# Patient Record
Sex: MALE | Race: OTHER | Employment: OTHER | URBAN - METROPOLITAN AREA
[De-identification: names, ages, dates, MRNs, and addresses within clinical notes are randomized per-mention and may not be internally consistent; named-entity substitution may affect disease eponyms.]

---

## 2024-08-19 DIAGNOSIS — G89.29 OTHER CHRONIC PAIN: ICD-10-CM

## 2024-08-19 DIAGNOSIS — K21.9 GASTRO-ESOPHAGEAL REFLUX DISEASE WITHOUT ESOPHAGITIS: ICD-10-CM

## 2024-08-19 DIAGNOSIS — I10 PRIMARY HYPERTENSION: Primary | ICD-10-CM

## 2024-08-20 RX ORDER — TELMISARTAN AND HYDROCHLORTHIAZIDE 80; 12.5 MG/1; MG/1
1 TABLET ORAL DAILY
Qty: 90 TABLET | Refills: 0 | Status: SHIPPED | OUTPATIENT
Start: 2024-08-20

## 2024-08-20 RX ORDER — PANTOPRAZOLE SODIUM 20 MG/1
20 TABLET, DELAYED RELEASE ORAL DAILY
Qty: 90 TABLET | Refills: 0 | Status: SHIPPED | OUTPATIENT
Start: 2024-08-20

## 2024-08-20 RX ORDER — KETOROLAC TROMETHAMINE 10 MG/1
10 TABLET, FILM COATED ORAL EVERY 6 HOURS PRN
Qty: 60 TABLET | Refills: 0 | Status: SHIPPED | OUTPATIENT
Start: 2024-08-20

## 2024-09-03 RX ORDER — DICLOFENAC SODIUM 75 MG/1
75 TABLET, DELAYED RELEASE ORAL 2 TIMES DAILY
Qty: 180 TABLET | Refills: 0 | Status: SHIPPED | OUTPATIENT
Start: 2024-09-03

## 2024-09-16 ENCOUNTER — OFFICE VISIT (OUTPATIENT)
Age: 64
End: 2024-09-16
Payer: MEDICARE

## 2024-09-16 VITALS
OXYGEN SATURATION: 96 % | BODY MASS INDEX: 26.69 KG/M2 | HEIGHT: 69 IN | TEMPERATURE: 98.4 F | SYSTOLIC BLOOD PRESSURE: 147 MMHG | RESPIRATION RATE: 16 BRPM | WEIGHT: 180.2 LBS | DIASTOLIC BLOOD PRESSURE: 79 MMHG | HEART RATE: 93 BPM

## 2024-09-16 DIAGNOSIS — R51.9 ACUTE NONINTRACTABLE HEADACHE, UNSPECIFIED HEADACHE TYPE: ICD-10-CM

## 2024-09-16 DIAGNOSIS — R53.83 OTHER FATIGUE: ICD-10-CM

## 2024-09-16 DIAGNOSIS — B95.0 STREPTOCOCCAL INFECTION GROUP A: Primary | ICD-10-CM

## 2024-09-16 DIAGNOSIS — R59.0 CERVICAL LYMPHADENOPATHY: ICD-10-CM

## 2024-09-16 DIAGNOSIS — L30.9 ECZEMA, UNSPECIFIED TYPE: ICD-10-CM

## 2024-09-16 LAB
GROUP A STREP ANTIGEN, POC: POSITIVE
INFLUENZA A ANTIGEN, POC: NEGATIVE
INFLUENZA B ANTIGEN, POC: NEGATIVE
LOT EXPIRE DATE: NORMAL
LOT KIT NUMBER: NORMAL
SARS-COV-2 RNA, POC: NEGATIVE
VALID INTERNAL CONTROL, POC: YES
VALID INTERNAL CONTROL: YES
VENDOR AND KIT NAME POC: NORMAL

## 2024-09-16 PROCEDURE — G8427 DOCREV CUR MEDS BY ELIG CLIN: HCPCS | Performed by: NURSE PRACTITIONER

## 2024-09-16 PROCEDURE — PBSHW AMB POC RAPID STREP A: Performed by: NURSE PRACTITIONER

## 2024-09-16 PROCEDURE — 1036F TOBACCO NON-USER: CPT | Performed by: NURSE PRACTITIONER

## 2024-09-16 PROCEDURE — 99214 OFFICE O/P EST MOD 30 MIN: CPT | Performed by: NURSE PRACTITIONER

## 2024-09-16 PROCEDURE — 3017F COLORECTAL CA SCREEN DOC REV: CPT | Performed by: NURSE PRACTITIONER

## 2024-09-16 PROCEDURE — 87880 STREP A ASSAY W/OPTIC: CPT | Performed by: NURSE PRACTITIONER

## 2024-09-16 PROCEDURE — G8419 CALC BMI OUT NRM PARAM NOF/U: HCPCS | Performed by: NURSE PRACTITIONER

## 2024-09-16 PROCEDURE — PBSHW AMB POC SARS-COV-2 AND INFLUENZA A/B: Performed by: NURSE PRACTITIONER

## 2024-09-16 PROCEDURE — 87428 SARSCOV & INF VIR A&B AG IA: CPT | Performed by: NURSE PRACTITIONER

## 2024-09-16 RX ORDER — BETAMETHASONE DIPROPIONATE 0.05 %
OINTMENT (GRAM) TOPICAL
Qty: 45 G | Refills: 1 | Status: SHIPPED | OUTPATIENT
Start: 2024-09-16

## 2024-09-16 ASSESSMENT — ENCOUNTER SYMPTOMS
NAUSEA: 0
STRIDOR: 0
CONSTIPATION: 0
RHINORRHEA: 0
WHEEZING: 0
CHEST TIGHTNESS: 0
COUGH: 0
EYES NEGATIVE: 1
DIARRHEA: 0
SINUS PRESSURE: 0
ABDOMINAL PAIN: 0
SINUS PAIN: 0
GASTROINTESTINAL NEGATIVE: 1
EYE REDNESS: 0
VOMITING: 0
EYE PAIN: 0
SHORTNESS OF BREATH: 0
BLOOD IN STOOL: 0
BACK PAIN: 0

## 2024-09-16 ASSESSMENT — PATIENT HEALTH QUESTIONNAIRE - PHQ9
SUM OF ALL RESPONSES TO PHQ QUESTIONS 1-9: 0
2. FEELING DOWN, DEPRESSED OR HOPELESS: NOT AT ALL
1. LITTLE INTEREST OR PLEASURE IN DOING THINGS: NOT AT ALL
SUM OF ALL RESPONSES TO PHQ QUESTIONS 1-9: 0
SUM OF ALL RESPONSES TO PHQ9 QUESTIONS 1 & 2: 0
SUM OF ALL RESPONSES TO PHQ QUESTIONS 1-9: 0
SUM OF ALL RESPONSES TO PHQ QUESTIONS 1-9: 0

## 2024-09-17 LAB
ALBUMIN SERPL-MCNC: 3.6 G/DL (ref 3.5–5)
ALBUMIN/GLOB SERPL: 0.7 (ref 1.1–2.2)
ALP SERPL-CCNC: 145 U/L (ref 45–117)
ALT SERPL-CCNC: 22 U/L (ref 12–78)
ANION GAP SERPL CALC-SCNC: 5 MMOL/L (ref 2–12)
AST SERPL-CCNC: 15 U/L (ref 15–37)
BASOPHILS # BLD: 0 K/UL (ref 0–0.1)
BASOPHILS NFR BLD: 0 % (ref 0–1)
BILIRUB SERPL-MCNC: 1 MG/DL (ref 0.2–1)
BUN SERPL-MCNC: 17 MG/DL (ref 6–20)
BUN/CREAT SERPL: 15 (ref 12–20)
CALCIUM SERPL-MCNC: 8.6 MG/DL (ref 8.5–10.1)
CHLORIDE SERPL-SCNC: 101 MMOL/L (ref 97–108)
CO2 SERPL-SCNC: 29 MMOL/L (ref 21–32)
CREAT SERPL-MCNC: 1.14 MG/DL (ref 0.7–1.3)
DIFFERENTIAL METHOD BLD: ABNORMAL
EOSINOPHIL # BLD: 0.5 K/UL (ref 0–0.4)
EOSINOPHIL NFR BLD: 7 % (ref 0–7)
ERYTHROCYTE [DISTWIDTH] IN BLOOD BY AUTOMATED COUNT: 14.7 % (ref 11.5–14.5)
GLOBULIN SER CALC-MCNC: 5.2 G/DL (ref 2–4)
GLUCOSE SERPL-MCNC: 135 MG/DL (ref 65–100)
HCT VFR BLD AUTO: 41.7 % (ref 36.6–50.3)
HGB BLD-MCNC: 14.1 G/DL (ref 12.1–17)
IMM GRANULOCYTES # BLD AUTO: 0 K/UL
IMM GRANULOCYTES NFR BLD AUTO: 0 %
LYMPHOCYTES # BLD: 0.3 K/UL (ref 0.8–3.5)
LYMPHOCYTES NFR BLD: 4 % (ref 12–49)
MCH RBC QN AUTO: 30.3 PG (ref 26–34)
MCHC RBC AUTO-ENTMCNC: 33.8 G/DL (ref 30–36.5)
MCV RBC AUTO: 89.7 FL (ref 80–99)
MONOCYTES # BLD: 0.6 K/UL (ref 0–1)
MONOCYTES NFR BLD: 8 % (ref 5–13)
NEUTS BAND NFR BLD MANUAL: 2 % (ref 0–6)
NEUTS SEG # BLD: 6.2 K/UL (ref 1.8–8)
NEUTS SEG NFR BLD: 79 % (ref 32–75)
NRBC # BLD: 0 K/UL (ref 0–0.01)
NRBC BLD-RTO: 0 PER 100 WBC
PLATELET # BLD AUTO: 166 K/UL (ref 150–400)
PMV BLD AUTO: 11.1 FL (ref 8.9–12.9)
POTASSIUM SERPL-SCNC: 3.3 MMOL/L (ref 3.5–5.1)
PROT SERPL-MCNC: 8.8 G/DL (ref 6.4–8.2)
RBC # BLD AUTO: 4.65 M/UL (ref 4.1–5.7)
RBC MORPH BLD: ABNORMAL
SODIUM SERPL-SCNC: 135 MMOL/L (ref 136–145)
TSH SERPL DL<=0.05 MIU/L-ACNC: 1.23 UIU/ML (ref 0.36–3.74)
WBC # BLD AUTO: 7.6 K/UL (ref 4.1–11.1)

## 2024-09-23 ENCOUNTER — OFFICE VISIT (OUTPATIENT)
Age: 64
End: 2024-09-23
Payer: MEDICARE

## 2024-09-23 VITALS
SYSTOLIC BLOOD PRESSURE: 154 MMHG | RESPIRATION RATE: 16 BRPM | WEIGHT: 186.8 LBS | OXYGEN SATURATION: 96 % | BODY MASS INDEX: 27.67 KG/M2 | HEART RATE: 72 BPM | TEMPERATURE: 97.5 F | HEIGHT: 69 IN | DIASTOLIC BLOOD PRESSURE: 74 MMHG

## 2024-09-23 DIAGNOSIS — T78.40XD ALLERGIC REACTION, SUBSEQUENT ENCOUNTER: Primary | ICD-10-CM

## 2024-09-23 DIAGNOSIS — I10 PRIMARY HYPERTENSION: ICD-10-CM

## 2024-09-23 DIAGNOSIS — B95.0 STREPTOCOCCAL INFECTION GROUP A: ICD-10-CM

## 2024-09-23 PROCEDURE — G8419 CALC BMI OUT NRM PARAM NOF/U: HCPCS | Performed by: NURSE PRACTITIONER

## 2024-09-23 PROCEDURE — 1036F TOBACCO NON-USER: CPT | Performed by: NURSE PRACTITIONER

## 2024-09-23 PROCEDURE — 99213 OFFICE O/P EST LOW 20 MIN: CPT | Performed by: NURSE PRACTITIONER

## 2024-09-23 PROCEDURE — G8427 DOCREV CUR MEDS BY ELIG CLIN: HCPCS | Performed by: NURSE PRACTITIONER

## 2024-09-23 PROCEDURE — 3077F SYST BP >= 140 MM HG: CPT | Performed by: NURSE PRACTITIONER

## 2024-09-23 PROCEDURE — 3017F COLORECTAL CA SCREEN DOC REV: CPT | Performed by: NURSE PRACTITIONER

## 2024-09-23 PROCEDURE — 3078F DIAST BP <80 MM HG: CPT | Performed by: NURSE PRACTITIONER

## 2024-09-23 RX ORDER — CLINDAMYCIN HCL 150 MG
450 CAPSULE ORAL 3 TIMES DAILY
COMMUNITY
Start: 2024-09-17

## 2024-09-23 RX ORDER — HYDROXYZINE HYDROCHLORIDE 50 MG/1
50 TABLET, FILM COATED ORAL NIGHTLY
Qty: 30 TABLET | Refills: 0 | Status: SHIPPED | OUTPATIENT
Start: 2024-09-23 | End: 2024-10-23

## 2024-09-23 RX ORDER — FAMOTIDINE 40 MG/1
40 TABLET, FILM COATED ORAL DAILY
COMMUNITY
Start: 2024-09-17 | End: 2024-09-23 | Stop reason: ALTCHOICE

## 2024-09-23 SDOH — ECONOMIC STABILITY: FOOD INSECURITY: WITHIN THE PAST 12 MONTHS, YOU WORRIED THAT YOUR FOOD WOULD RUN OUT BEFORE YOU GOT MONEY TO BUY MORE.: NEVER TRUE

## 2024-09-23 SDOH — ECONOMIC STABILITY: INCOME INSECURITY: HOW HARD IS IT FOR YOU TO PAY FOR THE VERY BASICS LIKE FOOD, HOUSING, MEDICAL CARE, AND HEATING?: NOT HARD AT ALL

## 2024-09-23 SDOH — ECONOMIC STABILITY: FOOD INSECURITY: WITHIN THE PAST 12 MONTHS, THE FOOD YOU BOUGHT JUST DIDN'T LAST AND YOU DIDN'T HAVE MONEY TO GET MORE.: NEVER TRUE

## 2024-09-23 ASSESSMENT — ENCOUNTER SYMPTOMS
VOMITING: 0
RHINORRHEA: 0
EYE REDNESS: 0
EYES NEGATIVE: 1
COUGH: 0
NAUSEA: 0
CONSTIPATION: 0
DIARRHEA: 0
EYE PAIN: 0
GASTROINTESTINAL NEGATIVE: 1
SINUS PAIN: 0
ABDOMINAL PAIN: 0
COLOR CHANGE: 1
BLOOD IN STOOL: 0
RESPIRATORY NEGATIVE: 1
CHEST TIGHTNESS: 0
SHORTNESS OF BREATH: 0
BACK PAIN: 0
SINUS PRESSURE: 0

## 2024-10-01 ENCOUNTER — TELEPHONE (OUTPATIENT)
Age: 64
End: 2024-10-01

## 2024-10-01 NOTE — TELEPHONE ENCOUNTER
Patient in [facility] currently and had stopped antibiotic a few days ago because of tingling and had started antibiotic last because he could not sleep. Patient woke this am with severe headache, swollen lymph nodes and dizziness.  Spouse calling to inform he was taken by ambulance and we will receive notes. Spouse ended call before paramedic  evaluation.

## 2024-10-03 ENCOUNTER — TELEPHONE (OUTPATIENT)
Age: 64
End: 2024-10-03

## 2024-10-03 NOTE — TELEPHONE ENCOUNTER
Patient's wife was sent through to the office via the nurse triage line.  She states that they are currently visiting family in [place] and patient went to the ER there due to swelling.  She states that the hospital advised that his kidney function is decreased and he should see a nephrologist.  Patient does not return to the [place] until 10/9 and has an appointment scheduled with Dr. Barajas on 10/10.  Provided contact number to [facility] for patient's wife to try to get an appointment scheduled with their office.

## 2024-10-03 NOTE — TELEPHONE ENCOUNTER
Patient wife called stating that the nephrology office needs the last 3 office notes faxed to them before scheduling an appointment. Patients wife is requesting that the hospital in [facility] fax all labs and visit summary.  Patient is willing to do a VV appointment if possible to discuss all of his concerns. Patient would like recommendations on what he should and should not be doing before his in person appointment on 10/10/2024.     [facility]   [phone]

## 2024-10-08 NOTE — PROGRESS NOTES
Zoltan Fontana Jr (:  1960) is a 64 y.o. male,Established patient, here for evaluation of the following chief complaint(s):  No chief complaint on file.     Diagnosis Orders   1. Primary hypertension        2. Rash        3. Renal insufficiency             Assessment & Plan  1. 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 Records are not great and I am putting this together based on story ..  The patient had a severe allergic reaction to amoxicillin, which led to systemic symptoms including swelling, redness, and potential kidney issues. This reaction may have caused first and second-degree burn-like symptoms and significant inflammation. The rash has mostly cleared up,but the swelling, shortness of breath and tingling remains.  Due to his shortness of breath and chest tightness, he was advised to seek immediate medical attention at the emergency room.    2. Chronic Kidney Disease.  His kidney function was normal as of 2024. The abnormality in his kidney function is likely due to his recent health issues, including a strep infection and an allergic reaction, both of which can cause glomerulonephritis?  or other kidney inflammation. These conditions can lead to systemic reactions, causing significant inflammation and potentially affecting the kidneys and blood pressure, leading to swelling. A referral to a nephrologist was made for further evaluation but given is acute swelling, chest pain and sob we sent him to ER    3. Shortness of Breath.  He reports feeling like his chest is in a vice, with significant tightness and shortness of breath, especially when lying down. He feels as a vice is wrapping around him . Given these symptoms, he was advised to go to the emergency room for immediate evaluation and treatment.    4. Tingling Sensation.  He continues to experience tingling in his tongue, fingertips, and feet, which started after taking antibiotics at the emergency room. This may be

## 2024-10-10 ENCOUNTER — APPOINTMENT (OUTPATIENT)
Facility: HOSPITAL | Age: 64
DRG: 698 | End: 2024-10-10
Payer: MEDICARE

## 2024-10-10 ENCOUNTER — OFFICE VISIT (OUTPATIENT)
Age: 64
End: 2024-10-10
Payer: MEDICARE

## 2024-10-10 ENCOUNTER — HOSPITAL ENCOUNTER (INPATIENT)
Facility: HOSPITAL | Age: 64
LOS: 10 days | Discharge: HOME | DRG: 698 | End: 2024-10-20
Attending: EMERGENCY MEDICINE | Admitting: HOSPITALIST
Payer: MEDICARE

## 2024-10-10 VITALS
HEART RATE: 89 BPM | RESPIRATION RATE: 16 BRPM | HEIGHT: 69 IN | BODY MASS INDEX: 29.51 KG/M2 | TEMPERATURE: 98.2 F | WEIGHT: 199.2 LBS | DIASTOLIC BLOOD PRESSURE: 85 MMHG | SYSTOLIC BLOOD PRESSURE: 110 MMHG | OXYGEN SATURATION: 97 %

## 2024-10-10 DIAGNOSIS — J18.9 COMMUNITY ACQUIRED PNEUMONIA, UNSPECIFIED LATERALITY: ICD-10-CM

## 2024-10-10 DIAGNOSIS — N17.9 ACUTE RENAL FAILURE, UNSPECIFIED ACUTE RENAL FAILURE TYPE (HCC): Primary | ICD-10-CM

## 2024-10-10 DIAGNOSIS — I10 PRIMARY HYPERTENSION: Primary | ICD-10-CM

## 2024-10-10 DIAGNOSIS — N28.9 RENAL INSUFFICIENCY: ICD-10-CM

## 2024-10-10 DIAGNOSIS — R21 RASH: ICD-10-CM

## 2024-10-10 LAB
ALBUMIN SERPL-MCNC: 0.8 G/DL (ref 3.5–5)
ALBUMIN/GLOB SERPL: 0.1 (ref 1.1–2.2)
ALP SERPL-CCNC: 184 U/L (ref 45–117)
ALT SERPL-CCNC: 26 U/L (ref 12–78)
AMORPH CRY URNS QL MICRO: ABNORMAL
ANION GAP SERPL CALC-SCNC: 5 MMOL/L (ref 2–12)
APPEARANCE UR: CLEAR
AST SERPL-CCNC: 37 U/L (ref 15–37)
BACTERIA URNS QL MICRO: ABNORMAL /HPF
BASOPHILS # BLD: 0.1 K/UL (ref 0–0.1)
BASOPHILS NFR BLD: 1 % (ref 0–1)
BILIRUB SERPL-MCNC: 0.4 MG/DL (ref 0.2–1)
BILIRUB UR QL: NEGATIVE
BUN SERPL-MCNC: 38 MG/DL (ref 6–20)
BUN/CREAT SERPL: 12 (ref 12–20)
CALCIUM SERPL-MCNC: 7.1 MG/DL (ref 8.5–10.1)
CHLORIDE SERPL-SCNC: 115 MMOL/L (ref 97–108)
CO2 SERPL-SCNC: 19 MMOL/L (ref 21–32)
COLOR UR: ABNORMAL
COMMENT:: NORMAL
COMMENT:: NORMAL
CREAT SERPL-MCNC: 3.05 MG/DL (ref 0.7–1.3)
DIFFERENTIAL METHOD BLD: ABNORMAL
EOSINOPHIL # BLD: 0.8 K/UL (ref 0–0.4)
EOSINOPHIL NFR BLD: 10 % (ref 0–7)
EPITH CASTS URNS QL MICRO: ABNORMAL /LPF
ERYTHROCYTE [DISTWIDTH] IN BLOOD BY AUTOMATED COUNT: 15.7 % (ref 11.5–14.5)
FLUAV RNA SPEC QL NAA+PROBE: NOT DETECTED
FLUBV RNA SPEC QL NAA+PROBE: NOT DETECTED
GLOBULIN SER CALC-MCNC: 6.7 G/DL (ref 2–4)
GLUCOSE SERPL-MCNC: 112 MG/DL (ref 65–100)
GLUCOSE UR STRIP.AUTO-MCNC: 500 MG/DL
HCT VFR BLD AUTO: 41.2 % (ref 36.6–50.3)
HGB BLD-MCNC: 13.8 G/DL (ref 12.1–17)
HGB UR QL STRIP: ABNORMAL
IMM GRANULOCYTES # BLD AUTO: 0 K/UL
IMM GRANULOCYTES NFR BLD AUTO: 0 %
KETONES UR QL STRIP.AUTO: ABNORMAL MG/DL
LEUKOCYTE ESTERASE UR QL STRIP.AUTO: NEGATIVE
LIPASE SERPL-CCNC: 26 U/L (ref 13–75)
LYMPHOCYTES # BLD: 0.6 K/UL (ref 0.8–3.5)
LYMPHOCYTES NFR BLD: 8 % (ref 12–49)
MCH RBC QN AUTO: 30.5 PG (ref 26–34)
MCHC RBC AUTO-ENTMCNC: 33.5 G/DL (ref 30–36.5)
MCV RBC AUTO: 90.9 FL (ref 80–99)
MONOCYTES # BLD: 0.3 K/UL (ref 0–1)
MONOCYTES NFR BLD: 4 % (ref 5–13)
MUCOUS THREADS URNS QL MICRO: ABNORMAL /LPF
NEUTS BAND NFR BLD MANUAL: 1 % (ref 0–6)
NEUTS SEG # BLD: 6.2 K/UL (ref 1.8–8)
NEUTS SEG NFR BLD: 76 % (ref 32–75)
NITRITE UR QL STRIP.AUTO: NEGATIVE
NRBC # BLD: 0 K/UL (ref 0–0.01)
NRBC BLD-RTO: 0 PER 100 WBC
PH UR STRIP: 7 (ref 5–8)
PLATELET # BLD AUTO: 143 K/UL (ref 150–400)
PMV BLD AUTO: 12 FL (ref 8.9–12.9)
POTASSIUM SERPL-SCNC: 4.7 MMOL/L (ref 3.5–5.1)
PROT SERPL-MCNC: 7.5 G/DL (ref 6.4–8.2)
PROT UR STRIP-MCNC: >300 MG/DL
RBC # BLD AUTO: 4.53 M/UL (ref 4.1–5.7)
RBC #/AREA URNS HPF: ABNORMAL /HPF (ref 0–5)
RBC MORPH BLD: ABNORMAL
SARS-COV-2 RNA RESP QL NAA+PROBE: NOT DETECTED
SODIUM SERPL-SCNC: 139 MMOL/L (ref 136–145)
SOURCE: NORMAL
SP GR UR REFRACTOMETRY: 1.02 (ref 1–1.03)
SPECIMEN HOLD: NORMAL
SPECIMEN HOLD: NORMAL
TROPONIN I SERPL HS-MCNC: 31 NG/L (ref 0–76)
UROBILINOGEN UR QL STRIP.AUTO: 0.2 EU/DL (ref 0.2–1)
WBC # BLD AUTO: 8 K/UL (ref 4.1–11.1)
WBC MORPH BLD: ABNORMAL
WBC URNS QL MICRO: ABNORMAL /HPF (ref 0–4)

## 2024-10-10 PROCEDURE — 1036F TOBACCO NON-USER: CPT | Performed by: INTERNAL MEDICINE

## 2024-10-10 PROCEDURE — 36415 COLL VENOUS BLD VENIPUNCTURE: CPT

## 2024-10-10 PROCEDURE — 2580000003 HC RX 258: Performed by: HOSPITALIST

## 2024-10-10 PROCEDURE — G8419 CALC BMI OUT NRM PARAM NOF/U: HCPCS | Performed by: INTERNAL MEDICINE

## 2024-10-10 PROCEDURE — 99215 OFFICE O/P EST HI 40 MIN: CPT | Performed by: INTERNAL MEDICINE

## 2024-10-10 PROCEDURE — 3074F SYST BP LT 130 MM HG: CPT | Performed by: INTERNAL MEDICINE

## 2024-10-10 PROCEDURE — 76770 US EXAM ABDO BACK WALL COMP: CPT

## 2024-10-10 PROCEDURE — 81001 URINALYSIS AUTO W/SCOPE: CPT

## 2024-10-10 PROCEDURE — 96375 TX/PRO/DX INJ NEW DRUG ADDON: CPT

## 2024-10-10 PROCEDURE — 6360000002 HC RX W HCPCS: Performed by: HOSPITALIST

## 2024-10-10 PROCEDURE — 6370000000 HC RX 637 (ALT 250 FOR IP): Performed by: STUDENT IN AN ORGANIZED HEALTH CARE EDUCATION/TRAINING PROGRAM

## 2024-10-10 PROCEDURE — 84484 ASSAY OF TROPONIN QUANT: CPT

## 2024-10-10 PROCEDURE — 71046 X-RAY EXAM CHEST 2 VIEWS: CPT

## 2024-10-10 PROCEDURE — 87636 SARSCOV2 & INF A&B AMP PRB: CPT

## 2024-10-10 PROCEDURE — 2580000003 HC RX 258: Performed by: EMERGENCY MEDICINE

## 2024-10-10 PROCEDURE — 80053 COMPREHEN METABOLIC PANEL: CPT

## 2024-10-10 PROCEDURE — G8484 FLU IMMUNIZE NO ADMIN: HCPCS | Performed by: INTERNAL MEDICINE

## 2024-10-10 PROCEDURE — 6360000002 HC RX W HCPCS: Performed by: EMERGENCY MEDICINE

## 2024-10-10 PROCEDURE — 85025 COMPLETE CBC W/AUTO DIFF WBC: CPT

## 2024-10-10 PROCEDURE — 83690 ASSAY OF LIPASE: CPT

## 2024-10-10 PROCEDURE — 3079F DIAST BP 80-89 MM HG: CPT | Performed by: INTERNAL MEDICINE

## 2024-10-10 PROCEDURE — 6370000000 HC RX 637 (ALT 250 FOR IP): Performed by: EMERGENCY MEDICINE

## 2024-10-10 PROCEDURE — 96365 THER/PROPH/DIAG IV INF INIT: CPT

## 2024-10-10 PROCEDURE — 2060000000 HC ICU INTERMEDIATE R&B

## 2024-10-10 PROCEDURE — G8428 CUR MEDS NOT DOCUMENT: HCPCS | Performed by: INTERNAL MEDICINE

## 2024-10-10 PROCEDURE — 3017F COLORECTAL CA SCREEN DOC REV: CPT | Performed by: INTERNAL MEDICINE

## 2024-10-10 PROCEDURE — 99285 EMERGENCY DEPT VISIT HI MDM: CPT

## 2024-10-10 PROCEDURE — 93005 ELECTROCARDIOGRAM TRACING: CPT | Performed by: EMERGENCY MEDICINE

## 2024-10-10 RX ORDER — 0.9 % SODIUM CHLORIDE 0.9 %
1000 INTRAVENOUS SOLUTION INTRAVENOUS ONCE
Status: COMPLETED | OUTPATIENT
Start: 2024-10-10 | End: 2024-10-10

## 2024-10-10 RX ORDER — ACETAMINOPHEN 500 MG
1000 TABLET ORAL
Status: COMPLETED | OUTPATIENT
Start: 2024-10-10 | End: 2024-10-10

## 2024-10-10 RX ORDER — TIZANIDINE 2 MG/1
4 TABLET ORAL EVERY 8 HOURS PRN
Status: DISCONTINUED | OUTPATIENT
Start: 2024-10-10 | End: 2024-10-20 | Stop reason: HOSPADM

## 2024-10-10 RX ORDER — DIAZEPAM 5 MG/1
5 TABLET ORAL ONCE
Status: COMPLETED | OUTPATIENT
Start: 2024-10-10 | End: 2024-10-10

## 2024-10-10 RX ORDER — IBUPROFEN 200 MG
400 TABLET ORAL
Status: DISCONTINUED | OUTPATIENT
Start: 2024-10-11 | End: 2024-10-10

## 2024-10-10 RX ADMIN — TIZANIDINE 4 MG: 2 TABLET ORAL at 21:35

## 2024-10-10 RX ADMIN — DIAZEPAM 5 MG: 5 TABLET ORAL at 15:54

## 2024-10-10 RX ADMIN — NICARDIPINE HYDROCHLORIDE 5 MG/HR: 2.5 INJECTION, SOLUTION INTRAVENOUS at 20:26

## 2024-10-10 RX ADMIN — AZITHROMYCIN MONOHYDRATE 500 MG: 500 INJECTION, POWDER, LYOPHILIZED, FOR SOLUTION INTRAVENOUS at 17:04

## 2024-10-10 RX ADMIN — SODIUM CHLORIDE 1000 ML: 9 INJECTION, SOLUTION INTRAVENOUS at 16:32

## 2024-10-10 RX ADMIN — ACETAMINOPHEN 1000 MG: 500 TABLET ORAL at 15:54

## 2024-10-10 RX ADMIN — SODIUM CHLORIDE 1000 ML: 9 INJECTION, SOLUTION INTRAVENOUS at 17:10

## 2024-10-10 RX ADMIN — WATER 2000 MG: 1 INJECTION INTRAMUSCULAR; INTRAVENOUS; SUBCUTANEOUS at 17:04

## 2024-10-10 ASSESSMENT — PAIN SCALES - GENERAL
PAINLEVEL_OUTOF10: 5
PAINLEVEL_OUTOF10: 8
PAINLEVEL_OUTOF10: 7
PAINLEVEL_OUTOF10: 7

## 2024-10-10 ASSESSMENT — PAIN DESCRIPTION - ORIENTATION: ORIENTATION: LOWER

## 2024-10-10 ASSESSMENT — PAIN DESCRIPTION - LOCATION
LOCATION: BACK
LOCATION: BACK

## 2024-10-10 ASSESSMENT — PAIN - FUNCTIONAL ASSESSMENT: PAIN_FUNCTIONAL_ASSESSMENT: 0-10

## 2024-10-10 ASSESSMENT — PAIN DESCRIPTION - FREQUENCY: FREQUENCY: INTERMITTENT

## 2024-10-10 NOTE — ED PROVIDER NOTES
pulse oximetry; ordering and review of studies; arranging urgent treatment with development of a management plan; evaluation of patient's response to treatment; frequent reassessment; and, discussions with other providers.  This critical care time was performed to assess and manage the high probability of imminent, life-threatening deterioration that could result in multi-organ failure. It was exclusive of separately billable procedures and treating other patients and teaching time.      FINAL IMPRESSION      1. Acute renal failure, unspecified acute renal failure type (HCC)    2. Community acquired pneumonia, unspecified laterality          DISPOSITION/PLAN   DISPOSITION Admitted 10/10/2024 08:30:24 PM      PATIENT REFERRED TO:  No follow-up provider specified.    DISCHARGE MEDICATIONS:  Current Discharge Medication List            Altaf Carter MD (electronically signed)  Emergency Attending Physician       Perfect Serve Consult for Admission  12:34 PM    ED Room Number: B336/01  Patient Name and age:  Zoltan Fontana Jr 64 y.o.  male  Working Diagnosis:   1. Acute renal failure, unspecified acute renal failure type (HCC)    2. Community acquired pneumonia, unspecified laterality        Department: [facility] - [phone]  Recommended Level of Care: telemetry  Readmission: No  Code Status:  Full Code  Sepsis present:  No  Reassessment needed: Yes  Isolation Requirements: no  COVID-19 Suspicion: No    Other:       Altaf Carter MD  10/11/24 6750

## 2024-10-10 NOTE — ED TRIAGE NOTES
Pt arrives to the ER for complaints of upper abdominal pain and shortness of breath that started about two weeks ago. Pt also reports chest pain along with chest tightness. Reports that the pain is constant.     Pt reports he also had swelling to his arms and legs.     Pt reports that he just got back from [place]. Pt states that he was seen at a hospital there and was told that he had elevated kidney function.     Reports that he has foaming urine and itching all over body.     Reports that kidney disease runs in his family

## 2024-10-11 LAB
COMMENT:: NORMAL
CREAT UR-MCNC: 152 MG/DL
CREAT UR-MCNC: 156 MG/DL
EKG ATRIAL RATE: 86 BPM
EKG DIAGNOSIS: NORMAL
EKG P AXIS: 57 DEGREES
EKG P-R INTERVAL: 126 MS
EKG Q-T INTERVAL: 388 MS
EKG QRS DURATION: 84 MS
EKG QTC CALCULATION (BAZETT): 464 MS
EKG R AXIS: 47 DEGREES
EKG T AXIS: -9 DEGREES
EKG VENTRICULAR RATE: 86 BPM
HBV SURFACE AB SER QL: REACTIVE
HBV SURFACE AB SER-ACNC: >1000 MIU/ML
HBV SURFACE AG SER QL: <0.1 INDEX
HBV SURFACE AG SER QL: NEGATIVE
HCV AB SER IA-ACNC: 0.22 INDEX
HCV AB SERPL QL IA: NONREACTIVE
MICROALBUMIN UR-MCNC: 853 MG/DL
MICROALBUMIN/CREAT UR-RTO: 5468 MG/G (ref 0–30)
PROT UR-MCNC: 2074 MG/DL (ref 0–11.9)
PROT/CREAT UR-RTO: 13.6
SPECIMEN HOLD: NORMAL

## 2024-10-11 PROCEDURE — 2580000003 HC RX 258: Performed by: HOSPITALIST

## 2024-10-11 PROCEDURE — 94761 N-INVAS EAR/PLS OXIMETRY MLT: CPT

## 2024-10-11 PROCEDURE — 2060000000 HC ICU INTERMEDIATE R&B

## 2024-10-11 PROCEDURE — 86038 ANTINUCLEAR ANTIBODIES: CPT

## 2024-10-11 PROCEDURE — 6360000002 HC RX W HCPCS: Performed by: HOSPITALIST

## 2024-10-11 PROCEDURE — 82043 UR ALBUMIN QUANTITATIVE: CPT

## 2024-10-11 PROCEDURE — 86160 COMPLEMENT ANTIGEN: CPT

## 2024-10-11 PROCEDURE — 83521 IG LIGHT CHAINS FREE EACH: CPT

## 2024-10-11 PROCEDURE — 82570 ASSAY OF URINE CREATININE: CPT

## 2024-10-11 PROCEDURE — 86037 ANCA TITER EACH ANTIBODY: CPT

## 2024-10-11 PROCEDURE — 86060 ANTISTREPTOLYSIN O TITER: CPT

## 2024-10-11 PROCEDURE — 6360000002 HC RX W HCPCS: Performed by: INTERNAL MEDICINE

## 2024-10-11 PROCEDURE — 6360000002 HC RX W HCPCS: Performed by: STUDENT IN AN ORGANIZED HEALTH CARE EDUCATION/TRAINING PROGRAM

## 2024-10-11 PROCEDURE — 84156 ASSAY OF PROTEIN URINE: CPT

## 2024-10-11 PROCEDURE — 2580000003 HC RX 258: Performed by: STUDENT IN AN ORGANIZED HEALTH CARE EDUCATION/TRAINING PROGRAM

## 2024-10-11 PROCEDURE — 83516 IMMUNOASSAY NONANTIBODY: CPT

## 2024-10-11 PROCEDURE — 84165 PROTEIN E-PHORESIS SERUM: CPT

## 2024-10-11 PROCEDURE — 6370000000 HC RX 637 (ALT 250 FOR IP): Performed by: STUDENT IN AN ORGANIZED HEALTH CARE EDUCATION/TRAINING PROGRAM

## 2024-10-11 PROCEDURE — 82784 ASSAY IGA/IGD/IGG/IGM EACH: CPT

## 2024-10-11 PROCEDURE — 87340 HEPATITIS B SURFACE AG IA: CPT

## 2024-10-11 PROCEDURE — 86334 IMMUNOFIX E-PHORESIS SERUM: CPT

## 2024-10-11 PROCEDURE — 86706 HEP B SURFACE ANTIBODY: CPT

## 2024-10-11 PROCEDURE — 51798 US URINE CAPACITY MEASURE: CPT

## 2024-10-11 PROCEDURE — 6360000002 HC RX W HCPCS

## 2024-10-11 PROCEDURE — 93010 ELECTROCARDIOGRAM REPORT: CPT | Performed by: STUDENT IN AN ORGANIZED HEALTH CARE EDUCATION/TRAINING PROGRAM

## 2024-10-11 PROCEDURE — 86803 HEPATITIS C AB TEST: CPT

## 2024-10-11 PROCEDURE — 84155 ASSAY OF PROTEIN SERUM: CPT

## 2024-10-11 RX ORDER — PROCHLORPERAZINE EDISYLATE 5 MG/ML
5 INJECTION INTRAMUSCULAR; INTRAVENOUS EVERY 6 HOURS PRN
Status: DISCONTINUED | OUTPATIENT
Start: 2024-10-11 | End: 2024-10-20 | Stop reason: HOSPADM

## 2024-10-11 RX ORDER — BUMETANIDE 0.25 MG/ML
2 INJECTION, SOLUTION INTRAMUSCULAR; INTRAVENOUS 2 TIMES DAILY
Status: DISCONTINUED | OUTPATIENT
Start: 2024-10-11 | End: 2024-10-20 | Stop reason: HOSPADM

## 2024-10-11 RX ORDER — NIFEDIPINE 30 MG/1
30 TABLET, EXTENDED RELEASE ORAL DAILY
Status: DISCONTINUED | OUTPATIENT
Start: 2024-10-11 | End: 2024-10-12

## 2024-10-11 RX ORDER — KETOROLAC TROMETHAMINE 30 MG/ML
15 INJECTION, SOLUTION INTRAMUSCULAR; INTRAVENOUS ONCE
Status: COMPLETED | OUTPATIENT
Start: 2024-10-11 | End: 2024-10-11

## 2024-10-11 RX ORDER — LOSARTAN POTASSIUM 50 MG/1
100 TABLET ORAL DAILY
Status: DISCONTINUED | OUTPATIENT
Start: 2024-10-11 | End: 2024-10-20 | Stop reason: HOSPADM

## 2024-10-11 RX ORDER — HEPARIN SODIUM 5000 [USP'U]/ML
5000 INJECTION, SOLUTION INTRAVENOUS; SUBCUTANEOUS EVERY 8 HOURS SCHEDULED
Status: DISCONTINUED | OUTPATIENT
Start: 2024-10-11 | End: 2024-10-18

## 2024-10-11 RX ORDER — MORPHINE SULFATE 2 MG/ML
2 INJECTION, SOLUTION INTRAMUSCULAR; INTRAVENOUS EVERY 4 HOURS PRN
Status: DISPENSED | OUTPATIENT
Start: 2024-10-11 | End: 2024-10-13

## 2024-10-11 RX ADMIN — TIZANIDINE 4 MG: 2 TABLET ORAL at 08:56

## 2024-10-11 RX ADMIN — BUMETANIDE 2 MG: 0.25 INJECTION INTRAMUSCULAR; INTRAVENOUS at 13:07

## 2024-10-11 RX ADMIN — NIFEDIPINE 30 MG: 30 TABLET, FILM COATED, EXTENDED RELEASE ORAL at 14:38

## 2024-10-11 RX ADMIN — NICARDIPINE HYDROCHLORIDE 5 MG/HR: 2.5 INJECTION, SOLUTION INTRAVENOUS at 07:49

## 2024-10-11 RX ADMIN — NICARDIPINE HYDROCHLORIDE 5 MG/HR: 2.5 INJECTION, SOLUTION INTRAVENOUS at 13:28

## 2024-10-11 RX ADMIN — LOSARTAN POTASSIUM 100 MG: 50 TABLET, FILM COATED ORAL at 14:24

## 2024-10-11 RX ADMIN — HEPARIN SODIUM 5000 UNITS: 5000 INJECTION INTRAVENOUS; SUBCUTANEOUS at 21:17

## 2024-10-11 RX ADMIN — PANTOPRAZOLE SODIUM 40 MG: 40 INJECTION, POWDER, FOR SOLUTION INTRAVENOUS at 13:10

## 2024-10-11 RX ADMIN — CEFTRIAXONE SODIUM 2000 MG: 2 INJECTION, POWDER, FOR SOLUTION INTRAMUSCULAR; INTRAVENOUS at 18:26

## 2024-10-11 RX ADMIN — BUMETANIDE 2 MG: 0.25 INJECTION INTRAMUSCULAR; INTRAVENOUS at 18:25

## 2024-10-11 RX ADMIN — MORPHINE SULFATE 2 MG: 2 INJECTION, SOLUTION INTRAMUSCULAR; INTRAVENOUS at 14:22

## 2024-10-11 RX ADMIN — KETOROLAC TROMETHAMINE 15 MG: 30 INJECTION, SOLUTION INTRAMUSCULAR at 01:52

## 2024-10-11 RX ADMIN — AZITHROMYCIN MONOHYDRATE 500 MG: 500 INJECTION, POWDER, LYOPHILIZED, FOR SOLUTION INTRAVENOUS at 18:49

## 2024-10-11 RX ADMIN — NICARDIPINE HYDROCHLORIDE 5 MG/HR: 2.5 INJECTION, SOLUTION INTRAVENOUS at 01:05

## 2024-10-11 RX ADMIN — PROCHLORPERAZINE EDISYLATE 5 MG: 5 INJECTION INTRAMUSCULAR; INTRAVENOUS at 19:34

## 2024-10-11 RX ADMIN — HEPARIN SODIUM 5000 UNITS: 5000 INJECTION INTRAVENOUS; SUBCUTANEOUS at 14:39

## 2024-10-11 RX ADMIN — MORPHINE SULFATE 2 MG: 2 INJECTION, SOLUTION INTRAMUSCULAR; INTRAVENOUS at 20:21

## 2024-10-11 RX ADMIN — NICARDIPINE HYDROCHLORIDE 5 MG/HR: 2.5 INJECTION, SOLUTION INTRAVENOUS at 13:04

## 2024-10-11 ASSESSMENT — PAIN DESCRIPTION - ORIENTATION
ORIENTATION: LEFT;RIGHT
ORIENTATION: RIGHT;LEFT
ORIENTATION: RIGHT;LEFT

## 2024-10-11 ASSESSMENT — PAIN SCALES - GENERAL
PAINLEVEL_OUTOF10: 8
PAINLEVEL_OUTOF10: 2
PAINLEVEL_OUTOF10: 5
PAINLEVEL_OUTOF10: 4
PAINLEVEL_OUTOF10: 0
PAINLEVEL_OUTOF10: 6
PAINLEVEL_OUTOF10: 0
PAINLEVEL_OUTOF10: 9

## 2024-10-11 ASSESSMENT — PAIN DESCRIPTION - DESCRIPTORS
DESCRIPTORS: SHARP
DESCRIPTORS: SPASM

## 2024-10-11 ASSESSMENT — PAIN DESCRIPTION - LOCATION
LOCATION: BACK;NECK
LOCATION: FLANK
LOCATION: ABDOMEN;FLANK
LOCATION: FLANK
LOCATION: BACK
LOCATION: HEAD

## 2024-10-11 ASSESSMENT — PAIN - FUNCTIONAL ASSESSMENT: PAIN_FUNCTIONAL_ASSESSMENT: PREVENTS OR INTERFERES SOME ACTIVE ACTIVITIES AND ADLS

## 2024-10-11 ASSESSMENT — PAIN DESCRIPTION - PAIN TYPE: TYPE: ACUTE PAIN

## 2024-10-11 ASSESSMENT — PAIN SCALES - WONG BAKER: WONGBAKER_NUMERICALRESPONSE: NO HURT

## 2024-10-11 NOTE — CONSULTS
medical history on file.     Past Surgical Hx:     Past Surgical History:   Procedure Laterality Date    ANTERIOR CRUCIATE LIGAMENT REPAIR      BACK SURGERY  2010    rods and screws in the back, fusion. 4 surgeries total       Medications:  Prior to Admission medications    Medication Sig Start Date End Date Taking? Authorizing Provider   diclofenac (VOLTAREN) 75 MG EC tablet Take 1 tablet by mouth 2 times daily 9/3/24  Yes Toya Carlisle MD   pantoprazole (PROTONIX) 20 MG tablet Take 1 tablet by mouth daily 8/20/24  Yes Toya Carlisle MD   telmisartan-hydroCHLOROthiazide (MICARDIS HCT) 80-12.5 MG per tablet Take 1 tablet by mouth daily 8/20/24  Yes Toya Carlisle MD   ketorolac (TORADOL) 10 MG tablet Take 1 tablet by mouth every 6 hours as needed for Pain 8/20/24  Yes Toya Carlisle MD   tiZANidine (ZANAFLEX) 4 MG tablet Take 1 tablet by mouth every 8 hours as needed (muscle spasms) 9/10/22  Yes ProviderBrooke MD   clindamycin (CLEOCIN) 150 MG capsule Take 3 capsules by mouth 3 times daily  Patient not taking: Reported on 10/10/2024 9/17/24   Brooke Baumann MD   hydrOXYzine HCl (ATARAX) 50 MG tablet Take 1 tablet by mouth nightly  Patient not taking: Reported on 10/10/2024 9/23/24 10/23/24  Emilia Vera APRN - CNP   betamethasone dipropionate 0.05 % ointment Apply topically daily.  Patient not taking: Reported on 10/10/2024 9/16/24   Emilia Vera APRN - CNP       Allergies   Allergen Reactions    Augmentin [Amoxicillin-Pot Clavulanate] Anaphylaxis    Bee Venom Palpitations and Shortness Of Breath    Rosuvastatin Other (See Comments)     Insomnia        Social Hx:  reports that he has never smoked. He has never used smokeless tobacco. He reports current alcohol use. He reports that he does not use drugs.     Family History   Problem Relation Age of Onset    Hypertension Paternal Grandfather     Heart Disease Paternal Grandfather     Heart Disease Paternal

## 2024-10-11 NOTE — ED NOTES
TRANSFER - OUT REPORT:    Verbal report given to Sivan on Zoltan Fontana Jr  being transferred to [facility] for routine progression of patient care       Report consisted of patient's Situation, Background, Assessment and   Recommendations(SBAR).     Information from the following report(s) Nurse Handoff Report, Index, ED Encounter Summary, ED SBAR, Intake/Output, MAR, Recent Results, Quality Measures, Neuro Assessment, and Event Log was reviewed with the receiving nurse.    [place] Fall Assessment:    Presents to emergency department  because of falls (Syncope, seizure, or loss of consciousness): No  Age > 70: No  Altered Mental Status, Intoxication with alcohol or substance confusion (Disorientation, impaired judgment, poor safety awaremess, or inability to follow instructions): No  Impaired Mobility: Ambulates or transfers with assistive devices or assistance; Unable to ambulate or transer.: No  Nursing Judgement: No          Lines:   Peripheral IV 10/10/24 Distal;Left;Anterior Forearm (Active)       Peripheral IV 10/10/24 Right Antecubital (Active)        Opportunity for questions and clarification was provided.      Patient transported with:  Registered Nurse

## 2024-10-11 NOTE — CARE COORDINATION
4:20 PM  10/11/24    Care Management Progress Note    Reason for Admission:   ARF (acute renal failure) (HCC) [N17.9]  Acute renal failure, unspecified acute renal failure type (HCC) [N17.9]  Community acquired pneumonia, unspecified laterality [J18.9]         Patient Admission Status: Inpatient  Date Admitted to INP:  11%  RUR: Readmission Risk Score: 10.6    Hospitalization in the last 30 days (Readmission):    No      Transition of care plan:  Ongoing medical management- pt discussed during IDR; renal biopsy planned for Monday  Anticipated discharge plan: Home with family assistance as needed; CM will continue to follow pt's clinical course and assist with discharge needs.  Outpatient follow-up.  Discharge transport: ADRIAN Colón

## 2024-10-12 ENCOUNTER — APPOINTMENT (OUTPATIENT)
Facility: HOSPITAL | Age: 64
DRG: 698 | End: 2024-10-12
Payer: MEDICARE

## 2024-10-12 LAB
ALBUMIN SERPL-MCNC: 0.8 G/DL (ref 3.5–5)
ALBUMIN/GLOB SERPL: 0.1 (ref 1.1–2.2)
ALP SERPL-CCNC: 151 U/L (ref 45–117)
ALT SERPL-CCNC: 17 U/L (ref 12–78)
ANION GAP SERPL CALC-SCNC: 5 MMOL/L (ref 2–12)
AST SERPL-CCNC: 14 U/L (ref 15–37)
BASOPHILS # BLD: 0.3 K/UL (ref 0–0.1)
BASOPHILS NFR BLD: 4 % (ref 0–1)
BILIRUB SERPL-MCNC: 0.3 MG/DL (ref 0.2–1)
BUN SERPL-MCNC: 34 MG/DL (ref 6–20)
BUN/CREAT SERPL: 13 (ref 12–20)
CALCIUM SERPL-MCNC: 7.1 MG/DL (ref 8.5–10.1)
CHLORIDE SERPL-SCNC: 117 MMOL/L (ref 97–108)
CHOLEST SERPL-MCNC: 207 MG/DL
CO2 SERPL-SCNC: 18 MMOL/L (ref 21–32)
CREAT SERPL-MCNC: 2.72 MG/DL (ref 0.7–1.3)
DIFFERENTIAL METHOD BLD: ABNORMAL
EOSINOPHIL # BLD: 1.2 K/UL (ref 0–0.4)
EOSINOPHIL NFR BLD: 16 % (ref 0–7)
ERYTHROCYTE [DISTWIDTH] IN BLOOD BY AUTOMATED COUNT: 15.9 % (ref 11.5–14.5)
EST. AVERAGE GLUCOSE BLD GHB EST-MCNC: 100 MG/DL
GLOBULIN SER CALC-MCNC: 6.4 G/DL (ref 2–4)
GLUCOSE SERPL-MCNC: 90 MG/DL (ref 65–100)
HBA1C MFR BLD: 5.1 % (ref 4–5.6)
HCT VFR BLD AUTO: 37.5 % (ref 36.6–50.3)
HDLC SERPL-MCNC: 22 MG/DL
HDLC SERPL: 9.4 (ref 0–5)
HGB BLD-MCNC: 12.5 G/DL (ref 12.1–17)
IMM GRANULOCYTES # BLD AUTO: 0 K/UL
IMM GRANULOCYTES NFR BLD AUTO: 0 %
LDLC SERPL CALC-MCNC: 125 MG/DL (ref 0–100)
LYMPHOCYTES # BLD: 1.2 K/UL (ref 0.8–3.5)
LYMPHOCYTES NFR BLD: 16 % (ref 12–49)
MCH RBC QN AUTO: 30.3 PG (ref 26–34)
MCHC RBC AUTO-ENTMCNC: 33.3 G/DL (ref 30–36.5)
MCV RBC AUTO: 91 FL (ref 80–99)
MONOCYTES # BLD: 0.2 K/UL (ref 0–1)
MONOCYTES NFR BLD: 2 % (ref 5–13)
NEUTS BAND NFR BLD MANUAL: 1 % (ref 0–6)
NEUTS SEG # BLD: 4.7 K/UL (ref 1.8–8)
NEUTS SEG NFR BLD: 61 % (ref 32–75)
NRBC # BLD: 0 K/UL (ref 0–0.01)
NRBC BLD-RTO: 0 PER 100 WBC
PLATELET # BLD AUTO: 151 K/UL (ref 150–400)
POTASSIUM SERPL-SCNC: 3.7 MMOL/L (ref 3.5–5.1)
PROCALCITONIN SERPL-MCNC: 0.47 NG/ML
PROT SERPL-MCNC: 7.2 G/DL (ref 6.4–8.2)
RBC # BLD AUTO: 4.12 M/UL (ref 4.1–5.7)
RBC MORPH BLD: ABNORMAL
SODIUM SERPL-SCNC: 140 MMOL/L (ref 136–145)
TRIGL SERPL-MCNC: 300 MG/DL
VLDLC SERPL CALC-MCNC: 60 MG/DL
WBC # BLD AUTO: 7.6 K/UL (ref 4.1–11.1)

## 2024-10-12 PROCEDURE — 36415 COLL VENOUS BLD VENIPUNCTURE: CPT

## 2024-10-12 PROCEDURE — P9047 ALBUMIN (HUMAN), 25%, 50ML: HCPCS | Performed by: STUDENT IN AN ORGANIZED HEALTH CARE EDUCATION/TRAINING PROGRAM

## 2024-10-12 PROCEDURE — 2700000000 HC OXYGEN THERAPY PER DAY

## 2024-10-12 PROCEDURE — 6360000002 HC RX W HCPCS: Performed by: STUDENT IN AN ORGANIZED HEALTH CARE EDUCATION/TRAINING PROGRAM

## 2024-10-12 PROCEDURE — 76705 ECHO EXAM OF ABDOMEN: CPT

## 2024-10-12 PROCEDURE — 2060000000 HC ICU INTERMEDIATE R&B

## 2024-10-12 PROCEDURE — 6370000000 HC RX 637 (ALT 250 FOR IP)

## 2024-10-12 PROCEDURE — 6360000002 HC RX W HCPCS: Performed by: HOSPITALIST

## 2024-10-12 PROCEDURE — 74176 CT ABD & PELVIS W/O CONTRAST: CPT

## 2024-10-12 PROCEDURE — 80053 COMPREHEN METABOLIC PANEL: CPT

## 2024-10-12 PROCEDURE — 2580000003 HC RX 258: Performed by: STUDENT IN AN ORGANIZED HEALTH CARE EDUCATION/TRAINING PROGRAM

## 2024-10-12 PROCEDURE — 6360000002 HC RX W HCPCS: Performed by: INTERNAL MEDICINE

## 2024-10-12 PROCEDURE — 80061 LIPID PANEL: CPT

## 2024-10-12 PROCEDURE — 6370000000 HC RX 637 (ALT 250 FOR IP): Performed by: STUDENT IN AN ORGANIZED HEALTH CARE EDUCATION/TRAINING PROGRAM

## 2024-10-12 PROCEDURE — 2580000003 HC RX 258: Performed by: HOSPITALIST

## 2024-10-12 PROCEDURE — 84145 PROCALCITONIN (PCT): CPT

## 2024-10-12 PROCEDURE — 83036 HEMOGLOBIN GLYCOSYLATED A1C: CPT

## 2024-10-12 PROCEDURE — 85025 COMPLETE CBC W/AUTO DIFF WBC: CPT

## 2024-10-12 RX ORDER — NIFEDIPINE 30 MG/1
60 TABLET, EXTENDED RELEASE ORAL DAILY
Status: DISCONTINUED | OUTPATIENT
Start: 2024-10-12 | End: 2024-10-14

## 2024-10-12 RX ORDER — HYDROXYZINE HYDROCHLORIDE 25 MG/1
25 TABLET, FILM COATED ORAL ONCE
Status: COMPLETED | OUTPATIENT
Start: 2024-10-12 | End: 2024-10-12

## 2024-10-12 RX ORDER — HYDRALAZINE HYDROCHLORIDE 20 MG/ML
10 INJECTION INTRAMUSCULAR; INTRAVENOUS EVERY 4 HOURS PRN
Status: DISCONTINUED | OUTPATIENT
Start: 2024-10-12 | End: 2024-10-20 | Stop reason: HOSPADM

## 2024-10-12 RX ORDER — ALBUMIN (HUMAN) 12.5 G/50ML
25 SOLUTION INTRAVENOUS ONCE
Status: COMPLETED | OUTPATIENT
Start: 2024-10-12 | End: 2024-10-12

## 2024-10-12 RX ORDER — NIFEDIPINE 30 MG/1
30 TABLET, EXTENDED RELEASE ORAL ONCE
Status: COMPLETED | OUTPATIENT
Start: 2024-10-12 | End: 2024-10-12

## 2024-10-12 RX ORDER — HYDROXYZINE HYDROCHLORIDE 10 MG/1
10 TABLET, FILM COATED ORAL ONCE
Status: COMPLETED | OUTPATIENT
Start: 2024-10-12 | End: 2024-10-12

## 2024-10-12 RX ORDER — LABETALOL HYDROCHLORIDE 5 MG/ML
10 INJECTION, SOLUTION INTRAVENOUS EVERY 6 HOURS PRN
Status: DISCONTINUED | OUTPATIENT
Start: 2024-10-12 | End: 2024-10-20 | Stop reason: HOSPADM

## 2024-10-12 RX ORDER — BUTALBITAL, ACETAMINOPHEN AND CAFFEINE 50; 325; 40 MG/1; MG/1; MG/1
1 TABLET ORAL ONCE
Status: COMPLETED | OUTPATIENT
Start: 2024-10-12 | End: 2024-10-12

## 2024-10-12 RX ADMIN — MORPHINE SULFATE 2 MG: 2 INJECTION, SOLUTION INTRAMUSCULAR; INTRAVENOUS at 11:14

## 2024-10-12 RX ADMIN — ALBUMIN (HUMAN) 25 G: 0.25 INJECTION, SOLUTION INTRAVENOUS at 12:44

## 2024-10-12 RX ADMIN — MORPHINE SULFATE 2 MG: 2 INJECTION, SOLUTION INTRAMUSCULAR; INTRAVENOUS at 20:17

## 2024-10-12 RX ADMIN — MORPHINE SULFATE 2 MG: 2 INJECTION, SOLUTION INTRAMUSCULAR; INTRAVENOUS at 16:26

## 2024-10-12 RX ADMIN — HYDROXYZINE HYDROCHLORIDE 10 MG: 10 TABLET ORAL at 20:53

## 2024-10-12 RX ADMIN — BUTALBITAL, ACETAMINOPHEN, AND CAFFEINE 1 TABLET: 325; 50; 40 TABLET ORAL at 20:53

## 2024-10-12 RX ADMIN — HEPARIN SODIUM 5000 UNITS: 5000 INJECTION INTRAVENOUS; SUBCUTANEOUS at 15:41

## 2024-10-12 RX ADMIN — BUMETANIDE 2 MG: 0.25 INJECTION INTRAMUSCULAR; INTRAVENOUS at 08:54

## 2024-10-12 RX ADMIN — NIFEDIPINE 30 MG: 30 TABLET, FILM COATED, EXTENDED RELEASE ORAL at 08:55

## 2024-10-12 RX ADMIN — HYDROXYZINE HYDROCHLORIDE 25 MG: 25 TABLET, FILM COATED ORAL at 06:06

## 2024-10-12 RX ADMIN — NICARDIPINE HYDROCHLORIDE 5 MG/HR: 2.5 INJECTION, SOLUTION INTRAVENOUS at 16:33

## 2024-10-12 RX ADMIN — NIFEDIPINE 30 MG: 30 TABLET, FILM COATED, EXTENDED RELEASE ORAL at 12:41

## 2024-10-12 RX ADMIN — BUMETANIDE 2 MG: 0.25 INJECTION INTRAMUSCULAR; INTRAVENOUS at 18:12

## 2024-10-12 RX ADMIN — HEPARIN SODIUM 5000 UNITS: 5000 INJECTION INTRAVENOUS; SUBCUTANEOUS at 20:54

## 2024-10-12 RX ADMIN — HYDRALAZINE HYDROCHLORIDE 10 MG: 20 INJECTION INTRAMUSCULAR; INTRAVENOUS at 15:49

## 2024-10-12 RX ADMIN — HEPARIN SODIUM 5000 UNITS: 5000 INJECTION INTRAVENOUS; SUBCUTANEOUS at 05:35

## 2024-10-12 RX ADMIN — LOSARTAN POTASSIUM 100 MG: 50 TABLET, FILM COATED ORAL at 08:54

## 2024-10-12 RX ADMIN — MORPHINE SULFATE 2 MG: 2 INJECTION, SOLUTION INTRAMUSCULAR; INTRAVENOUS at 01:38

## 2024-10-12 RX ADMIN — PANTOPRAZOLE SODIUM 40 MG: 40 INJECTION, POWDER, FOR SOLUTION INTRAVENOUS at 08:54

## 2024-10-12 ASSESSMENT — PAIN DESCRIPTION - ORIENTATION
ORIENTATION: RIGHT;LEFT;ANTERIOR;LOWER
ORIENTATION: LOWER

## 2024-10-12 ASSESSMENT — PAIN DESCRIPTION - LOCATION
LOCATION: BACK;ABDOMEN
LOCATION: ABDOMEN
LOCATION: HEAD
LOCATION: ABDOMEN;BACK

## 2024-10-12 ASSESSMENT — PAIN - FUNCTIONAL ASSESSMENT
PAIN_FUNCTIONAL_ASSESSMENT: ACTIVITIES ARE NOT PREVENTED
PAIN_FUNCTIONAL_ASSESSMENT: ACTIVITIES ARE NOT PREVENTED
PAIN_FUNCTIONAL_ASSESSMENT: PREVENTS OR INTERFERES SOME ACTIVE ACTIVITIES AND ADLS

## 2024-10-12 ASSESSMENT — PAIN SCALES - GENERAL
PAINLEVEL_OUTOF10: 0
PAINLEVEL_OUTOF10: 0
PAINLEVEL_OUTOF10: 6
PAINLEVEL_OUTOF10: 8
PAINLEVEL_OUTOF10: 7
PAINLEVEL_OUTOF10: 8
PAINLEVEL_OUTOF10: 0
PAINLEVEL_OUTOF10: 8
PAINLEVEL_OUTOF10: 8
PAINLEVEL_OUTOF10: 1
PAINLEVEL_OUTOF10: 1
PAINLEVEL_OUTOF10: 0

## 2024-10-12 ASSESSMENT — PAIN DESCRIPTION - DESCRIPTORS
DESCRIPTORS: SHARP
DESCRIPTORS: ACHING
DESCRIPTORS: ACHING;SHARP;STABBING
DESCRIPTORS: ACHING

## 2024-10-13 LAB
ALBUMIN SERPL-MCNC: 1.2 G/DL (ref 3.5–5)
ANION GAP SERPL CALC-SCNC: 4 MMOL/L (ref 2–12)
APPEARANCE UR: CLEAR
ASO AB SERPL-ACNC: 139 IU/ML (ref 0–200)
BACTERIA URNS QL MICRO: ABNORMAL /HPF
BASOPHILS # BLD: 0.1 K/UL (ref 0–0.1)
BASOPHILS NFR BLD: 1 % (ref 0–1)
BILIRUB UR QL: NEGATIVE
BUN SERPL-MCNC: 30 MG/DL (ref 6–20)
BUN/CREAT SERPL: 12 (ref 12–20)
C3 SERPL-MCNC: 127 MG/DL (ref 82–167)
C4 SERPL-MCNC: 12 MG/DL (ref 12–38)
CALCIUM SERPL-MCNC: 7.5 MG/DL (ref 8.5–10.1)
CHLORIDE SERPL-SCNC: 116 MMOL/L (ref 97–108)
CO2 SERPL-SCNC: 21 MMOL/L (ref 21–32)
COLOR UR: ABNORMAL
COMMENT:: NORMAL
COMMENT:: NORMAL
CREAT SERPL-MCNC: 2.43 MG/DL (ref 0.7–1.3)
DIFFERENTIAL METHOD BLD: ABNORMAL
EOSINOPHIL # BLD: 0.4 K/UL (ref 0–0.4)
EOSINOPHIL NFR BLD: 5 % (ref 0–7)
EPITH CASTS URNS QL MICRO: ABNORMAL /LPF
ERYTHROCYTE [DISTWIDTH] IN BLOOD BY AUTOMATED COUNT: 15.6 % (ref 11.5–14.5)
GLUCOSE SERPL-MCNC: 90 MG/DL (ref 65–100)
GLUCOSE UR STRIP.AUTO-MCNC: 500 MG/DL
HCT VFR BLD AUTO: 36.1 % (ref 36.6–50.3)
HGB BLD-MCNC: 12.2 G/DL (ref 12.1–17)
HGB UR QL STRIP: ABNORMAL
IMM GRANULOCYTES # BLD AUTO: 0 K/UL (ref 0–0.04)
IMM GRANULOCYTES NFR BLD AUTO: 0 % (ref 0–0.5)
KETONES UR QL STRIP.AUTO: NEGATIVE MG/DL
LEUKOCYTE ESTERASE UR QL STRIP.AUTO: NEGATIVE
LYMPHOCYTES # BLD: 1.3 K/UL (ref 0.8–3.5)
LYMPHOCYTES NFR BLD: 17 % (ref 12–49)
MCH RBC QN AUTO: 30.2 PG (ref 26–34)
MCHC RBC AUTO-ENTMCNC: 33.8 G/DL (ref 30–36.5)
MCV RBC AUTO: 89.4 FL (ref 80–99)
METAMYELOCYTES NFR BLD MANUAL: 4 %
MONOCYTES # BLD: 0.5 K/UL (ref 0–1)
MONOCYTES NFR BLD: 6 % (ref 5–13)
NEUTS BAND NFR BLD MANUAL: 2 %
NEUTS SEG # BLD: 5.2 K/UL (ref 1.8–8)
NEUTS SEG NFR BLD: 65 % (ref 32–75)
NITRITE UR QL STRIP.AUTO: NEGATIVE
NRBC # BLD: 0 K/UL (ref 0–0.01)
NRBC BLD-RTO: 0 PER 100 WBC
PH UR STRIP: 7.5 (ref 5–8)
PHOSPHATE SERPL-MCNC: 3.5 MG/DL (ref 2.6–4.7)
PLATELET # BLD AUTO: 180 K/UL (ref 150–400)
PMV BLD AUTO: 10.7 FL (ref 8.9–12.9)
POTASSIUM SERPL-SCNC: 3.6 MMOL/L (ref 3.5–5.1)
PROT UR STRIP-MCNC: >300 MG/DL
RBC # BLD AUTO: 4.04 M/UL (ref 4.1–5.7)
RBC #/AREA URNS HPF: ABNORMAL /HPF (ref 0–5)
RBC MORPH BLD: ABNORMAL
SODIUM SERPL-SCNC: 141 MMOL/L (ref 136–145)
SP GR UR REFRACTOMETRY: 1.01 (ref 1–1.03)
SPECIMEN HOLD: NORMAL
SPECIMEN HOLD: NORMAL
URINE CULTURE IF INDICATED: ABNORMAL
UROBILINOGEN UR QL STRIP.AUTO: 0.2 EU/DL (ref 0.2–1)
WBC # BLD AUTO: 7.7 K/UL (ref 4.1–11.1)
WBC URNS QL MICRO: ABNORMAL /HPF (ref 0–4)

## 2024-10-13 PROCEDURE — 6370000000 HC RX 637 (ALT 250 FOR IP): Performed by: STUDENT IN AN ORGANIZED HEALTH CARE EDUCATION/TRAINING PROGRAM

## 2024-10-13 PROCEDURE — 80069 RENAL FUNCTION PANEL: CPT

## 2024-10-13 PROCEDURE — 6360000002 HC RX W HCPCS: Performed by: STUDENT IN AN ORGANIZED HEALTH CARE EDUCATION/TRAINING PROGRAM

## 2024-10-13 PROCEDURE — 51798 US URINE CAPACITY MEASURE: CPT

## 2024-10-13 PROCEDURE — 94761 N-INVAS EAR/PLS OXIMETRY MLT: CPT

## 2024-10-13 PROCEDURE — 36415 COLL VENOUS BLD VENIPUNCTURE: CPT

## 2024-10-13 PROCEDURE — 94640 AIRWAY INHALATION TREATMENT: CPT

## 2024-10-13 PROCEDURE — 2580000003 HC RX 258: Performed by: STUDENT IN AN ORGANIZED HEALTH CARE EDUCATION/TRAINING PROGRAM

## 2024-10-13 PROCEDURE — 85025 COMPLETE CBC W/AUTO DIFF WBC: CPT

## 2024-10-13 PROCEDURE — 6360000002 HC RX W HCPCS: Performed by: INTERNAL MEDICINE

## 2024-10-13 PROCEDURE — 2060000000 HC ICU INTERMEDIATE R&B

## 2024-10-13 PROCEDURE — 81001 URINALYSIS AUTO W/SCOPE: CPT

## 2024-10-13 RX ORDER — POLYETHYLENE GLYCOL 3350 17 G/17G
17 POWDER, FOR SOLUTION ORAL DAILY
Status: DISCONTINUED | OUTPATIENT
Start: 2024-10-13 | End: 2024-10-20 | Stop reason: HOSPADM

## 2024-10-13 RX ORDER — HYDROMORPHONE HYDROCHLORIDE 1 MG/ML
0.25 INJECTION, SOLUTION INTRAMUSCULAR; INTRAVENOUS; SUBCUTANEOUS EVERY 4 HOURS PRN
Status: DISCONTINUED | OUTPATIENT
Start: 2024-10-13 | End: 2024-10-15

## 2024-10-13 RX ORDER — IPRATROPIUM BROMIDE AND ALBUTEROL SULFATE 2.5; .5 MG/3ML; MG/3ML
1 SOLUTION RESPIRATORY (INHALATION) EVERY 6 HOURS PRN
Status: DISCONTINUED | OUTPATIENT
Start: 2024-10-13 | End: 2024-10-20 | Stop reason: HOSPADM

## 2024-10-13 RX ORDER — SENNA AND DOCUSATE SODIUM 50; 8.6 MG/1; MG/1
2 TABLET, FILM COATED ORAL DAILY PRN
Status: DISCONTINUED | OUTPATIENT
Start: 2024-10-13 | End: 2024-10-15

## 2024-10-13 RX ADMIN — PANTOPRAZOLE SODIUM 40 MG: 40 INJECTION, POWDER, FOR SOLUTION INTRAVENOUS at 08:53

## 2024-10-13 RX ADMIN — POLYETHYLENE GLYCOL 3350 17 G: 17 POWDER, FOR SOLUTION ORAL at 15:06

## 2024-10-13 RX ADMIN — HEPARIN SODIUM 5000 UNITS: 5000 INJECTION INTRAVENOUS; SUBCUTANEOUS at 22:38

## 2024-10-13 RX ADMIN — BUMETANIDE 2 MG: 0.25 INJECTION INTRAMUSCULAR; INTRAVENOUS at 17:29

## 2024-10-13 RX ADMIN — HEPARIN SODIUM 5000 UNITS: 5000 INJECTION INTRAVENOUS; SUBCUTANEOUS at 06:36

## 2024-10-13 RX ADMIN — HYDROMORPHONE HYDROCHLORIDE 0.25 MG: 1 INJECTION, SOLUTION INTRAMUSCULAR; INTRAVENOUS; SUBCUTANEOUS at 22:38

## 2024-10-13 RX ADMIN — HEPARIN SODIUM 5000 UNITS: 5000 INJECTION INTRAVENOUS; SUBCUTANEOUS at 13:27

## 2024-10-13 RX ADMIN — BUMETANIDE 2 MG: 0.25 INJECTION INTRAMUSCULAR; INTRAVENOUS at 08:54

## 2024-10-13 RX ADMIN — TIZANIDINE 4 MG: 2 TABLET ORAL at 18:56

## 2024-10-13 RX ADMIN — NIFEDIPINE 60 MG: 30 TABLET, FILM COATED, EXTENDED RELEASE ORAL at 08:54

## 2024-10-13 RX ADMIN — IPRATROPIUM BROMIDE AND ALBUTEROL SULFATE 1 DOSE: 2.5; .5 SOLUTION RESPIRATORY (INHALATION) at 11:12

## 2024-10-13 RX ADMIN — MORPHINE SULFATE 2 MG: 2 INJECTION, SOLUTION INTRAMUSCULAR; INTRAVENOUS at 13:26

## 2024-10-13 RX ADMIN — LOSARTAN POTASSIUM 100 MG: 50 TABLET, FILM COATED ORAL at 08:54

## 2024-10-13 RX ADMIN — TIZANIDINE 4 MG: 2 TABLET ORAL at 12:17

## 2024-10-13 ASSESSMENT — PAIN DESCRIPTION - DESCRIPTORS
DESCRIPTORS: STABBING
DESCRIPTORS: ACHING

## 2024-10-13 ASSESSMENT — PAIN SCALES - GENERAL
PAINLEVEL_OUTOF10: 10
PAINLEVEL_OUTOF10: 8
PAINLEVEL_OUTOF10: 8
PAINLEVEL_OUTOF10: 0
PAINLEVEL_OUTOF10: 8
PAINLEVEL_OUTOF10: 7
PAINLEVEL_OUTOF10: 9
PAINLEVEL_OUTOF10: 0

## 2024-10-13 ASSESSMENT — PAIN DESCRIPTION - LOCATION
LOCATION: BACK
LOCATION: ABDOMEN;BACK
LOCATION: BACK

## 2024-10-13 ASSESSMENT — PAIN - FUNCTIONAL ASSESSMENT: PAIN_FUNCTIONAL_ASSESSMENT: ACTIVITIES ARE NOT PREVENTED

## 2024-10-13 ASSESSMENT — PAIN DESCRIPTION - ORIENTATION
ORIENTATION: LEFT;RIGHT;MID
ORIENTATION: LOWER

## 2024-10-14 ENCOUNTER — APPOINTMENT (OUTPATIENT)
Facility: HOSPITAL | Age: 64
DRG: 698 | End: 2024-10-14
Payer: MEDICARE

## 2024-10-14 LAB
ALBUMIN SERPL-MCNC: 1 G/DL (ref 3.5–5)
ANA SER QL: NEGATIVE
ANION GAP SERPL CALC-SCNC: 4 MMOL/L (ref 2–12)
BASOPHILS # BLD: 0.1 K/UL (ref 0–0.1)
BASOPHILS NFR BLD: 1 % (ref 0–1)
BUN SERPL-MCNC: 25 MG/DL (ref 6–20)
BUN/CREAT SERPL: 13 (ref 12–20)
CALCIUM SERPL-MCNC: 7.7 MG/DL (ref 8.5–10.1)
CHLORIDE SERPL-SCNC: 114 MMOL/L (ref 97–108)
CO2 SERPL-SCNC: 20 MMOL/L (ref 21–32)
CREAT SERPL-MCNC: 1.95 MG/DL (ref 0.7–1.3)
DIFFERENTIAL METHOD BLD: ABNORMAL
EOSINOPHIL # BLD: 0.6 K/UL (ref 0–0.4)
EOSINOPHIL NFR BLD: 8 % (ref 0–7)
ERYTHROCYTE [DISTWIDTH] IN BLOOD BY AUTOMATED COUNT: 15.5 % (ref 11.5–14.5)
GBM IGG SER-ACNC: 0.2 UNITS (ref 0–0.9)
GLUCOSE SERPL-MCNC: 87 MG/DL (ref 65–100)
HCT VFR BLD AUTO: 37.2 % (ref 36.6–50.3)
HGB BLD-MCNC: 12.6 G/DL (ref 12.1–17)
IMM GRANULOCYTES # BLD AUTO: 0 K/UL (ref 0–0.04)
IMM GRANULOCYTES NFR BLD AUTO: 0 % (ref 0–0.5)
INR PPP: 1.1 (ref 0.9–1.1)
LYMPHOCYTES # BLD: 2.1 K/UL (ref 0.8–3.5)
LYMPHOCYTES NFR BLD: 28 % (ref 12–49)
MCH RBC QN AUTO: 30.2 PG (ref 26–34)
MCHC RBC AUTO-ENTMCNC: 33.9 G/DL (ref 30–36.5)
MCV RBC AUTO: 89.2 FL (ref 80–99)
MONOCYTES # BLD: 0.2 K/UL (ref 0–1)
MONOCYTES NFR BLD: 3 % (ref 5–13)
NEUTS SEG # BLD: 4.5 K/UL (ref 1.8–8)
NEUTS SEG NFR BLD: 60 % (ref 32–75)
NRBC # BLD: 0 K/UL (ref 0–0.01)
NRBC BLD-RTO: 0 PER 100 WBC
PHOSPHATE SERPL-MCNC: 3 MG/DL (ref 2.6–4.7)
PHOSPHOLIPASE A2 RECEPTOR, IGG: <1.8 RU/ML (ref 0–19.9)
PLATELET # BLD AUTO: 194 K/UL (ref 150–400)
PMV BLD AUTO: 10.5 FL (ref 8.9–12.9)
POTASSIUM SERPL-SCNC: 3.5 MMOL/L (ref 3.5–5.1)
PROTHROMBIN TIME: 11.1 SEC (ref 9–11.1)
RBC # BLD AUTO: 4.17 M/UL (ref 4.1–5.7)
RBC MORPH BLD: ABNORMAL
SODIUM SERPL-SCNC: 138 MMOL/L (ref 136–145)
WBC # BLD AUTO: 7.5 K/UL (ref 4.1–11.1)

## 2024-10-14 PROCEDURE — 6370000000 HC RX 637 (ALT 250 FOR IP): Performed by: STUDENT IN AN ORGANIZED HEALTH CARE EDUCATION/TRAINING PROGRAM

## 2024-10-14 PROCEDURE — 85025 COMPLETE CBC W/AUTO DIFF WBC: CPT

## 2024-10-14 PROCEDURE — 6360000002 HC RX W HCPCS: Performed by: INTERNAL MEDICINE

## 2024-10-14 PROCEDURE — 2580000003 HC RX 258: Performed by: STUDENT IN AN ORGANIZED HEALTH CARE EDUCATION/TRAINING PROGRAM

## 2024-10-14 PROCEDURE — 2060000000 HC ICU INTERMEDIATE R&B

## 2024-10-14 PROCEDURE — 80069 RENAL FUNCTION PANEL: CPT

## 2024-10-14 PROCEDURE — 6360000002 HC RX W HCPCS: Performed by: STUDENT IN AN ORGANIZED HEALTH CARE EDUCATION/TRAINING PROGRAM

## 2024-10-14 PROCEDURE — 36415 COLL VENOUS BLD VENIPUNCTURE: CPT

## 2024-10-14 PROCEDURE — 85610 PROTHROMBIN TIME: CPT

## 2024-10-14 PROCEDURE — 94761 N-INVAS EAR/PLS OXIMETRY MLT: CPT

## 2024-10-14 RX ORDER — HYDROMORPHONE HYDROCHLORIDE 1 MG/ML
0.5 INJECTION, SOLUTION INTRAMUSCULAR; INTRAVENOUS; SUBCUTANEOUS ONCE
Status: COMPLETED | OUTPATIENT
Start: 2024-10-14 | End: 2024-10-14

## 2024-10-14 RX ORDER — HEPARIN SODIUM 5000 [USP'U]/ML
5000 INJECTION, SOLUTION INTRAVENOUS; SUBCUTANEOUS ONCE
Status: COMPLETED | OUTPATIENT
Start: 2024-10-14 | End: 2024-10-14

## 2024-10-14 RX ORDER — LIDOCAINE 4 G/G
1 PATCH TOPICAL DAILY
Status: DISCONTINUED | OUTPATIENT
Start: 2024-10-14 | End: 2024-10-20 | Stop reason: HOSPADM

## 2024-10-14 RX ORDER — NIFEDIPINE 30 MG/1
30 TABLET, EXTENDED RELEASE ORAL ONCE
Status: COMPLETED | OUTPATIENT
Start: 2024-10-14 | End: 2024-10-14

## 2024-10-14 RX ORDER — NIFEDIPINE 30 MG/1
90 TABLET, EXTENDED RELEASE ORAL DAILY
Status: DISCONTINUED | OUTPATIENT
Start: 2024-10-15 | End: 2024-10-20 | Stop reason: HOSPADM

## 2024-10-14 RX ADMIN — BUMETANIDE 2 MG: 0.25 INJECTION INTRAMUSCULAR; INTRAVENOUS at 09:13

## 2024-10-14 RX ADMIN — HYDROMORPHONE HYDROCHLORIDE 0.25 MG: 1 INJECTION, SOLUTION INTRAMUSCULAR; INTRAVENOUS; SUBCUTANEOUS at 15:09

## 2024-10-14 RX ADMIN — HYDROMORPHONE HYDROCHLORIDE 0.25 MG: 1 INJECTION, SOLUTION INTRAMUSCULAR; INTRAVENOUS; SUBCUTANEOUS at 19:37

## 2024-10-14 RX ADMIN — POLYETHYLENE GLYCOL 3350 17 G: 17 POWDER, FOR SOLUTION ORAL at 09:13

## 2024-10-14 RX ADMIN — PANTOPRAZOLE SODIUM 40 MG: 40 INJECTION, POWDER, FOR SOLUTION INTRAVENOUS at 09:14

## 2024-10-14 RX ADMIN — TIZANIDINE 4 MG: 2 TABLET ORAL at 12:26

## 2024-10-14 RX ADMIN — HYDROMORPHONE HYDROCHLORIDE 0.25 MG: 1 INJECTION, SOLUTION INTRAMUSCULAR; INTRAVENOUS; SUBCUTANEOUS at 04:53

## 2024-10-14 RX ADMIN — HEPARIN SODIUM 5000 UNITS: 5000 INJECTION INTRAVENOUS; SUBCUTANEOUS at 05:07

## 2024-10-14 RX ADMIN — SENNOSIDES AND DOCUSATE SODIUM 2 TABLET: 50; 8.6 TABLET ORAL at 18:06

## 2024-10-14 RX ADMIN — NIFEDIPINE 60 MG: 30 TABLET, FILM COATED, EXTENDED RELEASE ORAL at 09:13

## 2024-10-14 RX ADMIN — BUMETANIDE 2 MG: 0.25 INJECTION INTRAMUSCULAR; INTRAVENOUS at 17:59

## 2024-10-14 RX ADMIN — HYDROMORPHONE HYDROCHLORIDE 0.25 MG: 1 INJECTION, SOLUTION INTRAMUSCULAR; INTRAVENOUS; SUBCUTANEOUS at 10:31

## 2024-10-14 RX ADMIN — HEPARIN SODIUM 5000 UNITS: 5000 INJECTION INTRAVENOUS; SUBCUTANEOUS at 13:38

## 2024-10-14 RX ADMIN — HYDROMORPHONE HYDROCHLORIDE 0.5 MG: 1 INJECTION, SOLUTION INTRAMUSCULAR; INTRAVENOUS; SUBCUTANEOUS at 23:15

## 2024-10-14 RX ADMIN — LOSARTAN POTASSIUM 100 MG: 50 TABLET, FILM COATED ORAL at 09:13

## 2024-10-14 RX ADMIN — NIFEDIPINE 30 MG: 30 TABLET, FILM COATED, EXTENDED RELEASE ORAL at 16:50

## 2024-10-14 ASSESSMENT — PAIN DESCRIPTION - ORIENTATION
ORIENTATION: POSTERIOR
ORIENTATION: MID;LOWER
ORIENTATION: MID;LOWER
ORIENTATION: POSTERIOR

## 2024-10-14 ASSESSMENT — PAIN - FUNCTIONAL ASSESSMENT
PAIN_FUNCTIONAL_ASSESSMENT: ACTIVITIES ARE NOT PREVENTED
PAIN_FUNCTIONAL_ASSESSMENT: PREVENTS OR INTERFERES SOME ACTIVE ACTIVITIES AND ADLS

## 2024-10-14 ASSESSMENT — PAIN SCALES - GENERAL
PAINLEVEL_OUTOF10: 7
PAINLEVEL_OUTOF10: 10
PAINLEVEL_OUTOF10: 5
PAINLEVEL_OUTOF10: 10
PAINLEVEL_OUTOF10: 8
PAINLEVEL_OUTOF10: 10
PAINLEVEL_OUTOF10: 10
PAINLEVEL_OUTOF10: 5

## 2024-10-14 ASSESSMENT — PAIN DESCRIPTION - LOCATION
LOCATION: BACK;LEG
LOCATION: FLANK;BACK
LOCATION: BACK;LEG
LOCATION: BACK;FLANK;LEG
LOCATION: BACK
LOCATION: BACK;LEG
LOCATION: FLANK;BACK
LOCATION: FLANK;BACK

## 2024-10-14 ASSESSMENT — PAIN DESCRIPTION - DESCRIPTORS
DESCRIPTORS: STABBING;DISCOMFORT
DESCRIPTORS: DISCOMFORT;SHARP
DESCRIPTORS: STABBING;DISCOMFORT

## 2024-10-14 ASSESSMENT — PAIN DESCRIPTION - PAIN TYPE: TYPE: ACUTE PAIN

## 2024-10-15 ENCOUNTER — APPOINTMENT (OUTPATIENT)
Facility: HOSPITAL | Age: 64
DRG: 698 | End: 2024-10-15
Payer: MEDICARE

## 2024-10-15 LAB
ALBUMIN SERPL ELPH-MCNC: 1.2 G/DL (ref 2.9–4.4)
ALBUMIN SERPL-MCNC: 1 G/DL (ref 3.5–5)
ALBUMIN/GLOB SERPL: 0.3 (ref 0.7–1.7)
ALPHA1 GLOB SERPL ELPH-MCNC: 0.2 G/DL (ref 0–0.4)
ALPHA2 GLOB SERPL ELPH-MCNC: 0.9 G/DL (ref 0.4–1)
ANION GAP SERPL CALC-SCNC: 2 MMOL/L (ref 2–12)
B-GLOBULIN SERPL ELPH-MCNC: 1.7 G/DL (ref 0.7–1.3)
BASOPHILS # BLD: 0.2 K/UL (ref 0–0.1)
BASOPHILS NFR BLD: 2 % (ref 0–1)
BUN SERPL-MCNC: 26 MG/DL (ref 6–20)
BUN/CREAT SERPL: 13 (ref 12–20)
C-ANCA TITR SER IF: NORMAL TITER
CALCIUM SERPL-MCNC: 7.3 MG/DL (ref 8.5–10.1)
CHLORIDE SERPL-SCNC: 115 MMOL/L (ref 97–108)
CO2 SERPL-SCNC: 23 MMOL/L (ref 21–32)
COMMENT:: NORMAL
CREAT SERPL-MCNC: 2 MG/DL (ref 0.7–1.3)
DIFFERENTIAL METHOD BLD: ABNORMAL
EOSINOPHIL # BLD: 0.7 K/UL (ref 0–0.4)
EOSINOPHIL NFR BLD: 10 % (ref 0–7)
ERYTHROCYTE [DISTWIDTH] IN BLOOD BY AUTOMATED COUNT: 15.1 % (ref 11.5–14.5)
GAMMA GLOB SERPL ELPH-MCNC: 2.6 G/DL (ref 0.4–1.8)
GLOBULIN SER-MCNC: 5.3 G/DL (ref 2.2–3.9)
GLUCOSE SERPL-MCNC: 106 MG/DL (ref 65–100)
HCT VFR BLD AUTO: 36.5 % (ref 36.6–50.3)
HGB BLD-MCNC: 12.5 G/DL (ref 12.1–17)
IGA SERPL-MCNC: 1240 MG/DL (ref 61–437)
IGG SERPL-MCNC: 3102 MG/DL (ref 603–1613)
IGM SERPL-MCNC: 89 MG/DL (ref 20–172)
IMM GRANULOCYTES # BLD AUTO: 0 K/UL (ref 0–0.04)
IMM GRANULOCYTES NFR BLD AUTO: 0 % (ref 0–0.5)
INTERPRETATION SERPL IEP-IMP: ABNORMAL
KAPPA LC FREE SER-MCNC: 470.5 MG/L (ref 3.3–19.4)
KAPPA LC FREE/LAMBDA FREE SER: 1.61 (ref 0.26–1.65)
LAMBDA LC FREE SERPL-MCNC: 291.6 MG/L (ref 5.7–26.3)
LYMPHOCYTES # BLD: 1.8 K/UL (ref 0.8–3.5)
LYMPHOCYTES NFR BLD: 26 % (ref 12–49)
M PROTEIN SERPL ELPH-MCNC: ABNORMAL G/DL
MCH RBC QN AUTO: 30.3 PG (ref 26–34)
MCHC RBC AUTO-ENTMCNC: 34.2 G/DL (ref 30–36.5)
MCV RBC AUTO: 88.4 FL (ref 80–99)
MONOCYTES # BLD: 0.3 K/UL (ref 0–1)
MONOCYTES NFR BLD: 4 % (ref 5–13)
MYELOPEROXIDASE AB SER IA-ACNC: <0.2 UNITS (ref 0–0.9)
NEUTS SEG # BLD: 3.9 K/UL (ref 1.8–8)
NEUTS SEG NFR BLD: 58 % (ref 32–75)
NRBC # BLD: 0 K/UL (ref 0–0.01)
NRBC BLD-RTO: 0 PER 100 WBC
P-ANCA ATYPICAL TITR SER IF: NORMAL TITER
P-ANCA TITR SER IF: NORMAL TITER
PHOSPHATE SERPL-MCNC: 2.8 MG/DL (ref 2.6–4.7)
PLATELET # BLD AUTO: 203 K/UL (ref 150–400)
PMV BLD AUTO: 11 FL (ref 8.9–12.9)
POTASSIUM SERPL-SCNC: 3.9 MMOL/L (ref 3.5–5.1)
PROT SERPL-MCNC: 6.5 G/DL (ref 6–8.5)
PROTEINASE3 AB SER IA-ACNC: 0.2 UNITS (ref 0–0.9)
RBC # BLD AUTO: 4.13 M/UL (ref 4.1–5.7)
SODIUM SERPL-SCNC: 140 MMOL/L (ref 136–145)
SPECIMEN HOLD: NORMAL
WBC # BLD AUTO: 6.9 K/UL (ref 4.1–11.1)

## 2024-10-15 PROCEDURE — 0TB03ZX EXCISION OF RIGHT KIDNEY, PERCUTANEOUS APPROACH, DIAGNOSTIC: ICD-10-PCS | Performed by: STUDENT IN AN ORGANIZED HEALTH CARE EDUCATION/TRAINING PROGRAM

## 2024-10-15 PROCEDURE — 99153 MOD SED SAME PHYS/QHP EA: CPT | Performed by: PHYSICIAN ASSISTANT

## 2024-10-15 PROCEDURE — 6360000002 HC RX W HCPCS: Performed by: STUDENT IN AN ORGANIZED HEALTH CARE EDUCATION/TRAINING PROGRAM

## 2024-10-15 PROCEDURE — 6370000000 HC RX 637 (ALT 250 FOR IP): Performed by: STUDENT IN AN ORGANIZED HEALTH CARE EDUCATION/TRAINING PROGRAM

## 2024-10-15 PROCEDURE — 6370000000 HC RX 637 (ALT 250 FOR IP)

## 2024-10-15 PROCEDURE — 6360000002 HC RX W HCPCS: Performed by: INTERNAL MEDICINE

## 2024-10-15 PROCEDURE — 2709999900 CT BIOPSY RENAL

## 2024-10-15 PROCEDURE — 2580000003 HC RX 258: Performed by: STUDENT IN AN ORGANIZED HEALTH CARE EDUCATION/TRAINING PROGRAM

## 2024-10-15 PROCEDURE — 6360000002 HC RX W HCPCS: Performed by: PHYSICIAN ASSISTANT

## 2024-10-15 PROCEDURE — 80069 RENAL FUNCTION PANEL: CPT

## 2024-10-15 PROCEDURE — 99152 MOD SED SAME PHYS/QHP 5/>YRS: CPT | Performed by: PHYSICIAN ASSISTANT

## 2024-10-15 PROCEDURE — 36415 COLL VENOUS BLD VENIPUNCTURE: CPT

## 2024-10-15 PROCEDURE — 6370000000 HC RX 637 (ALT 250 FOR IP): Performed by: PHYSICIAN ASSISTANT

## 2024-10-15 PROCEDURE — 2060000000 HC ICU INTERMEDIATE R&B

## 2024-10-15 PROCEDURE — 94761 N-INVAS EAR/PLS OXIMETRY MLT: CPT

## 2024-10-15 PROCEDURE — 85025 COMPLETE CBC W/AUTO DIFF WBC: CPT

## 2024-10-15 PROCEDURE — 88305 TISSUE EXAM BY PATHOLOGIST: CPT

## 2024-10-15 RX ORDER — FENTANYL CITRATE 50 UG/ML
100 INJECTION, SOLUTION INTRAMUSCULAR; INTRAVENOUS AS NEEDED
Status: DISCONTINUED | OUTPATIENT
Start: 2024-10-15 | End: 2024-10-15

## 2024-10-15 RX ORDER — OXYCODONE HYDROCHLORIDE 5 MG/1
5 TABLET ORAL EVERY 4 HOURS PRN
Status: DISCONTINUED | OUTPATIENT
Start: 2024-10-15 | End: 2024-10-20 | Stop reason: HOSPADM

## 2024-10-15 RX ORDER — MIDAZOLAM HYDROCHLORIDE 1 MG/ML
5 INJECTION, SOLUTION INTRAMUSCULAR; INTRAVENOUS AS NEEDED
Status: DISCONTINUED | OUTPATIENT
Start: 2024-10-15 | End: 2024-10-15

## 2024-10-15 RX ORDER — SENNA AND DOCUSATE SODIUM 50; 8.6 MG/1; MG/1
2 TABLET, FILM COATED ORAL DAILY
Status: DISCONTINUED | OUTPATIENT
Start: 2024-10-15 | End: 2024-10-20 | Stop reason: HOSPADM

## 2024-10-15 RX ORDER — FENTANYL CITRATE 50 UG/ML
25 INJECTION, SOLUTION INTRAMUSCULAR; INTRAVENOUS
Status: DISCONTINUED | OUTPATIENT
Start: 2024-10-15 | End: 2024-10-15

## 2024-10-15 RX ORDER — FENTANYL CITRATE 50 UG/ML
50 INJECTION, SOLUTION INTRAMUSCULAR; INTRAVENOUS EVERY 4 HOURS PRN
Status: DISPENSED | OUTPATIENT
Start: 2024-10-15 | End: 2024-10-17

## 2024-10-15 RX ADMIN — BUMETANIDE 2 MG: 0.25 INJECTION INTRAMUSCULAR; INTRAVENOUS at 17:50

## 2024-10-15 RX ADMIN — HYDRALAZINE HYDROCHLORIDE 10 MG: 20 INJECTION INTRAMUSCULAR; INTRAVENOUS at 05:12

## 2024-10-15 RX ADMIN — FENTANYL CITRATE 25 MCG: 50 INJECTION INTRAMUSCULAR; INTRAVENOUS at 12:26

## 2024-10-15 RX ADMIN — PANTOPRAZOLE SODIUM 40 MG: 40 INJECTION, POWDER, FOR SOLUTION INTRAVENOUS at 07:45

## 2024-10-15 RX ADMIN — SENNOSIDES AND DOCUSATE SODIUM 2 TABLET: 50; 8.6 TABLET ORAL at 14:47

## 2024-10-15 RX ADMIN — MIDAZOLAM HYDROCHLORIDE 1 MG: 1 INJECTION, SOLUTION INTRAMUSCULAR; INTRAVENOUS at 12:20

## 2024-10-15 RX ADMIN — NIFEDIPINE 90 MG: 30 TABLET, FILM COATED, EXTENDED RELEASE ORAL at 07:45

## 2024-10-15 RX ADMIN — Medication 1 EACH: at 12:48

## 2024-10-15 RX ADMIN — BUMETANIDE 2 MG: 0.25 INJECTION INTRAMUSCULAR; INTRAVENOUS at 07:45

## 2024-10-15 RX ADMIN — FENTANYL CITRATE 50 MCG: 50 INJECTION INTRAMUSCULAR; INTRAVENOUS at 22:35

## 2024-10-15 RX ADMIN — LOSARTAN POTASSIUM 100 MG: 50 TABLET, FILM COATED ORAL at 07:45

## 2024-10-15 RX ADMIN — FENTANYL CITRATE 25 MCG: 50 INJECTION INTRAMUSCULAR; INTRAVENOUS at 11:32

## 2024-10-15 RX ADMIN — MIDAZOLAM HYDROCHLORIDE 1 MG: 1 INJECTION, SOLUTION INTRAMUSCULAR; INTRAVENOUS at 12:30

## 2024-10-15 RX ADMIN — FENTANYL CITRATE 25 MCG: 50 INJECTION INTRAMUSCULAR; INTRAVENOUS at 12:21

## 2024-10-15 RX ADMIN — FENTANYL CITRATE 25 MCG: 50 INJECTION INTRAMUSCULAR; INTRAVENOUS at 12:16

## 2024-10-15 RX ADMIN — MIDAZOLAM HYDROCHLORIDE 2 MG: 1 INJECTION, SOLUTION INTRAMUSCULAR; INTRAVENOUS at 12:25

## 2024-10-15 RX ADMIN — MIDAZOLAM HYDROCHLORIDE 1 MG: 1 INJECTION, SOLUTION INTRAMUSCULAR; INTRAVENOUS at 12:15

## 2024-10-15 RX ADMIN — OXYCODONE 5 MG: 5 TABLET ORAL at 20:36

## 2024-10-15 RX ADMIN — HYDROMORPHONE HYDROCHLORIDE 0.25 MG: 1 INJECTION, SOLUTION INTRAMUSCULAR; INTRAVENOUS; SUBCUTANEOUS at 03:26

## 2024-10-15 RX ADMIN — FENTANYL CITRATE 50 MCG: 50 INJECTION INTRAMUSCULAR; INTRAVENOUS at 18:34

## 2024-10-15 RX ADMIN — POLYETHYLENE GLYCOL 3350 17 G: 17 POWDER, FOR SOLUTION ORAL at 14:46

## 2024-10-15 RX ADMIN — HYDROMORPHONE HYDROCHLORIDE 0.25 MG: 1 INJECTION, SOLUTION INTRAMUSCULAR; INTRAVENOUS; SUBCUTANEOUS at 07:58

## 2024-10-15 ASSESSMENT — PAIN SCALES - GENERAL
PAINLEVEL_OUTOF10: 7
PAINLEVEL_OUTOF10: 10
PAINLEVEL_OUTOF10: 4
PAINLEVEL_OUTOF10: 0
PAINLEVEL_OUTOF10: 5
PAINLEVEL_OUTOF10: 0
PAINLEVEL_OUTOF10: 7
PAINLEVEL_OUTOF10: 8
PAINLEVEL_OUTOF10: 0
PAINLEVEL_OUTOF10: 5
PAINLEVEL_OUTOF10: 5
PAINLEVEL_OUTOF10: 3
PAINLEVEL_OUTOF10: 7
PAINLEVEL_OUTOF10: 8
PAINLEVEL_OUTOF10: 0
PAINLEVEL_OUTOF10: 4

## 2024-10-15 ASSESSMENT — PAIN DESCRIPTION - LOCATION
LOCATION: BACK;FLANK
LOCATION: BACK;LEG
LOCATION: BACK
LOCATION: BACK;FLANK
LOCATION: BACK;FLANK
LOCATION: BACK
LOCATION: BACK;FLANK
LOCATION: BACK;FLANK
LOCATION: BACK
LOCATION: BACK

## 2024-10-15 ASSESSMENT — PAIN DESCRIPTION - ORIENTATION
ORIENTATION: POSTERIOR
ORIENTATION: LOWER
ORIENTATION: POSTERIOR
ORIENTATION: POSTERIOR
ORIENTATION: LOWER
ORIENTATION: RIGHT;LEFT;MID;LOWER

## 2024-10-15 ASSESSMENT — PAIN - FUNCTIONAL ASSESSMENT
PAIN_FUNCTIONAL_ASSESSMENT: PREVENTS OR INTERFERES SOME ACTIVE ACTIVITIES AND ADLS

## 2024-10-15 ASSESSMENT — PAIN DESCRIPTION - DESCRIPTORS
DESCRIPTORS: STABBING;DISCOMFORT
DESCRIPTORS: SHARP;SHOOTING
DESCRIPTORS: STABBING;DISCOMFORT
DESCRIPTORS: SHARP;SHOOTING

## 2024-10-15 ASSESSMENT — PAIN DESCRIPTION - PAIN TYPE: TYPE: CHRONIC PAIN

## 2024-10-15 NOTE — PLAN OF CARE
Problem: Discharge Planning  Goal: Discharge to home or other facility with appropriate resources  10/15/2024 0013 by Lavell Mari RN  Outcome: Progressing  Flowsheets (Taken 10/14/2024 1930)  Discharge to home or other facility with appropriate resources: Identify barriers to discharge with patient and caregiver  10/14/2024 1835 by Flora Meng RN  Outcome: Progressing  10/14/2024 1835 by Flora Meng RN  Outcome: Progressing     Problem: Pain  Goal: Verbalizes/displays adequate comfort level or baseline comfort level  10/15/2024 0013 by Lavell Mari RN  Outcome: Progressing  10/14/2024 1835 by Flora Meng RN  Outcome: Progressing  10/14/2024 1835 by Flora Meng RN  Outcome: Progressing     Problem: Safety - Adult  Goal: Free from fall injury  10/15/2024 0013 by Lavell Mari RN  Outcome: Progressing  10/14/2024 1835 by Flora Meng RN  Outcome: Progressing  10/14/2024 1835 by Flora Meng RN  Outcome: Progressing

## 2024-10-15 NOTE — H&P
INTERVENTIONAL RADIOLOGY  Preoperative History and Physical      Patient:  Zoltan Fontana Jr  :  1960  Age:  64 y.o.  MRN:  [id number]  Today's Date:  10/15/2024      CC / HPI   Zoltan Fontana Jr is a 64 y.o. male with a history of DEBRA with proteinuria who presents for CT guided core renal biopsy.    PAST MEDICAL HISTORY  History reviewed. No pertinent past medical history.    PAST SURGICAL HISTORY  Past Surgical History:   Procedure Laterality Date    ANTERIOR CRUCIATE LIGAMENT REPAIR      BACK SURGERY  2010    rods and screws in the back, fusion. 4 surgeries total       SOCIAL HISTORY  Social History     Socioeconomic History    Marital status:      Spouse name: Not on file    Number of children: Not on file    Years of education: Not on file    Highest education level: Not on file   Occupational History    Not on file   Tobacco Use    Smoking status: Never    Smokeless tobacco: Never   Substance and Sexual Activity    Alcohol use: Yes    Drug use: Never    Sexual activity: Not on file   Other Topics Concern    Not on file   Social History Narrative    Not on file     Social Determinants of Health     Financial Resource Strain: Low Risk  (2024)    Overall Financial Resource Strain (CARDIA)     Difficulty of Paying Living Expenses: Not hard at all   Food Insecurity: No Food Insecurity (10/14/2024)    Hunger Vital Sign     Worried About Running Out of Food in the Last Year: Never true     Ran Out of Food in the Last Year: Never true   Transportation Needs: Unmet Transportation Needs (10/14/2024)    PRAPARE - Transportation     Lack of Transportation (Medical): Yes     Lack of Transportation (Non-Medical): Yes   Physical Activity: Not on file   Stress: Not on file   Social Connections: Not on file   Intimate Partner Violence: Not on file   Housing Stability: Low Risk  (10/14/2024)    Housing Stability Vital Sign     Unable to Pay for Housing in the Last Year: No     Number of Times Moved in the Last 
fever, chills, sweats, weakness, weight loss  Eyes: negative for blurred vision, eye pain, loss of vision, diplopia  Ear Nose and Throat: negative for rhinorrhea, pharyngitis, otalgia, tinnitus, speech or swallowing difficulties  Respiratory: Shortness of breath positive  Cardiology:   Chest tightness positive  Gastrointestinal: negative for abdominal pain, N/V, dysphagia, change in bowel habits, bleeding  Genitourinary: negative for frequency, urgency, dysuria, hematuria, incontinence  Muskuloskeletal : Back pain positive  Hematology: negative for easy bruising, bleeding, lymphadenopathy  Dermatological: negative for rash, ulceration, mole change, new lesion  Endocrine: negative for hot flashes or polydipsia  Neurological: negative for headache, dizziness, confusion, focal weakness, paresthesia, memory loss, gait disturbance  Psychological: negative for anxiety, depression, agitation      Objective:   VITALS:    Vitals:    10/10/24 1501   BP: (!) 178/105   Pulse:    Resp:    Temp:    SpO2:      PHYSICAL EXAM:    Physical Exam:    Gen: Well-developed, well-nourished, in no acute distress  HEENT:  Pink conjunctivae, PERRL, hearing intact to voice, moist mucous membranes  Neck: Supple, without masses, thyroid non-tender  Resp: No accessory muscle use, clear breath sounds without wheezes rales or rhonchi  Card: No murmurs, normal S1, S2 without thrills, bruits or peripheral edema  Abd:  Soft, non-tender, non-distended, normoactive bowel sounds are present, no palpable organomegaly and no detectable hernias  Lymph:  No cervical or inguinal adenopathy  Musc: No cyanosis or clubbing  Skin: No rashes or ulcers, skin turgor is good  Neuro:  Cranial nerves are grossly intact, no focal motor weakness, follows commands appropriately  Psych:  Good insight, oriented to person, place and time, alert          _______________________________________________________________________  Care Plan discussed with:    Comments   Patient x

## 2024-10-15 NOTE — CARE COORDINATION
2:27 PM  10/15/24    Care Management Progress Note    Reason for Admission:   ARF (acute renal failure) (HCC) [N17.9]  Acute renal failure, unspecified acute renal failure type (HCC) [N17.9]  Community acquired pneumonia, unspecified laterality [J18.9]         Patient Admission Status: Inpatient  Date Admitted to INP:  10/10/24   RUR: Readmission Risk Score: 10.7    Hospitalization in the last 30 days (Readmission):  No        Transition of care plan:  Ongoing medical management- pt discussed during IDR; renal biopsy planned for today  Anticipated discharge plan is home with family assistance as needed; no PT/OT needs.  Date IM given: 10/14/24  Outpatient follow-up.  Discharge transport: Family will transport at discharge.      CM will continue to follow and assist with discharge needs.    ADRIAN Stevens

## 2024-10-16 ENCOUNTER — APPOINTMENT (OUTPATIENT)
Facility: HOSPITAL | Age: 64
DRG: 698 | End: 2024-10-16
Payer: MEDICARE

## 2024-10-16 LAB
ANION GAP SERPL CALC-SCNC: 5 MMOL/L (ref 2–12)
BASOPHILS # BLD: 0.2 K/UL (ref 0–0.1)
BASOPHILS NFR BLD: 2 % (ref 0–1)
BUN SERPL-MCNC: 26 MG/DL (ref 6–20)
BUN/CREAT SERPL: 14 (ref 12–20)
CALCIUM SERPL-MCNC: 7.6 MG/DL (ref 8.5–10.1)
CHLORIDE SERPL-SCNC: 112 MMOL/L (ref 97–108)
CO2 SERPL-SCNC: 21 MMOL/L (ref 21–32)
CREAT SERPL-MCNC: 1.87 MG/DL (ref 0.7–1.3)
DIFFERENTIAL METHOD BLD: ABNORMAL
EOSINOPHIL # BLD: 0.3 K/UL (ref 0–0.4)
EOSINOPHIL NFR BLD: 4 % (ref 0–7)
ERYTHROCYTE [DISTWIDTH] IN BLOOD BY AUTOMATED COUNT: 14.8 % (ref 11.5–14.5)
GLUCOSE SERPL-MCNC: 100 MG/DL (ref 65–100)
HCT VFR BLD AUTO: 38.5 % (ref 36.6–50.3)
HGB BLD-MCNC: 13.3 G/DL (ref 12.1–17)
IMM GRANULOCYTES # BLD AUTO: 0 K/UL (ref 0–0.04)
IMM GRANULOCYTES NFR BLD AUTO: 0 % (ref 0–0.5)
LDH SERPL L TO P-CCNC: 348 U/L (ref 85–241)
LIPASE SERPL-CCNC: 23 U/L (ref 13–75)
LYMPHOCYTES # BLD: 1.5 K/UL (ref 0.8–3.5)
LYMPHOCYTES NFR BLD: 19 % (ref 12–49)
MCH RBC QN AUTO: 30.5 PG (ref 26–34)
MCHC RBC AUTO-ENTMCNC: 34.5 G/DL (ref 30–36.5)
MCV RBC AUTO: 88.3 FL (ref 80–99)
MONOCYTES # BLD: 0.5 K/UL (ref 0–1)
MONOCYTES NFR BLD: 7 % (ref 5–13)
NEUTS SEG # BLD: 5.3 K/UL (ref 1.8–8)
NEUTS SEG NFR BLD: 68 % (ref 32–75)
NRBC # BLD: 0 K/UL (ref 0–0.01)
NRBC BLD-RTO: 0 PER 100 WBC
PLATELET # BLD AUTO: 214 K/UL (ref 150–400)
PMV BLD AUTO: 10.2 FL (ref 8.9–12.9)
POTASSIUM SERPL-SCNC: 3.6 MMOL/L (ref 3.5–5.1)
RBC # BLD AUTO: 4.36 M/UL (ref 4.1–5.7)
SODIUM SERPL-SCNC: 138 MMOL/L (ref 136–145)
WBC # BLD AUTO: 7.8 K/UL (ref 4.1–11.1)

## 2024-10-16 PROCEDURE — 6360000002 HC RX W HCPCS: Performed by: STUDENT IN AN ORGANIZED HEALTH CARE EDUCATION/TRAINING PROGRAM

## 2024-10-16 PROCEDURE — 2580000003 HC RX 258: Performed by: STUDENT IN AN ORGANIZED HEALTH CARE EDUCATION/TRAINING PROGRAM

## 2024-10-16 PROCEDURE — 6360000002 HC RX W HCPCS

## 2024-10-16 PROCEDURE — 36415 COLL VENOUS BLD VENIPUNCTURE: CPT

## 2024-10-16 PROCEDURE — 1100000000 HC RM PRIVATE

## 2024-10-16 PROCEDURE — 6360000002 HC RX W HCPCS: Performed by: INTERNAL MEDICINE

## 2024-10-16 PROCEDURE — 74177 CT ABD & PELVIS W/CONTRAST: CPT

## 2024-10-16 PROCEDURE — 80048 BASIC METABOLIC PNL TOTAL CA: CPT

## 2024-10-16 PROCEDURE — 6370000000 HC RX 637 (ALT 250 FOR IP): Performed by: STUDENT IN AN ORGANIZED HEALTH CARE EDUCATION/TRAINING PROGRAM

## 2024-10-16 PROCEDURE — 85025 COMPLETE CBC W/AUTO DIFF WBC: CPT

## 2024-10-16 PROCEDURE — 83615 LACTATE (LD) (LDH) ENZYME: CPT

## 2024-10-16 PROCEDURE — 83690 ASSAY OF LIPASE: CPT

## 2024-10-16 PROCEDURE — 6370000000 HC RX 637 (ALT 250 FOR IP)

## 2024-10-16 PROCEDURE — 6360000004 HC RX CONTRAST MEDICATION: Performed by: STUDENT IN AN ORGANIZED HEALTH CARE EDUCATION/TRAINING PROGRAM

## 2024-10-16 RX ORDER — IOPAMIDOL 755 MG/ML
100 INJECTION, SOLUTION INTRAVASCULAR
Status: COMPLETED | OUTPATIENT
Start: 2024-10-16 | End: 2024-10-16

## 2024-10-16 RX ADMIN — HYDRALAZINE HYDROCHLORIDE 10 MG: 20 INJECTION INTRAMUSCULAR; INTRAVENOUS at 00:41

## 2024-10-16 RX ADMIN — OXYCODONE 5 MG: 5 TABLET ORAL at 00:37

## 2024-10-16 RX ADMIN — FENTANYL CITRATE 50 MCG: 50 INJECTION INTRAMUSCULAR; INTRAVENOUS at 16:08

## 2024-10-16 RX ADMIN — OXYCODONE 5 MG: 5 TABLET ORAL at 06:50

## 2024-10-16 RX ADMIN — PROCHLORPERAZINE EDISYLATE 5 MG: 5 INJECTION INTRAMUSCULAR; INTRAVENOUS at 22:48

## 2024-10-16 RX ADMIN — BUMETANIDE 2 MG: 0.25 INJECTION INTRAMUSCULAR; INTRAVENOUS at 08:49

## 2024-10-16 RX ADMIN — FENTANYL CITRATE 50 MCG: 50 INJECTION INTRAMUSCULAR; INTRAVENOUS at 20:11

## 2024-10-16 RX ADMIN — FENTANYL CITRATE 50 MCG: 50 INJECTION INTRAMUSCULAR; INTRAVENOUS at 04:07

## 2024-10-16 RX ADMIN — BUMETANIDE 2 MG: 0.25 INJECTION INTRAMUSCULAR; INTRAVENOUS at 17:57

## 2024-10-16 RX ADMIN — OXYCODONE 5 MG: 5 TABLET ORAL at 10:56

## 2024-10-16 RX ADMIN — HEPARIN SODIUM 5000 UNITS: 5000 INJECTION INTRAVENOUS; SUBCUTANEOUS at 14:10

## 2024-10-16 RX ADMIN — HYDRALAZINE HYDROCHLORIDE 10 MG: 20 INJECTION INTRAMUSCULAR; INTRAVENOUS at 18:56

## 2024-10-16 RX ADMIN — FENTANYL CITRATE 50 MCG: 50 INJECTION INTRAMUSCULAR; INTRAVENOUS at 12:09

## 2024-10-16 RX ADMIN — SENNOSIDES AND DOCUSATE SODIUM 2 TABLET: 50; 8.6 TABLET ORAL at 08:35

## 2024-10-16 RX ADMIN — PANTOPRAZOLE SODIUM 40 MG: 40 INJECTION, POWDER, FOR SOLUTION INTRAVENOUS at 09:15

## 2024-10-16 RX ADMIN — POLYETHYLENE GLYCOL 3350 17 G: 17 POWDER, FOR SOLUTION ORAL at 09:28

## 2024-10-16 RX ADMIN — IOPAMIDOL 100 ML: 755 INJECTION, SOLUTION INTRAVENOUS at 12:42

## 2024-10-16 RX ADMIN — NIFEDIPINE 90 MG: 30 TABLET, FILM COATED, EXTENDED RELEASE ORAL at 08:31

## 2024-10-16 RX ADMIN — LOSARTAN POTASSIUM 100 MG: 50 TABLET, FILM COATED ORAL at 08:38

## 2024-10-16 RX ADMIN — OXYCODONE 5 MG: 5 TABLET ORAL at 18:16

## 2024-10-16 ASSESSMENT — PAIN DESCRIPTION - PAIN TYPE
TYPE: CHRONIC PAIN
TYPE: CHRONIC PAIN

## 2024-10-16 ASSESSMENT — PAIN SCALES - GENERAL
PAINLEVEL_OUTOF10: 9
PAINLEVEL_OUTOF10: 7
PAINLEVEL_OUTOF10: 7
PAINLEVEL_OUTOF10: 5
PAINLEVEL_OUTOF10: 7
PAINLEVEL_OUTOF10: 5
PAINLEVEL_OUTOF10: 3
PAINLEVEL_OUTOF10: 3
PAINLEVEL_OUTOF10: 5
PAINLEVEL_OUTOF10: 7
PAINLEVEL_OUTOF10: 7
PAINLEVEL_OUTOF10: 5

## 2024-10-16 ASSESSMENT — PAIN DESCRIPTION - ORIENTATION
ORIENTATION: POSTERIOR
ORIENTATION: LOWER
ORIENTATION: LOWER
ORIENTATION: OTHER (COMMENT)
ORIENTATION: LOWER
ORIENTATION: POSTERIOR;ANTERIOR
ORIENTATION: OTHER (COMMENT)

## 2024-10-16 ASSESSMENT — PAIN DESCRIPTION - DESCRIPTORS
DESCRIPTORS: SHARP;STABBING
DESCRIPTORS: SHARP;STABBING
DESCRIPTORS: SHARP;STABBING;SQUEEZING
DESCRIPTORS: SHARP;STABBING
DESCRIPTORS: ACHING
DESCRIPTORS: ACHING;SHARP
DESCRIPTORS: SHARP;STABBING

## 2024-10-16 ASSESSMENT — PAIN DESCRIPTION - LOCATION
LOCATION: BACK;ABDOMEN
LOCATION: ABDOMEN;BACK
LOCATION: ABDOMEN
LOCATION: ABDOMEN;BACK
LOCATION: ABDOMEN;BACK
LOCATION: BACK

## 2024-10-16 ASSESSMENT — PAIN DESCRIPTION - FREQUENCY
FREQUENCY: CONTINUOUS
FREQUENCY: CONTINUOUS

## 2024-10-16 ASSESSMENT — PAIN - FUNCTIONAL ASSESSMENT
PAIN_FUNCTIONAL_ASSESSMENT: ACTIVITIES ARE NOT PREVENTED
PAIN_FUNCTIONAL_ASSESSMENT: ACTIVITIES ARE NOT PREVENTED

## 2024-10-16 ASSESSMENT — PAIN DESCRIPTION - ONSET: ONSET: ON-GOING

## 2024-10-16 ASSESSMENT — PAIN SCALES - WONG BAKER: WONGBAKER_NUMERICALRESPONSE: NO HURT

## 2024-10-17 LAB
ANION GAP SERPL CALC-SCNC: 5 MMOL/L (ref 2–12)
BUN SERPL-MCNC: 24 MG/DL (ref 6–20)
BUN/CREAT SERPL: 14 (ref 12–20)
CALCIUM SERPL-MCNC: 7.5 MG/DL (ref 8.5–10.1)
CHLORIDE SERPL-SCNC: 107 MMOL/L (ref 97–108)
CO2 SERPL-SCNC: 25 MMOL/L (ref 21–32)
CREAT SERPL-MCNC: 1.69 MG/DL (ref 0.7–1.3)
ERYTHROCYTE [DISTWIDTH] IN BLOOD BY AUTOMATED COUNT: 14.8 % (ref 11.5–14.5)
GLUCOSE SERPL-MCNC: 104 MG/DL (ref 65–100)
HCT VFR BLD AUTO: 35.1 % (ref 36.6–50.3)
HGB BLD-MCNC: 12.1 G/DL (ref 12.1–17)
MCH RBC QN AUTO: 30.1 PG (ref 26–34)
MCHC RBC AUTO-ENTMCNC: 34.5 G/DL (ref 30–36.5)
MCV RBC AUTO: 87.3 FL (ref 80–99)
NRBC # BLD: 0 K/UL (ref 0–0.01)
NRBC BLD-RTO: 0 PER 100 WBC
PLATELET # BLD AUTO: 201 K/UL (ref 150–400)
PMV BLD AUTO: 10 FL (ref 8.9–12.9)
POTASSIUM SERPL-SCNC: 3.5 MMOL/L (ref 3.5–5.1)
RBC # BLD AUTO: 4.02 M/UL (ref 4.1–5.7)
SODIUM SERPL-SCNC: 137 MMOL/L (ref 136–145)
WBC # BLD AUTO: 6.1 K/UL (ref 4.1–11.1)

## 2024-10-17 PROCEDURE — 6370000000 HC RX 637 (ALT 250 FOR IP)

## 2024-10-17 PROCEDURE — 1100000000 HC RM PRIVATE

## 2024-10-17 PROCEDURE — 2580000003 HC RX 258: Performed by: STUDENT IN AN ORGANIZED HEALTH CARE EDUCATION/TRAINING PROGRAM

## 2024-10-17 PROCEDURE — 6360000002 HC RX W HCPCS: Performed by: STUDENT IN AN ORGANIZED HEALTH CARE EDUCATION/TRAINING PROGRAM

## 2024-10-17 PROCEDURE — 6360000002 HC RX W HCPCS: Performed by: INTERNAL MEDICINE

## 2024-10-17 PROCEDURE — 85027 COMPLETE CBC AUTOMATED: CPT

## 2024-10-17 PROCEDURE — 6370000000 HC RX 637 (ALT 250 FOR IP): Performed by: NURSE PRACTITIONER

## 2024-10-17 PROCEDURE — 80048 BASIC METABOLIC PNL TOTAL CA: CPT

## 2024-10-17 PROCEDURE — 6360000002 HC RX W HCPCS

## 2024-10-17 PROCEDURE — 36415 COLL VENOUS BLD VENIPUNCTURE: CPT

## 2024-10-17 PROCEDURE — 6370000000 HC RX 637 (ALT 250 FOR IP): Performed by: STUDENT IN AN ORGANIZED HEALTH CARE EDUCATION/TRAINING PROGRAM

## 2024-10-17 PROCEDURE — 6360000002 HC RX W HCPCS: Performed by: NURSE PRACTITIONER

## 2024-10-17 RX ORDER — ONDANSETRON 2 MG/ML
4 INJECTION INTRAMUSCULAR; INTRAVENOUS EVERY 6 HOURS PRN
Status: DISCONTINUED | OUTPATIENT
Start: 2024-10-17 | End: 2024-10-20 | Stop reason: HOSPADM

## 2024-10-17 RX ORDER — DICYCLOMINE HCL 20 MG
20 TABLET ORAL ONCE
Status: COMPLETED | OUTPATIENT
Start: 2024-10-17 | End: 2024-10-17

## 2024-10-17 RX ORDER — FENTANYL CITRATE 50 UG/ML
50 INJECTION, SOLUTION INTRAMUSCULAR; INTRAVENOUS ONCE
Status: COMPLETED | OUTPATIENT
Start: 2024-10-17 | End: 2024-10-17

## 2024-10-17 RX ORDER — HYDRALAZINE HYDROCHLORIDE 20 MG/ML
10 INJECTION INTRAMUSCULAR; INTRAVENOUS ONCE
Status: COMPLETED | OUTPATIENT
Start: 2024-10-17 | End: 2024-10-17

## 2024-10-17 RX ORDER — BISACODYL 10 MG
10 SUPPOSITORY, RECTAL RECTAL DAILY PRN
Status: DISCONTINUED | OUTPATIENT
Start: 2024-10-17 | End: 2024-10-20 | Stop reason: HOSPADM

## 2024-10-17 RX ADMIN — SENNOSIDES AND DOCUSATE SODIUM 2 TABLET: 50; 8.6 TABLET ORAL at 09:11

## 2024-10-17 RX ADMIN — FENTANYL CITRATE 50 MCG: 50 INJECTION INTRAMUSCULAR; INTRAVENOUS at 17:33

## 2024-10-17 RX ADMIN — BISACODYL 10 MG: 10 SUPPOSITORY RECTAL at 23:28

## 2024-10-17 RX ADMIN — LOSARTAN POTASSIUM 100 MG: 50 TABLET, FILM COATED ORAL at 09:11

## 2024-10-17 RX ADMIN — HEPARIN SODIUM 5000 UNITS: 5000 INJECTION INTRAVENOUS; SUBCUTANEOUS at 13:33

## 2024-10-17 RX ADMIN — FENTANYL CITRATE 50 MCG: 50 INJECTION INTRAMUSCULAR; INTRAVENOUS at 09:09

## 2024-10-17 RX ADMIN — DICYCLOMINE HYDROCHLORIDE 20 MG: 20 TABLET ORAL at 22:48

## 2024-10-17 RX ADMIN — ONDANSETRON 4 MG: 2 INJECTION INTRAMUSCULAR; INTRAVENOUS at 03:49

## 2024-10-17 RX ADMIN — HEPARIN SODIUM 5000 UNITS: 5000 INJECTION INTRAVENOUS; SUBCUTANEOUS at 22:48

## 2024-10-17 RX ADMIN — HEPARIN SODIUM 5000 UNITS: 5000 INJECTION INTRAVENOUS; SUBCUTANEOUS at 06:36

## 2024-10-17 RX ADMIN — FENTANYL CITRATE 50 MCG: 50 INJECTION INTRAMUSCULAR; INTRAVENOUS at 03:49

## 2024-10-17 RX ADMIN — PANTOPRAZOLE SODIUM 40 MG: 40 INJECTION, POWDER, FOR SOLUTION INTRAVENOUS at 09:07

## 2024-10-17 RX ADMIN — NIFEDIPINE 90 MG: 30 TABLET, FILM COATED, EXTENDED RELEASE ORAL at 09:11

## 2024-10-17 RX ADMIN — FENTANYL CITRATE 50 MCG: 50 INJECTION INTRAMUSCULAR; INTRAVENOUS at 22:47

## 2024-10-17 RX ADMIN — BUMETANIDE 2 MG: 0.25 INJECTION INTRAMUSCULAR; INTRAVENOUS at 17:33

## 2024-10-17 RX ADMIN — POLYETHYLENE GLYCOL 3350 17 G: 17 POWDER, FOR SOLUTION ORAL at 09:10

## 2024-10-17 RX ADMIN — HYDRALAZINE HYDROCHLORIDE 10 MG: 20 INJECTION, SOLUTION INTRAMUSCULAR; INTRAVENOUS at 12:47

## 2024-10-17 RX ADMIN — BUMETANIDE 2 MG: 0.25 INJECTION INTRAMUSCULAR; INTRAVENOUS at 09:08

## 2024-10-17 RX ADMIN — OXYCODONE 5 MG: 5 TABLET ORAL at 11:26

## 2024-10-17 RX ADMIN — FENTANYL CITRATE 50 MCG: 50 INJECTION INTRAMUSCULAR; INTRAVENOUS at 13:31

## 2024-10-17 ASSESSMENT — PAIN SCALES - GENERAL
PAINLEVEL_OUTOF10: 8
PAINLEVEL_OUTOF10: 6
PAINLEVEL_OUTOF10: 7
PAINLEVEL_OUTOF10: 8

## 2024-10-17 ASSESSMENT — PAIN DESCRIPTION - PAIN TYPE
TYPE: ACUTE PAIN
TYPE: ACUTE PAIN

## 2024-10-17 ASSESSMENT — PAIN - FUNCTIONAL ASSESSMENT
PAIN_FUNCTIONAL_ASSESSMENT: PREVENTS OR INTERFERES SOME ACTIVE ACTIVITIES AND ADLS
PAIN_FUNCTIONAL_ASSESSMENT: PREVENTS OR INTERFERES SOME ACTIVE ACTIVITIES AND ADLS
PAIN_FUNCTIONAL_ASSESSMENT: ACTIVITIES ARE NOT PREVENTED
PAIN_FUNCTIONAL_ASSESSMENT: PREVENTS OR INTERFERES SOME ACTIVE ACTIVITIES AND ADLS

## 2024-10-17 ASSESSMENT — PAIN DESCRIPTION - LOCATION
LOCATION: ABDOMEN;HEAD
LOCATION: ABDOMEN
LOCATION: ABDOMEN
LOCATION: ABDOMEN;BACK
LOCATION: HEAD;BACK

## 2024-10-17 ASSESSMENT — PAIN DESCRIPTION - ORIENTATION
ORIENTATION: ANTERIOR
ORIENTATION: RIGHT;LEFT;LOWER
ORIENTATION: LEFT;RIGHT
ORIENTATION: RIGHT;LEFT;LOWER
ORIENTATION: LEFT;RIGHT

## 2024-10-17 ASSESSMENT — PAIN DESCRIPTION - DESCRIPTORS
DESCRIPTORS: ACHING
DESCRIPTORS: SQUEEZING
DESCRIPTORS: ACHING;SQUEEZING
DESCRIPTORS: SQUEEZING
DESCRIPTORS: SQUEEZING

## 2024-10-17 ASSESSMENT — PAIN DESCRIPTION - FREQUENCY
FREQUENCY: INTERMITTENT
FREQUENCY: INTERMITTENT

## 2024-10-17 ASSESSMENT — PAIN DESCRIPTION - ONSET
ONSET: ON-GOING
ONSET: ON-GOING

## 2024-10-17 NOTE — PLAN OF CARE
Problem: Discharge Planning  Goal: Discharge to home or other facility with appropriate resources  10/16/2024 2146 by Lore Morales RN  Outcome: Progressing  10/16/2024 0922 by Lesa Guevara RN  Outcome: Progressing     Problem: Pain  Goal: Verbalizes/displays adequate comfort level or baseline comfort level  10/16/2024 2146 by Lore Morales RN  Outcome: Progressing  10/16/2024 0922 by Lesa Guevara RN  Outcome: Progressing     Problem: Safety - Adult  Goal: Free from fall injury  10/16/2024 2146 by Lore Morales RN  Outcome: Progressing  10/16/2024 0922 by Lesa Guevara RN  Outcome: Progressing

## 2024-10-17 NOTE — CARE COORDINATION
Care Management Progress Note    Reason for Admission:   ARF (acute renal failure) (HCC) [N17.9]  Acute renal failure, unspecified acute renal failure type (HCC) [N17.9]  Community acquired pneumonia, unspecified laterality [J18.9]         Patient Admission Status: Inpatient  RUR:  10%  Hospitalization in the last 30 days (Readmission):  No          Pt transferred to the 5th floor.  EMR reviewed and handoff received from previous  (Nicole).      Transition Plan of Care:  Nephrology following for medical management - plan bx  Plan is for pt to return home  Outpatient follow up  Pt will arrange his own transportation home    CM will continue to follow pt from afar and will assist with any discharge needs.  Gina

## 2024-10-18 LAB
ANION GAP SERPL CALC-SCNC: 5 MMOL/L (ref 2–12)
BUN SERPL-MCNC: 28 MG/DL (ref 6–20)
BUN/CREAT SERPL: 15 (ref 12–20)
CALCIUM SERPL-MCNC: 7.4 MG/DL (ref 8.5–10.1)
CHLORIDE SERPL-SCNC: 108 MMOL/L (ref 97–108)
CO2 SERPL-SCNC: 24 MMOL/L (ref 21–32)
CREAT SERPL-MCNC: 1.87 MG/DL (ref 0.7–1.3)
ERYTHROCYTE [DISTWIDTH] IN BLOOD BY AUTOMATED COUNT: 14.8 % (ref 11.5–14.5)
GLUCOSE SERPL-MCNC: 138 MG/DL (ref 65–100)
HCT VFR BLD AUTO: 37.3 % (ref 36.6–50.3)
HGB BLD-MCNC: 12.8 G/DL (ref 12.1–17)
MCH RBC QN AUTO: 30 PG (ref 26–34)
MCHC RBC AUTO-ENTMCNC: 34.3 G/DL (ref 30–36.5)
MCV RBC AUTO: 87.4 FL (ref 80–99)
NRBC # BLD: 0 K/UL (ref 0–0.01)
NRBC BLD-RTO: 0 PER 100 WBC
PLATELET # BLD AUTO: 234 K/UL (ref 150–400)
PMV BLD AUTO: 10.2 FL (ref 8.9–12.9)
POTASSIUM SERPL-SCNC: 3.2 MMOL/L (ref 3.5–5.1)
PSA SERPL-MCNC: 0.6 NG/ML (ref 0.01–4)
RBC # BLD AUTO: 4.27 M/UL (ref 4.1–5.7)
SODIUM SERPL-SCNC: 137 MMOL/L (ref 136–145)
WBC # BLD AUTO: 6.4 K/UL (ref 4.1–11.1)

## 2024-10-18 PROCEDURE — 6370000000 HC RX 637 (ALT 250 FOR IP): Performed by: INTERNAL MEDICINE

## 2024-10-18 PROCEDURE — 36415 COLL VENOUS BLD VENIPUNCTURE: CPT

## 2024-10-18 PROCEDURE — 6360000002 HC RX W HCPCS: Performed by: STUDENT IN AN ORGANIZED HEALTH CARE EDUCATION/TRAINING PROGRAM

## 2024-10-18 PROCEDURE — 82787 IGG 1 2 3 OR 4 EACH: CPT

## 2024-10-18 PROCEDURE — 6370000000 HC RX 637 (ALT 250 FOR IP): Performed by: STUDENT IN AN ORGANIZED HEALTH CARE EDUCATION/TRAINING PROGRAM

## 2024-10-18 PROCEDURE — 6370000000 HC RX 637 (ALT 250 FOR IP)

## 2024-10-18 PROCEDURE — 1100000000 HC RM PRIVATE

## 2024-10-18 PROCEDURE — 6360000002 HC RX W HCPCS: Performed by: INTERNAL MEDICINE

## 2024-10-18 PROCEDURE — 2580000003 HC RX 258: Performed by: STUDENT IN AN ORGANIZED HEALTH CARE EDUCATION/TRAINING PROGRAM

## 2024-10-18 PROCEDURE — G0103 PSA SCREENING: HCPCS

## 2024-10-18 PROCEDURE — 82784 ASSAY IGA/IGD/IGG/IGM EACH: CPT

## 2024-10-18 PROCEDURE — 2580000003 HC RX 258: Performed by: INTERNAL MEDICINE

## 2024-10-18 PROCEDURE — 85027 COMPLETE CBC AUTOMATED: CPT

## 2024-10-18 PROCEDURE — 80048 BASIC METABOLIC PNL TOTAL CA: CPT

## 2024-10-18 RX ORDER — BENZOCAINE/MENTHOL 6 MG-10 MG
LOZENGE MUCOUS MEMBRANE 2 TIMES DAILY
Status: DISCONTINUED | OUTPATIENT
Start: 2024-10-18 | End: 2024-10-20 | Stop reason: HOSPADM

## 2024-10-18 RX ORDER — ATORVASTATIN CALCIUM 20 MG/1
20 TABLET, FILM COATED ORAL NIGHTLY
Status: DISCONTINUED | OUTPATIENT
Start: 2024-10-18 | End: 2024-10-20 | Stop reason: HOSPADM

## 2024-10-18 RX ORDER — POTASSIUM CHLORIDE 750 MG/1
40 TABLET, EXTENDED RELEASE ORAL ONCE
Status: COMPLETED | OUTPATIENT
Start: 2024-10-18 | End: 2024-10-18

## 2024-10-18 RX ORDER — SULFAMETHOXAZOLE AND TRIMETHOPRIM 800; 160 MG/1; MG/1
1 TABLET ORAL
Status: DISCONTINUED | OUTPATIENT
Start: 2024-10-18 | End: 2024-10-20 | Stop reason: HOSPADM

## 2024-10-18 RX ORDER — PANTOPRAZOLE SODIUM 40 MG/1
40 TABLET, DELAYED RELEASE ORAL
Status: DISCONTINUED | OUTPATIENT
Start: 2024-10-19 | End: 2024-10-20 | Stop reason: HOSPADM

## 2024-10-18 RX ADMIN — BUMETANIDE 2 MG: 0.25 INJECTION INTRAMUSCULAR; INTRAVENOUS at 16:11

## 2024-10-18 RX ADMIN — BUMETANIDE 2 MG: 0.25 INJECTION INTRAMUSCULAR; INTRAVENOUS at 09:16

## 2024-10-18 RX ADMIN — HEPARIN SODIUM 5000 UNITS: 5000 INJECTION INTRAVENOUS; SUBCUTANEOUS at 06:43

## 2024-10-18 RX ADMIN — SULFAMETHOXAZOLE AND TRIMETHOPRIM 1 TABLET: 800; 160 TABLET ORAL at 14:29

## 2024-10-18 RX ADMIN — POTASSIUM CHLORIDE 40 MEQ: 750 TABLET, EXTENDED RELEASE ORAL at 06:42

## 2024-10-18 RX ADMIN — OXYCODONE 5 MG: 5 TABLET ORAL at 20:39

## 2024-10-18 RX ADMIN — OXYCODONE 5 MG: 5 TABLET ORAL at 16:10

## 2024-10-18 RX ADMIN — HYDROCORTISONE: 1 CREAM TOPICAL at 20:39

## 2024-10-18 RX ADMIN — NIFEDIPINE 90 MG: 30 TABLET, FILM COATED, EXTENDED RELEASE ORAL at 09:13

## 2024-10-18 RX ADMIN — APIXABAN 2.5 MG: 2.5 TABLET, FILM COATED ORAL at 14:29

## 2024-10-18 RX ADMIN — ATORVASTATIN CALCIUM 20 MG: 20 TABLET, FILM COATED ORAL at 20:38

## 2024-10-18 RX ADMIN — APIXABAN 2.5 MG: 2.5 TABLET, FILM COATED ORAL at 20:39

## 2024-10-18 RX ADMIN — METHYLPREDNISOLONE SODIUM SUCCINATE 1000 MG: 1 INJECTION INTRAMUSCULAR; INTRAVENOUS at 14:40

## 2024-10-18 RX ADMIN — LOSARTAN POTASSIUM 100 MG: 50 TABLET, FILM COATED ORAL at 09:14

## 2024-10-18 RX ADMIN — OXYCODONE 5 MG: 5 TABLET ORAL at 06:50

## 2024-10-18 RX ADMIN — OXYCODONE 5 MG: 5 TABLET ORAL at 11:24

## 2024-10-18 RX ADMIN — PANTOPRAZOLE SODIUM 40 MG: 40 INJECTION, POWDER, FOR SOLUTION INTRAVENOUS at 09:16

## 2024-10-18 ASSESSMENT — PAIN SCALES - GENERAL
PAINLEVEL_OUTOF10: 6
PAINLEVEL_OUTOF10: 5
PAINLEVEL_OUTOF10: 9
PAINLEVEL_OUTOF10: 3
PAINLEVEL_OUTOF10: 8
PAINLEVEL_OUTOF10: 6

## 2024-10-18 ASSESSMENT — PAIN DESCRIPTION - LOCATION
LOCATION: BACK
LOCATION: ABDOMEN;BACK
LOCATION: BACK;ABDOMEN

## 2024-10-18 ASSESSMENT — PAIN DESCRIPTION - ORIENTATION
ORIENTATION: LOWER
ORIENTATION: LOWER

## 2024-10-18 ASSESSMENT — PAIN DESCRIPTION - DESCRIPTORS
DESCRIPTORS: ACHING
DESCRIPTORS: ACHING

## 2024-10-18 NOTE — CARE COORDINATION
Care Management Progress Note    Reason for Admission:   ARF (acute renal failure) (HCC) [N17.9]  Acute renal failure, unspecified acute renal failure type (HCC) [N17.9]  Community acquired pneumonia, unspecified laterality [J18.9]    Patient Admission Status: Inpatient  RUR: 10%  Hospitalization in the last 30 days (Readmission):  No        Transition of care plan:  Clinical Updates:  ARF and LLL PNA.  Continue IV abx.  Nephro following.    Discharge Plan:    No CM needs identified.    Date 1st IMM letter given: 10/18/2024    Outpatient follow-up:  TBD    Transport at discharge: Family    ______________________________________  SYLVIA Pete, RN-CM  [facility]- Care Management  Available via [employer]  10/18/2024  4:40 PM

## 2024-10-19 ENCOUNTER — APPOINTMENT (OUTPATIENT)
Facility: HOSPITAL | Age: 64
DRG: 698 | End: 2024-10-19
Payer: MEDICARE

## 2024-10-19 LAB
ANION GAP SERPL CALC-SCNC: 6 MMOL/L (ref 2–12)
APPEARANCE UR: CLEAR
BACTERIA URNS QL MICRO: NEGATIVE /HPF
BILIRUB UR QL: NEGATIVE
BUN SERPL-MCNC: 33 MG/DL (ref 6–20)
BUN/CREAT SERPL: 17 (ref 12–20)
CALCIUM SERPL-MCNC: 7.7 MG/DL (ref 8.5–10.1)
CHLORIDE SERPL-SCNC: 109 MMOL/L (ref 97–108)
CO2 SERPL-SCNC: 23 MMOL/L (ref 21–32)
COLOR UR: ABNORMAL
CREAT SERPL-MCNC: 1.98 MG/DL (ref 0.7–1.3)
EPITH CASTS URNS QL MICRO: ABNORMAL /LPF
ERYTHROCYTE [DISTWIDTH] IN BLOOD BY AUTOMATED COUNT: 14.3 % (ref 11.5–14.5)
GLUCOSE SERPL-MCNC: 140 MG/DL (ref 65–100)
GLUCOSE UR STRIP.AUTO-MCNC: 500 MG/DL
HCT VFR BLD AUTO: 39.3 % (ref 36.6–50.3)
HGB BLD-MCNC: 13.5 G/DL (ref 12.1–17)
HGB UR QL STRIP: ABNORMAL
HYALINE CASTS URNS QL MICRO: ABNORMAL /LPF (ref 0–5)
KETONES UR QL STRIP.AUTO: NEGATIVE MG/DL
LEUKOCYTE ESTERASE UR QL STRIP.AUTO: NEGATIVE
MCH RBC QN AUTO: 29.9 PG (ref 26–34)
MCHC RBC AUTO-ENTMCNC: 34.4 G/DL (ref 30–36.5)
MCV RBC AUTO: 86.9 FL (ref 80–99)
NITRITE UR QL STRIP.AUTO: NEGATIVE
NRBC # BLD: 0 K/UL (ref 0–0.01)
NRBC BLD-RTO: 0 PER 100 WBC
PH UR STRIP: 5.5 (ref 5–8)
PLATELET # BLD AUTO: 258 K/UL (ref 150–400)
PMV BLD AUTO: 10.2 FL (ref 8.9–12.9)
POTASSIUM SERPL-SCNC: 4.1 MMOL/L (ref 3.5–5.1)
PROT UR STRIP-MCNC: 300 MG/DL
RBC # BLD AUTO: 4.52 M/UL (ref 4.1–5.7)
RBC #/AREA URNS HPF: ABNORMAL /HPF (ref 0–5)
SODIUM SERPL-SCNC: 138 MMOL/L (ref 136–145)
SP GR UR REFRACTOMETRY: 1.01 (ref 1–1.03)
UROBILINOGEN UR QL STRIP.AUTO: 0.2 EU/DL (ref 0.2–1)
WBC # BLD AUTO: 3.7 K/UL (ref 4.1–11.1)
WBC URNS QL MICRO: ABNORMAL /HPF (ref 0–4)

## 2024-10-19 PROCEDURE — 6370000000 HC RX 637 (ALT 250 FOR IP)

## 2024-10-19 PROCEDURE — 6370000000 HC RX 637 (ALT 250 FOR IP): Performed by: STUDENT IN AN ORGANIZED HEALTH CARE EDUCATION/TRAINING PROGRAM

## 2024-10-19 PROCEDURE — 74018 RADEX ABDOMEN 1 VIEW: CPT

## 2024-10-19 PROCEDURE — 80048 BASIC METABOLIC PNL TOTAL CA: CPT

## 2024-10-19 PROCEDURE — 81001 URINALYSIS AUTO W/SCOPE: CPT

## 2024-10-19 PROCEDURE — 6360000002 HC RX W HCPCS

## 2024-10-19 PROCEDURE — 6370000000 HC RX 637 (ALT 250 FOR IP): Performed by: NURSE PRACTITIONER

## 2024-10-19 PROCEDURE — 6370000000 HC RX 637 (ALT 250 FOR IP): Performed by: INTERNAL MEDICINE

## 2024-10-19 PROCEDURE — 6360000002 HC RX W HCPCS: Performed by: INTERNAL MEDICINE

## 2024-10-19 PROCEDURE — 2580000003 HC RX 258: Performed by: INTERNAL MEDICINE

## 2024-10-19 PROCEDURE — 36415 COLL VENOUS BLD VENIPUNCTURE: CPT

## 2024-10-19 PROCEDURE — 1100000000 HC RM PRIVATE

## 2024-10-19 PROCEDURE — 85027 COMPLETE CBC AUTOMATED: CPT

## 2024-10-19 RX ORDER — DICYCLOMINE HCL 20 MG
20 TABLET ORAL
Status: DISCONTINUED | OUTPATIENT
Start: 2024-10-19 | End: 2024-10-19

## 2024-10-19 RX ORDER — DICYCLOMINE HCL 20 MG
20 TABLET ORAL ONCE
Status: COMPLETED | OUTPATIENT
Start: 2024-10-19 | End: 2024-10-19

## 2024-10-19 RX ORDER — DICYCLOMINE HCL 20 MG
20 TABLET ORAL
Status: DISCONTINUED | OUTPATIENT
Start: 2024-10-19 | End: 2024-10-20 | Stop reason: HOSPADM

## 2024-10-19 RX ORDER — MORPHINE SULFATE 2 MG/ML
1 INJECTION, SOLUTION INTRAMUSCULAR; INTRAVENOUS ONCE
Status: COMPLETED | OUTPATIENT
Start: 2024-10-19 | End: 2024-10-19

## 2024-10-19 RX ADMIN — OXYCODONE 5 MG: 5 TABLET ORAL at 19:43

## 2024-10-19 RX ADMIN — OXYCODONE 5 MG: 5 TABLET ORAL at 04:13

## 2024-10-19 RX ADMIN — APIXABAN 2.5 MG: 2.5 TABLET, FILM COATED ORAL at 09:08

## 2024-10-19 RX ADMIN — PANTOPRAZOLE SODIUM 40 MG: 40 TABLET, DELAYED RELEASE ORAL at 09:10

## 2024-10-19 RX ADMIN — SENNOSIDES AND DOCUSATE SODIUM 2 TABLET: 50; 8.6 TABLET ORAL at 09:08

## 2024-10-19 RX ADMIN — DICYCLOMINE HYDROCHLORIDE 20 MG: 20 TABLET ORAL at 02:06

## 2024-10-19 RX ADMIN — DICYCLOMINE HYDROCHLORIDE 20 MG: 20 TABLET ORAL at 16:28

## 2024-10-19 RX ADMIN — BISACODYL 10 MG: 10 SUPPOSITORY RECTAL at 19:43

## 2024-10-19 RX ADMIN — LOSARTAN POTASSIUM 100 MG: 50 TABLET, FILM COATED ORAL at 09:08

## 2024-10-19 RX ADMIN — BUMETANIDE 2 MG: 0.25 INJECTION INTRAMUSCULAR; INTRAVENOUS at 16:28

## 2024-10-19 RX ADMIN — DICYCLOMINE HYDROCHLORIDE 20 MG: 20 TABLET ORAL at 21:46

## 2024-10-19 RX ADMIN — NIFEDIPINE 90 MG: 30 TABLET, FILM COATED, EXTENDED RELEASE ORAL at 09:08

## 2024-10-19 RX ADMIN — POLYETHYLENE GLYCOL 3350 17 G: 17 POWDER, FOR SOLUTION ORAL at 09:08

## 2024-10-19 RX ADMIN — MORPHINE SULFATE 1 MG: 2 INJECTION, SOLUTION INTRAMUSCULAR; INTRAVENOUS at 23:03

## 2024-10-19 RX ADMIN — APIXABAN 2.5 MG: 2.5 TABLET, FILM COATED ORAL at 21:46

## 2024-10-19 RX ADMIN — HYDROCORTISONE: 1 CREAM TOPICAL at 09:09

## 2024-10-19 RX ADMIN — ATORVASTATIN CALCIUM 20 MG: 20 TABLET, FILM COATED ORAL at 19:43

## 2024-10-19 RX ADMIN — HYDROCORTISONE: 1 CREAM TOPICAL at 21:47

## 2024-10-19 RX ADMIN — BUMETANIDE 2 MG: 0.25 INJECTION INTRAMUSCULAR; INTRAVENOUS at 09:07

## 2024-10-19 RX ADMIN — METHYLPREDNISOLONE SODIUM SUCCINATE 1000 MG: 1 INJECTION INTRAMUSCULAR; INTRAVENOUS at 09:11

## 2024-10-19 ASSESSMENT — PAIN DESCRIPTION - ORIENTATION: ORIENTATION: MID

## 2024-10-19 ASSESSMENT — PAIN DESCRIPTION - LOCATION
LOCATION: ABDOMEN
LOCATION: BACK

## 2024-10-19 ASSESSMENT — PAIN SCALES - GENERAL
PAINLEVEL_OUTOF10: 8
PAINLEVEL_OUTOF10: 8
PAINLEVEL_OUTOF10: 7
PAINLEVEL_OUTOF10: 7

## 2024-10-19 ASSESSMENT — PAIN DESCRIPTION - DESCRIPTORS: DESCRIPTORS: ACHING

## 2024-10-19 NOTE — PROGRESS NOTES
[facility]    Zoltan Fontana Jr  YOB: 1960          Assessment & Plan:     Nephrotic syndrome with DEBRA. BX: NELL1 positive MN with AIN  HTN  Pancreatitis    Rec:  He had colonoscopy last year and no evident malignancy on imaging here.  Will check PSA but he thinks it was recently normal  Given LOW LIKELIHOOD of secondary MN and severity of his disease (PCR over 13, Alb 1.0, elevated Cr), would favor treatment now over observation  Due to severity of disease and PLA2R negative status, favor Ponticelli regimen (6 mo alternating steroids and CYC) over RTX    Start with solumedrol 1 g daily for 3 days over weekend  Discharge home on 80 mg Pred daily and I will see him in the office next week  Bactrim DS 1 po MWF for prophylaxis  Eliquis 2.5 mg BID for prophylaxis (due to MN with severe hypoalbuminemia)  Atorvastatin 20 mg daily  Continue ARB and diuretic  My office will contact him to arrange follow up.       Subjective:   CC: DEBRA, nephrotic syndrome  HPI: Labs stable. Bx results as above.    Current Facility-Administered Medications   Medication Dose Route Frequency    hydrocortisone 1 % cream   Topical BID    methylPREDNISolone sodium (SOLU-MEDROL) 1,000 mg in sodium chloride 0.9 % 250 mL IVPB ([id number])  1,000 mg IntraVENous Daily    sulfamethoxazole-trimethoprim (BACTRIM DS;SEPTRA DS) 800-160 MG per tablet 1 tablet  1 tablet Oral Once per day on Monday Wednesday Friday    apixaban (ELIQUIS) tablet 2.5 mg  2.5 mg Oral BID    atorvastatin (LIPITOR) tablet 20 mg  20 mg Oral Nightly    ondansetron (ZOFRAN) injection 4 mg  4 mg IntraVENous Q6H PRN    bisacodyl (DULCOLAX) suppository 10 mg  10 mg Rectal Daily PRN    mylanta/viscous lidocaine (GI COCKTAIL)  40 mL Oral BID PRN    sennosides-docusate sodium (SENOKOT-S) 8.6-50 MG tablet 2 tablet  2 tablet Oral Daily    oxyCODONE (ROXICODONE) immediate release tablet 5 mg  5 mg Oral Q4H PRN    lidocaine 4 % 
                            CAMERON LEAHY [facility]    Zoltan Fontana Jr  YOB: 1960          Assessment & Plan:     Nephrotic syndrome with DEBRA. Seros negative. ?WENDY with ATN ?due to NSAIDs  HTN  Pancreatitis    Rec:  Biopsy yesterday. Results pending.  Continue Bumex and ARB  Consider steroids after bx result available       Subjective:   CC: DEBRA, nephrotic syndrome  HPI: Creat stable. Awaiting bx results    Current Facility-Administered Medications   Medication Dose Route Frequency    ondansetron (ZOFRAN) injection 4 mg  4 mg IntraVENous Q6H PRN    mylanta/viscous lidocaine (GI COCKTAIL)  40 mL Oral BID PRN    sennosides-docusate sodium (SENOKOT-S) 8.6-50 MG tablet 2 tablet  2 tablet Oral Daily    fentaNYL (SUBLIMAZE) injection 50 mcg  50 mcg IntraVENous Q4H PRN    oxyCODONE (ROXICODONE) immediate release tablet 5 mg  5 mg Oral Q4H PRN    lidocaine 4 % external patch 1 patch  1 patch TransDERmal Daily    NIFEdipine (PROCARDIA XL) extended release tablet 90 mg  90 mg Oral Daily    ipratropium 0.5 mg-albuterol 2.5 mg (DUONEB) nebulizer solution 1 Dose  1 Dose Inhalation Q6H PRN    polyethylene glycol (GLYCOLAX) packet 17 g  17 g Oral Daily    hydrALAZINE (APRESOLINE) injection 10 mg  10 mg IntraVENous Q4H PRN    labetalol (NORMODYNE;TRANDATE) injection 10 mg  10 mg IntraVENous Q6H PRN    pantoprazole (PROTONIX) 40 mg in sodium chloride (PF) 0.9 % 10 mL injection  40 mg IntraVENous Daily    bumetanide (BUMEX) injection 2 mg  2 mg IntraVENous BID    losartan (COZAAR) tablet 100 mg  100 mg Oral Daily    heparin (porcine) injection 5,000 Units  5,000 Units SubCUTAneous 3 times per day    prochlorperazine (COMPAZINE) injection 5 mg  5 mg IntraVENous Q6H PRN    tiZANidine (ZANAFLEX) tablet 4 mg  4 mg Oral Q8H PRN          Objective:     Vitals:  Blood pressure (!) 157/84, pulse 80, temperature 97.5 °F (36.4 °C), temperature source Axillary, resp. rate 17, height 1.753 m (5' 9\"), weight 90.3 kg 
    Hospitalist Progress Note      NAME:  Zoltan Fontana Jr   :  1960  MRM:  [id number]    Date/Time: 10/16/2024  11:29 AM           Assessment / Plan:     Patient is a 64-year-old male with history of chronic back pain who was recently diagnosed with streptococcal infection last month and he also developed allergic reaction to Augmentin comes to the hospital with acute renal failure and left lower lobe pneumonia.  Patient admitted for IV antibiotics, nephrology consult.     Acute renal failure  Anasarca  Hypoalbuminemia  NAGMA  Suspect nephrotic syndrome  Nephrology following, recommended CT-guided biopsy  S/p biopsy 10/15/2024  plan.  Continue Bumex, and losartan.  IV albumin as needed  Continue sodium bicarb tablet  Avoid nephrotoxins.  Monitor kidney function closely  Consider starting on IV steroids depending biopsy results     Hypertensive emergency  S/p Cardene drip.  Transitioned to p.o. antihypertensive.  Continue losartan 100 mg daily.  Procardia was added which is increased to 90 mg daily.  Patient also on Bumex 2 g IV twice daily.  Appreciate nephrology recommendations    Acute pancreatitis, 10/12/24  Abdominal pain  Patient report lower abdominal pain and mildly tender generalized  CT abdomen and pelvis without contrast obtained, findings consistent with pancreatitis with associated gallbladder distention  Ultrasound right upper quadrant showed no signs of acute cholecystitis  LFTs within normal limits  Triglyceride 300  Plan  Secondary to persistent abdominal pain, will repeat CT today  Conservative management     Thrombocytopenia  Platelet count stable  Monitor platelets closely.     Left lower lobe pneumonia  CT findings could be related to fluid from renal failure.  Procalcitonin negative.  He is afebrile and normal white count.  Discontinued antibiotics and monitor clinically     Bilateral pleural effusions  Due to renal failure.  IV Lasix.    Hypocalcemia  Corrected calcium is about 
    Hospitalist Progress Note      NAME:  Zoltan Fontana Jr   :  1960  MRM:  [id number]    Date/Time: 10/12/2024  12:05 PM           Assessment / Plan:     Patient is a 64-year-old male with history of chronic back pain who was recently diagnosed with streptococcal infection last month and he also developed allergic reaction to Augmentin comes to the hospital with acute renal failure and left lower lobe pneumonia.  Patient admitted for IV antibiotics, nephrology consult.     Acute renal failure  Probably nephrotic syndrome  UA showing > 300 protein, moderate blood  Nephrology consulted, planning for CT-guided renal biopsy on Monday.  Continue Bumex, can resume ACE inhibitor.  IV albumin  Keep n.p.o.  night for biopsy  Continue sodium bicarb tablet  Avoid nephrotoxins.  Monitor kidney function closely    Acute pancreatitis  Patient report lower abdominal pain and mildly tender generalized  CT abdomen and pelvis without contrast obtained, findings consistent with pancreatitis with associated gallbladder distention  Get ultrasound right upper quadrant  No IV fluids as patient already fluid overloaded due to renal failure  LFTs within normal limits  Triglyceride 300  Conservative management     Thrombocytopenia  Platelet count stable  Monitor platelets closely.     Left lower lobe pneumonia  CT findings could be related to fluid from renal failure.  Procalcitonin negative.  He is afebrile and normal white count.  Discontinue antibiotics and monitor clinically     Bilateral pleural effusions  Due to renal failure.  IV Lasix.     Hypertensive emergency  Weaned from Cardene drip.  Transitioned to p.o. antihypertensive.  Continue losartan 100 mg daily.  Procardia 30 mg daily was added.  He remains hypertensive we will increase the Procardia to 60 mg daily.   Monitor blood pressure closely.    Hyperlipidemia  , triglycerides 300  Noted allergies to started in the chart, will review with the patient regarding 
    Hospitalist Progress Note      NAME:  Zoltan Fontana Jr   :  1960  MRM:  [id number]    Date/Time: 10/17/2024  11:54 AM           Assessment / Plan:     Patient is a 64-year-old male with history of chronic back pain who was recently diagnosed with streptococcal infection last month and he also developed allergic reaction to Augmentin comes to the hospital with acute renal failure and left lower lobe pneumonia.  Patient admitted for IV antibiotics, nephrology consult.     Acute renal failure  Anasarca  Hypoalbuminemia  NAGMA  Suspect nephrotic syndrome  Nephrology following, recommended CT-guided biopsy  S/p biopsy   plan.  Continue Bumex, and losartan.  IV albumin as needed  Continue sodium bicarb tablet  Avoid nephrotoxins.  Monitor kidney function closely  Consider starting on IV steroids depending biopsy results   Nephrology following     Hypertensive emergency  S/p Cardene drip.  Transitioned to p.o. antihypertensive.  Continue losartan 100 mg daily.  Procardia was added which is increased to 90 mg daily.  Patient also on Bumex 2 g IV twice daily.  Appreciate nephrology recommendations    Acute pancreatitis, 10/12/24  Abdominal pain  Patient report lower abdominal pain and mildly tender generalized  CT abdomen and pelvis without contrast obtained, findings consistent with pancreatitis with associated gallbladder distention  Ultrasound right upper quadrant showed no signs of acute cholecystitis  LFTs within normal limits  Triglyceride 300  Repeated CT abd/pelvius 10/16/24:  Significant interval improvement in pancreatitis, with minimal residual  peripancreatic inflammation.  Plan  Advance diet  Conservative management     Thrombocytopenia  Platelet count stable, improved        Left lower lobe pneumonia  CT findings could be related to fluid from renal failure.  Procalcitonin negative.  He is afebrile and normal white count.  Discontinued antibiotics and monitor clinically     Bilateral pleural 
    Hospitalist Progress Note      NAME:  Zoltan Fontana Jr   :  1960  MRM:  [id number]    Date/Time: 10/19/2024  10:52 AM           Assessment / Plan:     Patient is a 64-year-old male with history of chronic back pain who was recently diagnosed with streptococcal infection last month and he also developed allergic reaction to Augmentin comes to the hospital with acute renal failure and left lower lobe pneumonia.  Patient admitted for IV antibiotics, nephrology consult.     Membranous glomerulopathy   Acute renal failure  Anasarca  Hypoalbuminemia, proteinuria  NAGMA  Kidney, biopsy:        Membranous glomerulopathy   Nephrology on board  plan.  Continue solumedrol 1 g daily for 3 days over weekend, with plan to DC on Monday  Discharge home on 80 mg Pred daily, patient has had received a call already from the clinic and scheduled appointment for follow-up  Bactrim DS 1 po MWF for prophylaxis  Eliquis 2.5 mg BID for prophylaxis (due to MN with severe hypoalbuminemia)  Atorvastatin 20 mg daily  Continue ARB and diuretic    Hypertensive emergency  S/p Cardene drip.  Transitioned to p.o. antihypertensive.    Continue losartan 100 mg daily.  Procardia was added which is increased to 90 mg daily.  Patient also on Bumex 2 g IV twice daily.    Acute pancreatitis, 10/12/24  Abdominal pain  CT abdomen and pelvis without contrast obtained, findings consistent with pancreatitis with associated gallbladder distention  Ultrasound right upper quadrant showed no signs of acute cholecystitis  LFTs within normal limits  Triglyceride 300  Repeated CT abd/pelvius 10/16/24:  Significant interval improvement in pancreatitis, with minimal residual peripancreatic inflammation.  10/19 patient continues to report persistence of abdominal pain, and has been complaining of burning with urination UA was ordered that was unremarkable except for presence of proteinuria which is expected with Membranous glomerulopathy   Plan  Continue diet as 
    Hospitalist Progress Note      NAME:  Zoltan Fontana Jr   :  1960  MRM:  [id number]    Date/Time: 10/14/2024  1:20 PM           Assessment / Plan:     Patient is a 64-year-old male with history of chronic back pain who was recently diagnosed with streptococcal infection last month and he also developed allergic reaction to Augmentin comes to the hospital with acute renal failure and left lower lobe pneumonia.  Patient admitted for IV antibiotics, nephrology consult.     Acute renal failure  Anasarca  Hypoalbuminemia  NAGMA  Suspect nephrotic syndrome  Nephrology following, recommended CT-guided biopsy  Was scheduled for biopsy today with IR however patient received subcutaneous heparin this morning.  Will be rescheduled for tomorrow. Will hold the evening and morning heparin dose,  Keep n.p.o. after midnight.  Continue Bumex, and losartan.  IV albumin as needed  Continue sodium bicarb tablet  Avoid nephrotoxins.  Monitor kidney function closely    Hypertensive emergency  S/p Cardene drip.  Transitioned to p.o. antihypertensive.  Continue losartan 100 mg daily.  Procardia 30 mg daily was added, which is increased to 60 mg daily.  Patient also on Bumex 2 g IV twice daily.  Blood pressure still uncontrolled, appreciate nephrology recommendations.    Acute pancreatitis, 10/12/24  Patient report lower abdominal pain and mildly tender generalized  CT abdomen and pelvis without contrast obtained, findings consistent with pancreatitis with associated gallbladder distention  Ultrasound right upper quadrant showed no signs of acute cholecystitis  Pain much better.  Diet advanced to regular  No IV fluids as patient already fluid overloaded due to renal failure  LFTs within normal limits  Triglyceride 300  Conservative management     Thrombocytopenia  Platelet count stable  Monitor platelets closely.     Left lower lobe pneumonia  CT findings could be related to fluid from renal failure.  Procalcitonin negative.  He is 
0150 - Patient states increase in abdominal pain, not new pain, just worse. Abdominal slightly firm and tender to touch. Says does not feel like constipation pain that he had earlier in the week. Does not want pain meds at this time. Notified NP.  
0700 Bedside and Verbal shift change report given to April RN (oncoming nurse) by Мария COOK (offgoing nurse). Report included the following information Nurse Handoff Report, Recent Results, Cardiac Rhythm NSR, Neuro Assessment, and Event Log.     1815 TRANSFER - OUT REPORT:    Verbal report given to Adonis RN on Zoltan Fontana Jr  being transferred to [facility] for routine progression of patient care       Report consisted of patient's Situation, Background, Assessment and   Recommendations(SBAR).     Information from the following report(s) Nurse Handoff Report, Recent Results, Cardiac Rhythm NSR, Neuro Assessment, and Event Log was reviewed with the receiving nurse.           Lines:   Peripheral IV 10/13/24 Left;Anterior Forearm (Active)   Site Assessment Clean, dry & intact 10/16/24 1549   Line Status Flushed;Capped;Normal saline locked 10/16/24 1549   Line Care Cap changed;Connections checked and tightened 10/16/24 1549   Phlebitis Assessment No symptoms 10/16/24 1549   Infiltration Assessment 0 10/16/24 1549   Alcohol Cap Used Yes 10/16/24 1549   Dressing Status Clean, dry & intact 10/16/24 1549   Dressing Type Transparent 10/16/24 1549   Dressing Intervention New 10/14/24 0400        Opportunity for questions and clarification was provided.      Patient transported with:  Monitor and Tech        
0800  Spoke to JOYCE Garcia concerning Renal biopsy ordered for today. Patient received Heparin SQ at 0500 so will need for this to be on 12 hour hold prior to biopsy. RN to notify ordering MD Gordon and make NPO tonight  and hold Heparin.  
11:00 AM Patient ID verified x 2. [facility] with transport via stretcher to radiology nurse holding department for CT guided renal biopsy with conscious sedation by Abi Mejia PA-C. NPO status confirmed. S.O. contact information recorded and post sedation /recovery discharge plan discussed. Assessment and pre-procedural assessment completed. Allergies and medications reviewed. Patient is alert and oriented x4. Rates his back pain currently 7-8/10, uncontrolled and blood pressure elevated. Consent reviewed for correctness. Blood pressure elevated, plan of care discussed with provider for pain and blood pressure management. Provider called to bedside.    11:05 AM Procedure, sedation and medication plan /education discussed with patient and provider, approved and ordered per sedation protocol with pain management pre-procedure phase for blood pressure control. Patient voices good understanding of all. Consent signed.  
1250 PM Tranported back to radiology recovery via stretcher, laying flat / supine, biopsy site left posterior lower back is CDI, no hematoma or bleeding. VS stable. Denies any nausea or pain post procedure. Resting quietly.    14:00 PM VS Q 15\" WNL. Post procedural recovery uneventful. Has not yet voided. Pain and blood pressure remain controlled. Pain level is 0/10 at time of transport out of recovery. Dressing is CDI to left lower back. Post procedural report via perfect serve to Sofia COOK.   
12:10 PM To CT exam room via stretcher. Patient introduced to CT staff prior to assisting patient onto CT exam table into a prone position of comfort. 02 applied @ 3L via ETC02 nasal cannula, the cardiac monitor, respiratory and Sp02 monitoring applied and in use.     A sedation timeout performed with team members to include patient allergies at this time.     Sedation started @ 12:15 PM    The procedural timeout completed at 12:33 PM to include allergies performed with physician present and procedure started.    Sedation ended @ 12:45 PM. Medication totals administered in incremental doses: Versed 5 mg and Fentanyl 100 mcg. (Fentanyl 75 mcg given intraprocedure and 25 mcg given preprocedure) Tolerated sedation well. Total sedation time was 30 minutes.     Gelfoam was instilled by provider prior to removal of biopsy needle. Procedure ended @ 12:50 PM post scan in progress after Dressing applied of 2 x 2 gauze and transparent film to left posterior lower back area.  No bleeding or hematoma noted.Tolerated procedure well.     Post procedural orders received if applicable as follows: Routine sedation recovery monitoring and bedrest post procedure x 6 hours. May be up and ambulatory after 6:45 PM. VS Q15 x4 and Q 30 x 2 then hourly. Call renal for blood pressure management. May resume PO fluids. Report any flank pain or hematuria to IR physician.     Transfer off CT table with assistance to stretcher and transported back to radiology holding for recovery monitoring post procedure VS and procedural site checks per protocol.  IV fluids and 02 discontinued.                  
2026  Patient states pain is starting to come back.  Does not have anything for break through pain and it is too early for prn.  Discussed with Farideh NP.    2234  Patient states abdomen is tender.  Has not have a bowel movement in 5 days.  Discussed with Farideh NP.  New order received.  Patient wants to wait til the morning for the enema.      0700  Bedside and Verbal shift change report given to Ryan (oncoming nurse) by Мария (offgoing nurse). Report included the following information Nurse Handoff Report.     
Came to see the patient, he is off the floor, noticed improvement in creatinine, continue Bumex, check labs daily  
Caridad BARNES [facility]    Zoltan Fontana Jr  YOB: 1960          Assessment & Plan:     DEBRA(suspect secondary nephrotic syndrome because of NSAID use)  Hypertensive urgency  NAGMA  Anasarca  Hypoalbuminemia        PLAN-  Bx in am given it was cancelled  Clinically improved from an edema standpoint  Watching bp       Subjective:   CC: DEBRA and nephrotic range proteinuria   HPI: bx cancelled b/c rec hep overnight??  ROS: edema is less  Current Facility-Administered Medications   Medication Dose Route Frequency    lidocaine 4 % external patch 1 patch  1 patch TransDERmal Daily    ipratropium 0.5 mg-albuterol 2.5 mg (DUONEB) nebulizer solution 1 Dose  1 Dose Inhalation Q6H PRN    polyethylene glycol (GLYCOLAX) packet 17 g  17 g Oral Daily    sennosides-docusate sodium (SENOKOT-S) 8.6-50 MG tablet 2 tablet  2 tablet Oral Daily PRN    HYDROmorphone HCl PF (DILAUDID) injection 0.25 mg  0.25 mg IntraVENous Q4H PRN    hydrALAZINE (APRESOLINE) injection 10 mg  10 mg IntraVENous Q4H PRN    labetalol (NORMODYNE;TRANDATE) injection 10 mg  10 mg IntraVENous Q6H PRN    NIFEdipine (PROCARDIA XL) extended release tablet 60 mg  60 mg Oral Daily    pantoprazole (PROTONIX) 40 mg in sodium chloride (PF) 0.9 % 10 mL injection  40 mg IntraVENous Daily    bumetanide (BUMEX) injection 2 mg  2 mg IntraVENous BID    losartan (COZAAR) tablet 100 mg  100 mg Oral Daily    [Held by provider] heparin (porcine) injection 5,000 Units  5,000 Units SubCUTAneous 3 times per day    prochlorperazine (COMPAZINE) injection 5 mg  5 mg IntraVENous Q6H PRN    niCARdipine (CARDENE) 25 mg in sodium chloride 0.9 % 250 mL infusion ([id number])  2.5-15 mg/hr IntraVENous Continuous    tiZANidine (ZANAFLEX) tablet 4 mg  4 mg Oral Q8H PRN          Objective:     Vitals:  Blood pressure (!) 161/82, pulse 80, temperature 97.9 °F (36.6 °C), temperature source Axillary, resp. rate 17, height 1.753 m (5' 9\"), weight 
Nephrology Progress Note  CAMERON LEAHY [facility] [place] [facility]  [address]  [place] [address]  Phone - [phone]  Fax - [phone]                 Patient: Zoltan Fontana Jr                     YOB: 1960        Date- 10/13/2024                                     Admit Date: 10/10/2024   CC: Follow up for DEBRA, proteinuria          IMPRESSION & PLAN:   DEBRA(suspect secondary nephrotic syndrome because of NSAID use)  Hypertensive urgency  NAGMA  Anasarca  Hypoalbuminemia      PLAN-  Plan for renal biopsy tomorrow  N.p.o. after midnight  Ordered for CBC, BMP and PT/INR tomorrow morning  Continue losartan and Bumex  Will need pulse dose steroids after biopsy     Subjective:  Interval History:   -Seen and examined today  -Spoke to wife at bedside    Objective:   Vitals:    10/13/24 1043 10/13/24 1123 10/13/24 1210 10/13/24 1326   BP:  (!) 168/91 (!) 164/87    Pulse:  86 80    Resp:  16  20   Temp: 97.8 °F (36.6 °C) 98.2 °F (36.8 °C)     TempSrc:  Oral     SpO2:  99% 97%    Weight:       Height:          I/O last 3 completed shifts:  In: 940 [P.O.:840; IV Piggyback:100]  Out: 5575 [Urine:5575]  I/O this shift:  In: 840 [P.O.:840]  Out: 1200 [Urine:1200]      Physical exam:    GEN: NAD  NECK- no mass  RESP: No wheezing, decreased BS b/l  CVS: S1,S2  RRR  NEURO: Normal speech, Non focal  EXT: No Edema   PSYCH: Normal Mood    Chart reviewed.         Pertinent Notes reviewed.     Data Review :  Lab Results   Component Value Date/Time     10/13/2024 03:38 AM    K 3.6 10/13/2024 03:38 AM     10/13/2024 03:38 AM    CO2 21 10/13/2024 03:38 AM    BUN 30 10/13/2024 03:38 AM    CREATININE 2.43 10/13/2024 03:38 AM    GLUCOSE 90 10/13/2024 03:38 AM    CALCIUM 7.5 10/13/2024 03:38 AM       Lab Results   Component Value Date    WBC 7.7 10/13/2024    HGB 12.2 10/13/2024    HCT 36.1 (L) 10/13/2024    MCV 89.4 10/13/2024     10/13/2024    
Patient up to bathroom, wife came to nurse stating patient in pain. Patient states \"while up in bathroom to urinate, increased pain 8-9/10, facial grimacing\" pointing to supra pubic area pointing to mid left and right supra pubic. Voided 250ml. Post void bladder scan 8ml. Notified Dr. Taylor. New order received.  JOYCE Hdz  
Spiritual Health History and Assessment/Progress Note  [facility]    Rituals, Rites and Sacraments,  ,  ,      Name: Zoltan Fontana Jr MRN: [id number]    Age: 64 y.o.     Sex: male   Language: English   Alevism: Amish   ARF (acute renal failure) ([facility])     Date: 10/15/2024            Total Time Calculated: 5 min              Spiritual Assessment began in [facility] B3 INTERMEDIATE CARE UNIT        Referral/Consult From: Clergy/   Encounter Overview/Reason: Rituals, Rites and Sacraments  Service Provided For: Patient    Gunjan, Belief, Meaning:   Patient unable to assess at this time  Family/Friends No family/friends present      Importance and Influence:  Patient unable to assess at this time  Family/Friends No family/friends present    Community:  Patient Other: unable to access  Family/Friends No family/friends present    Assessment and Plan of Care:     Patient Interventions include: Provided sacramental/Episcopalian ritual  Family/Friends Interventions include: No family/friends present    Patient Plan of Care: Spiritual Care available upon further referral  Family/Friends Plan of Care: Spiritual Care available upon further referral    Electronically signed by Chaplain TISHA on 10/15/2024 at 1:27 PM     [employer] visit attempted.Mr. Addi Jr..is Amish. He was not in his room at the time of the visit.  Prayer for spiritual communion offered for his well-being.     Sr. MARISEL Navarro, RN, ACSW, LCSW   Page:  [phone]  
Spiritual Health History and Assessment/Progress Note  [facility] [place] [facility]    Rituals, Rites and Sacraments,  ,  ,      Name: Zoltan Fontana Jr MRN: [id number]    Age: 64 y.o.     Sex: male   Language: English   Church: Scientologist   ARF (acute renal failure) (HCC)     Date: 10/17/2024            Total Time Calculated: 5 min              Spiritual Assessment began in [facility] B5 MULTI-SPECIALTY ONCOLOGY 2        Referral/Consult From: Clergy/   Encounter Overview/Reason: Rituals, Rites and Sacraments  Service Provided For: Patient    Gunjan, Belief, Meaning:   Patient is connected with a gunjan tradition or spiritual practice  Family/Friends are connected with a gunjan tradition or spiritual practice      Importance and Influence:  Patient has spiritual/personal beliefs that influence decisions regarding their health  Family/Friends have spiritual/personal beliefs that influence decisions regarding the patient's health    Community:  Patient is connected with a spiritual community  Family/Friends are connected with a spiritual community:    Assessment and Plan of Care:     Patient Interventions include: Provided sacramental/Nondenominational ritual  Family/Friends Interventions include: Provided sacramental/Nondenominational ritual    Patient Plan of Care: Spiritual Care available upon further referral  Family/Friends Plan of Care: Spiritual Care available upon further referral    Electronically signed by Chaplain TISHA on 10/17/2024 at 2:35 PM     [employer] visit. Mr. Addi Jr was in bed. His daughter was visiting.  They declined communion today because their practice is once a week.  They are parishioners of [place].  Prayer requested and offered.  No further spiritual needs identified at this time.     Sr. MARISEL Navarro, RN, ACSW, LCSW   Page:  [phone]    
Ultrasound IV by Teresita Murray RN :  Procedure Note    Ultrasound IV education provided to patient. Opportunities for questions given.     Ultrasound used for PIV placement:  20gauge 1.25in BD Nexiva  L forearm location.  1 X Attempt(s).    Flushed with ease; vigorous blood return.     Procedure tolerated well. Primary RN aware of IV placement and added to LDA.      Teresita Murray RN   
  Bilateral pleural effusions  Due to renal failure.  IV Lasix.    Hypocalcemia  Corrected calcium is about 9-10  Monitor serum calcium levels    Hyperlipidemia  , triglycerides 300  Noted allergies to started in the chart, will review with the patient regarding initiation of statin.    Nonobstructing left renal calculus  Noted on the CT    #BMI (Calculated): 29.37    I have personally reviewed the radiographs, laboratory data in Epic and decisions and statements above are based partially on this personal interpretation.                 Care Plan discussed with: Patient and Family    Discussed:  Care Plan    Prophylaxis:  Hep SQ    Disposition:  Home w/Family               ___________________________________________________    Attending Physician: Grace Taylor MD        Subjective:     Chief Complaint: Abdominal pain initially improved but now having lower abdominal pain with some tenderness as well.  No nausea or vomiting. Report headache. No other complaints at this time.  Scheduled for biopsy today.                Objective:       Vitals:          Last 24hrs VS reviewed since prior progress note. Most recent are:    Vitals:    10/15/24 1145   BP: (!) 162/86   Pulse: 86   Resp: 14   Temp:    SpO2: 96%     SpO2 Readings from Last 6 Encounters:   10/15/24 96%   10/10/24 97%   09/23/24 96%   09/16/24 96%   12/19/23 98%   09/18/23 95%          Intake/Output Summary (Last 24 hours) at 10/15/2024 1219  Last data filed at 10/15/2024 0800  Gross per 24 hour   Intake --   Output 3450 ml   Net -3450 ml          Exam:     Physical Exam:      Gen:    Well-developed, well-nourished, in no acute distress  HEENT:  Pink conjunctivae, PERRL, hearing intact to voice, moist mucous membranes  Neck:  Supple, without masses, thyroid non-tender  Resp:  No accessory muscle use, clear breath sounds without wheezes rales or rhonchi  Card:   No murmurs, normal S1, S2 without thrills, bruits, bilateral leg edema  Abd:  Soft, 
Max:98.7 °F (37.1 °C)      Intake and Output:  10/16 0701 - 10/16 1900  In: 240 [P.O.:240]  Out: 600 [Urine:600]  10/14 1901 - 10/16 0700  In: -   Out: 4250 [Urine:4250]    Physical Exam:               GENERAL ASSESSMENT: NAD  HEENT: Nontraumatic   CHEST: unlabored  ABDOMEN: Soft  EXTREMITY: trace EDEMA  NEURO: Grossly non focal          ECG/rhythm:    Data Review        Recent Labs     10/14/24  0510 10/15/24  0335 10/16/24  0418    140 138   K 3.5 3.9 3.6   * 115* 112*   CO2 20* 23 21   BUN 25* 26* 26*   CREATININE 1.95* 2.00* 1.87*   GLUCOSE 87 106* 100   CALCIUM 7.7* 7.3* 7.6*         : Vince Mena MD  10/16/2024        [facility]:  [email or web address]  [email or web address]    [place] office:  [address], [address]  [place] [address]  Phone: [phone]  Fax :     [phone]    [place] office:  [address] [place] [address]  Phone - [phone]  Fax - [phone]                                                                                        
antibiotics and monitor clinically     Bilateral pleural effusions  Due to renal failure.  IV Lasix.    Hypocalcemia  Corrected calcium is about 9  Monitor serum calcium levels    Hyperlipidemia  , triglycerides 300  Noted allergies to started in the chart, will review with the patient regarding initiation of statin.    Nonobstructing left renal calculus  Noted on the CT    #BMI (Calculated): 29.37    I have personally reviewed the radiographs, laboratory data in Epic and decisions and statements above are based partially on this personal interpretation.                 Care Plan discussed with: Patient and Family    Discussed:  Care Plan    Prophylaxis:  Hep SQ    Disposition:  Home w/Family               ___________________________________________________    Attending Physician: Grace Taylor MD        Subjective:     Chief Complaint: Abdominal pain is better today.  Denied any nausea or vomiting.  Edema slowly improving.                Objective:       Vitals:          Last 24hrs VS reviewed since prior progress note. Most recent are:    Vitals:    10/13/24 0800   BP: (!) 162/88   Pulse:    Resp: 18   Temp: 97.4 °F (36.3 °C)   SpO2:      SpO2 Readings from Last 6 Encounters:   10/13/24 94%   10/10/24 97%   09/23/24 96%   09/16/24 96%   12/19/23 98%   09/18/23 95%          Intake/Output Summary (Last 24 hours) at 10/13/2024 0933  Last data filed at 10/13/2024 0641  Gross per 24 hour   Intake 820 ml   Output 4200 ml   Net -3380 ml          Exam:     Physical Exam:      Gen:    Well-developed, well-nourished, in no acute distress  HEENT:  Pink conjunctivae, PERRL, hearing intact to voice, moist mucous membranes  Neck:  Supple, without masses, thyroid non-tender  Resp:  No accessory muscle use, clear breath sounds without wheezes rales or rhonchi  Card:   No murmurs, normal S1, S2 without thrills, bruits, bilateral leg edema  Abd:  Soft, non-tender, non-distended, normoactive bowel sounds are present, no palpable 
current laboratory results, and actual Xray films in order to assess, support vital system function, and to treat this degree of vital organ system failure, and to prevent further life threatening deterioration of the patient’s condition.                 ___________________________________________________    Attending Physician: Grace Taylor MD        Subjective:     Chief Complaint:  Still short of breath but improving. Unable to lie flat.  Continue to have lower extremity edema.  No chest pain, cough, fever , sore throat.                Objective:       Vitals:          Last 24hrs VS reviewed since prior progress note. Most recent are:    Vitals:    10/11/24 1215   BP: 138/75   Pulse: 71   Resp: 11   Temp:    SpO2: 98%     SpO2 Readings from Last 6 Encounters:   10/11/24 98%   10/10/24 97%   09/23/24 96%   09/16/24 96%   12/19/23 98%   09/18/23 95%          Intake/Output Summary (Last 24 hours) at 10/11/2024 1417  Last data filed at 10/11/2024 1100  Gross per 24 hour   Intake 440 ml   Output 450 ml   Net -10 ml          Exam:     Physical Exam:      Gen:    Well-developed, well-nourished, in no acute distress  HEENT:  Pink conjunctivae, PERRL, hearing intact to voice, moist mucous membranes  Neck:  Supple, without masses, thyroid non-tender  Resp:  No accessory muscle use, clear breath sounds without wheezes rales or rhonchi  Card:   No murmurs, normal S1, S2 without thrills, bruits or peripheral edema  Abd:  Soft, non-tender, non-distended, normoactive bowel sounds are present, no palpable organomegaly and no detectable hernias  Lymph:  No cervical or inguinal adenopathy  Musc:  No cyanosis or clubbing  Skin:   No rashes or ulcers, skin turgor is good  Neuro:  Cranial nerves are grossly intact, no focal motor weakness, follows commands appropriately  Psych:  Good insight, oriented to person, place and time, alert         Telemetry reviewed:   normal sinus rhythm    Medications Reviewed: (see below)    Lab Data 
release tablet 5 mg  5 mg Oral Q4H PRN    lidocaine 4 % external patch 1 patch  1 patch TransDERmal Daily    NIFEdipine (PROCARDIA XL) extended release tablet 90 mg  90 mg Oral Daily    ipratropium 0.5 mg-albuterol 2.5 mg (DUONEB) nebulizer solution 1 Dose  1 Dose Inhalation Q6H PRN    polyethylene glycol (GLYCOLAX) packet 17 g  17 g Oral Daily    hydrALAZINE (APRESOLINE) injection 10 mg  10 mg IntraVENous Q4H PRN    labetalol (NORMODYNE;TRANDATE) injection 10 mg  10 mg IntraVENous Q6H PRN    bumetanide (BUMEX) injection 2 mg  2 mg IntraVENous BID    losartan (COZAAR) tablet 100 mg  100 mg Oral Daily    prochlorperazine (COMPAZINE) injection 5 mg  5 mg IntraVENous Q6H PRN    tiZANidine (ZANAFLEX) tablet 4 mg  4 mg Oral Q8H PRN          Objective:     Vitals:  Blood pressure (!) 161/92, pulse 100, temperature 97.3 °F (36.3 °C), temperature source Oral, resp. rate 18, height 1.753 m (5' 9\"), weight 76.5 kg (168 lb 10.4 oz), SpO2 96%.  Temp (24hrs), Av.9 °F (36.6 °C), Min:97.3 °F (36.3 °C), Max:98.2 °F (36.8 °C)      Intake and Output:  No intake/output data recorded.  No intake/output data recorded.    Physical Exam:               GENERAL ASSESSMENT: NAD  HEENT: Nontraumatic   CHEST: unlabored  ABDOMEN: Soft  EXTREMITY: trace EDEMA  NEURO: Grossly non focal          ECG/rhythm:    Data Review        Recent Labs     10/17/24  0657 10/18/24  0026 10/19/24  0145    137 138   K 3.5 3.2* 4.1    108 109*   CO2 25 24 23   BUN 24* 28* 33*   CREATININE 1.69* 1.87* 1.98*   GLUCOSE 104* 138* 140*   CALCIUM 7.5* 7.4* 7.7*         : Amauri Gordon MD  10/19/2024        [facility]:  [email or web address]  [email or web address]    [place] office:  [address], [address]  [place] [address]  Phone: [phone]  Fax :     [phone]    [place] office:  [address] [place] [address]  Phone - [phone]  Fax - [phone]                                       
°F (36.3 °C), Max:98.4 °F (36.9 °C)      Intake and Output:  10/15 0701 - 10/15 1900  In: -   Out: 600 [Urine:600]  10/13 1901 - 10/15 0700  In: -   Out: 5850 [Urine:5850]    Physical Exam:               GENERAL ASSESSMENT: NAD  HEENT: Nontraumatic   CHEST: CTA  HEART: S1S2  ABDOMEN: Soft  EXTREMITY: trace EDEMA  NEURO: Grossly non focal          ECG/rhythm:    Data Review        Recent Labs     10/13/24  0338 10/14/24  0510 10/15/24  0335    138 140   K 3.6 3.5 3.9   * 114* 115*   CO2 21 20* 23   BUN 30* 25* 26*   CREATININE 2.43* 1.95* 2.00*   GLUCOSE 90 87 106*   CALCIUM 7.5* 7.7* 7.3*         : Vince Mena MD  10/15/2024        [facility]:  [email or web address]  [email or web address]    [place] office:  [address], [address]  [place] [address]  Phone: [phone]  Fax :     [phone]    [place] office:  [address] [place] [address]  Phone - [phone]  Fax - [phone]                                                                                        
   tiZANidine (ZANAFLEX) tablet 4 mg  4 mg Oral Q8H PRN            Lab Review:     Recent Labs     10/16/24  0418 10/17/24  0657 10/18/24  0026   WBC 7.8 6.1 6.4   HGB 13.3 12.1 12.8   HCT 38.5 35.1* 37.3    201 234     Recent Labs     10/16/24  0418 10/17/24  0657 10/18/24  0026    137 137   K 3.6 3.5 3.2*   * 107 108   CO2 21 25 24   BUN 26* 24* 28*     No components found for: \"GLPOC\"

## 2024-10-19 NOTE — CARE COORDINATION
Care Management Progress Note (weekend coverage)    Reason for Admission:   ARF (acute renal failure) ([facility]) [N17.9]  Acute renal failure, unspecified acute renal failure type (HCC) [N17.9]  Community acquired pneumonia, unspecified laterality [J18.9]         Patient Admission Status: Inpatient  RUR:  11%  Hospitalization in the last 30 days (Readmission):  No        Transition Plan of Care:  Nephrology following for medical management   Plan is for pt to return home  Outpatient follow up  Pt will arrange his own transportation home     CM will continue to follow pt from afar and will assist with any discharge needs.  Gina

## 2024-10-20 VITALS
SYSTOLIC BLOOD PRESSURE: 137 MMHG | RESPIRATION RATE: 16 BRPM | WEIGHT: 168.65 LBS | DIASTOLIC BLOOD PRESSURE: 60 MMHG | TEMPERATURE: 97.7 F | HEART RATE: 70 BPM | BODY MASS INDEX: 24.98 KG/M2 | HEIGHT: 69 IN | OXYGEN SATURATION: 97 %

## 2024-10-20 PROBLEM — N17.9 ARF (ACUTE RENAL FAILURE) (HCC): Status: RESOLVED | Noted: 2024-10-10 | Resolved: 2024-10-20

## 2024-10-20 PROCEDURE — 6370000000 HC RX 637 (ALT 250 FOR IP): Performed by: STUDENT IN AN ORGANIZED HEALTH CARE EDUCATION/TRAINING PROGRAM

## 2024-10-20 PROCEDURE — 2580000003 HC RX 258: Performed by: INTERNAL MEDICINE

## 2024-10-20 PROCEDURE — 94761 N-INVAS EAR/PLS OXIMETRY MLT: CPT

## 2024-10-20 PROCEDURE — 6360000002 HC RX W HCPCS: Performed by: INTERNAL MEDICINE

## 2024-10-20 PROCEDURE — 6370000000 HC RX 637 (ALT 250 FOR IP): Performed by: INTERNAL MEDICINE

## 2024-10-20 RX ORDER — POLYETHYLENE GLYCOL 3350 17 G/17G
17 POWDER, FOR SOLUTION ORAL DAILY
Qty: 30 PACKET | Refills: 0 | Status: SHIPPED | OUTPATIENT
Start: 2024-10-21 | End: 2024-11-20

## 2024-10-20 RX ORDER — BISACODYL 10 MG
10 SUPPOSITORY, RECTAL RECTAL DAILY PRN
Qty: 15 SUPPOSITORY | Refills: 1 | Status: SHIPPED | OUTPATIENT
Start: 2024-10-20 | End: 2024-11-19

## 2024-10-20 RX ORDER — BUMETANIDE 2 MG/1
2 TABLET ORAL DAILY
Qty: 30 TABLET | Refills: 3 | Status: SHIPPED | OUTPATIENT
Start: 2024-10-20

## 2024-10-20 RX ORDER — NIFEDIPINE 30 MG/1
90 TABLET, EXTENDED RELEASE ORAL DAILY
Qty: 30 TABLET | Refills: 3 | Status: SHIPPED | OUTPATIENT
Start: 2024-10-21

## 2024-10-20 RX ORDER — PREDNISONE 20 MG/1
80 TABLET ORAL DAILY
Qty: 30 TABLET | Refills: 1 | Status: SHIPPED | OUTPATIENT
Start: 2024-10-20 | End: 2024-11-04

## 2024-10-20 RX ORDER — ATORVASTATIN CALCIUM 20 MG/1
20 TABLET, FILM COATED ORAL NIGHTLY
Qty: 30 TABLET | Refills: 3 | Status: SHIPPED | OUTPATIENT
Start: 2024-10-20

## 2024-10-20 RX ORDER — LOSARTAN POTASSIUM 100 MG/1
100 TABLET ORAL DAILY
Qty: 30 TABLET | Refills: 3 | Status: SHIPPED | OUTPATIENT
Start: 2024-10-21

## 2024-10-20 RX ORDER — LIDOCAINE 4 G/G
1 PATCH TOPICAL DAILY
Qty: 15 EACH | Refills: 0 | Status: SHIPPED | OUTPATIENT
Start: 2024-10-21 | End: 2024-11-05

## 2024-10-20 RX ORDER — SULFAMETHOXAZOLE AND TRIMETHOPRIM 800; 160 MG/1; MG/1
1 TABLET ORAL
Qty: 30 TABLET | Refills: 1 | Status: SHIPPED | OUTPATIENT
Start: 2024-10-21 | End: 2025-03-10

## 2024-10-20 RX ADMIN — APIXABAN 2.5 MG: 2.5 TABLET, FILM COATED ORAL at 09:10

## 2024-10-20 RX ADMIN — NIFEDIPINE 90 MG: 30 TABLET, FILM COATED, EXTENDED RELEASE ORAL at 09:09

## 2024-10-20 RX ADMIN — BUMETANIDE 2 MG: 0.25 INJECTION INTRAMUSCULAR; INTRAVENOUS at 09:09

## 2024-10-20 RX ADMIN — POLYETHYLENE GLYCOL 3350 17 G: 17 POWDER, FOR SOLUTION ORAL at 09:09

## 2024-10-20 RX ADMIN — LOSARTAN POTASSIUM 100 MG: 50 TABLET, FILM COATED ORAL at 09:09

## 2024-10-20 RX ADMIN — PANTOPRAZOLE SODIUM 40 MG: 40 TABLET, DELAYED RELEASE ORAL at 06:48

## 2024-10-20 RX ADMIN — METHYLPREDNISOLONE SODIUM SUCCINATE 1000 MG: 1 INJECTION INTRAMUSCULAR; INTRAVENOUS at 09:21

## 2024-10-20 RX ADMIN — DICYCLOMINE HYDROCHLORIDE 20 MG: 20 TABLET ORAL at 06:47

## 2024-10-20 RX ADMIN — SENNOSIDES AND DOCUSATE SODIUM 2 TABLET: 50; 8.6 TABLET ORAL at 09:09

## 2024-10-20 ASSESSMENT — PAIN SCALES - GENERAL
PAINLEVEL_OUTOF10: 5
PAINLEVEL_OUTOF10: 0
PAINLEVEL_OUTOF10: 5

## 2024-10-20 NOTE — DISCHARGE SUMMARY
Hospitalist Discharge Summary     Patient ID:  Zoltan Fontana Jr  [id number]  64 y.o.  1960    Admit date: 10/10/2024    Discharge date and time: 10/20/2024    Admission Diagnoses: ARF (acute renal failure) ([facility]) [N17.9]  Acute renal failure, unspecified acute renal failure type (HCC) [N17.9]  Community acquired pneumonia, unspecified laterality [J18.9]    Discharge Diagnoses:    Principal Problem (Resolved):    ARF (acute renal failure) ([facility])  Active Problems:    * No active hospital problems. *         Hospital Course:     Patient is a 64-year-old male with history of chronic back pain who was recently diagnosed with streptococcal infection last month and he also developed allergic reaction to Augmentin comes to the hospital with acute renal failure and left lower lobe pneumonia. Patient admitted for IV antibiotics, nephrology consult.     Membranous glomerulopathy   Acute renal failure  Anasarca  Hypoalbuminemia, proteinuria  NAGMA  Kidney, biopsy:        Membranous glomerulopathy   Nephrology on board  S/p solumedrol 1 g daily for 3 days over weekend, with plan to DC on Monday  plan  Discharge home on 80 mg Pred daily, patient has had received a call already from the clinic and scheduled appointment for follow-up  Bactrim DS 1 po MWF for prophylaxis  Eliquis 2.5 mg BID for prophylaxis (due to MN with severe hypoalbuminemia)  Atorvastatin 20 mg daily  Continue ARB and diuretic     Hypertensive emergency  S/p Cardene drip.  Transitioned to p.o. antihypertensive.    Continue losartan 100 mg daily.  Procardia was added which is increased to 90 mg daily.  Patient also on Bumex 2 g IV twice daily, swich to po on d/c      Acute pancreatitis, 10/12/24  Abdominal pain  Constipation  CT abdomen and pelvis without contrast obtained, findings consistent with pancreatitis with associated gallbladder distention  Ultrasound right upper quadrant showed no signs of acute cholecystitis  LFTs within normal

## 2024-10-20 NOTE — DISCHARGE INSTRUCTIONS
HOSPITALIST DISCHARGE INSTRUCTIONS  NAME:  Zoltan Fontana Jr   :  1960   MRN:  [id number]     Date/Time:  10/20/2024 9:24 AM    ADMIT DATE: 10/10/2024     DISCHARGE DATE: 10/20/2024     DISCHARGE DIAGNOSIS:  Membranous glomerupathy     DISCHARGE INSTRUCTIONS:  Thank you for allowing us to participate in your care. Your discharging Hospitalist is Parish Argueta MD. You were admitted for evaluation and treatment of the above.     Membranous glomerulopathy   Acute renal failure  Anasarca  Hypoalbuminemia, proteinuria  NAGMA  Kidney, biopsy:        Membranous glomerulopathy   Nephrology on board  S/p solumedrol 1 g daily for 3 days over weekend, with plan to DC on Monday  plan  Discharge home on 80 mg Pred daily, patient has had received a call already from the clinic and scheduled appointment for follow-up  Bactrim DS 1 po MWF for prophylaxis  Eliquis 2.5 mg BID for prophylaxis (due to MN with severe hypoalbuminemia)  Atorvastatin 20 mg daily  Continue ARB and diuretic     Hypertensive emergency  S/p Cardene drip.  Transitioned to p.o. antihypertensive.    Continue losartan 100 mg daily.  Procardia was added which is increased to 90 mg daily.  Patient also on Bumex 2 g IV twice daily, swich to po on d/c      Acute pancreatitis, 10/12/24  Abdominal pain  CT abdomen and pelvis without contrast obtained, findings consistent with pancreatitis with associated gallbladder distention  Ultrasound right upper quadrant showed no signs of acute cholecystitis  LFTs within normal limits  Triglyceride 300  Repeated CT abd/pelvius 10/16/24:  Significant interval improvement in pancreatitis, with minimal residual peripancreatic inflammation.  10/19 patient continues to report persistence of abdominal pain, and has been complaining of burning with urination UA was ordered that was unremarkable except for presence of proteinuria which is expected with Membranous glomerulopathy   KUB : no ileus, no obstruction   Plan  Continue

## 2024-10-20 NOTE — CARE COORDINATION
Care Management Progress Note    Reason for Admission:   ARF (acute renal failure) ([facility]) [N17.9]  Acute renal failure, unspecified acute renal failure type (HCC) [N17.9]  Community acquired pneumonia, unspecified laterality [J18.9]         Patient Admission Status: Inpatient  RUR: 11%  Hospitalization in the last 30 days (Readmission):  No        Transition Plan of Care:  Nephrology following for medical management   Plan is for pt to return home  Outpatient follow up  Pt will arrange his own transportation home     No needs indicated.  Gina

## 2024-10-21 ENCOUNTER — PATIENT MESSAGE (OUTPATIENT)
Age: 64
End: 2024-10-21

## 2024-10-22 ENCOUNTER — TELEPHONE (OUTPATIENT)
Age: 64
End: 2024-10-22

## 2024-10-22 LAB
IGG SERPL-MCNC: 2727 MG/DL (ref 603–1613)
IGG1 SER-MCNC: 1508 MG/DL (ref 248–810)
IGG2 SER-MCNC: 293 MG/DL (ref 130–555)
IGG3 SER-MCNC: 76 MG/DL (ref 15–102)
IGG4 SER-MCNC: 281 MG/DL (ref 2–96)

## 2024-10-22 NOTE — TELEPHONE ENCOUNTER
Care Transitions Initial Follow Up Call    Outreach made within 2 business days of discharge: Yes    Patient: Zoltan Fontana Jr Patient : 1960   MRN: [id number]  Reason for Admission: Acute Renal Failure  Discharge Date: 10/20/24       Spoke with: patient & family    Discharge department/facility: Home with family support    TCM Interactive Patient Contact:  Was patient able to fill all prescriptions: Yes  Was patient instructed to bring all medications to the follow-up visit: Yes  Is patient taking all medications as directed in the discharge summary? Yes  Does patient understand their discharge instructions: Yes  Does patient have questions or concerns that need addressed prior to 7-14 day follow up office visit: no    Additional needs identified to be addressed with provider  No needs identified             Scheduled appointment with PCP within 7-14 days    Follow Up  Future Appointments   Date Time Provider Department Center   10/29/2024  8:30 AM [facility]       Leeann Oneil RN    [No Acute Distress] : no acute distress [Well Nourished] : well nourished [Well Developed] : well developed [Well-Appearing] : well-appearing [Normal Oropharynx] : the oropharynx was normal [Normal Outer Ear/Nose] : the outer ears and nose were normal in appearance [Normal TMs] : both tympanic membranes were normal [No JVD] : no jugular venous distention [Supple] : supple [No Lymphadenopathy] : no lymphadenopathy [No Respiratory Distress] : no respiratory distress  [No Accessory Muscle Use] : no accessory muscle use [Clear to Auscultation] : lungs were clear to auscultation bilaterally [Normal Rate] : normal rate  [Regular Rhythm] : with a regular rhythm [Normal S1, S2] : normal S1 and S2 [No Edema] : there was no peripheral edema [No Extremity Clubbing/Cyanosis] : no extremity clubbing/cyanosis [Normal Supraclavicular Nodes] : no supraclavicular lymphadenopathy [Normal Posterior Cervical Nodes] : no posterior cervical lymphadenopathy [Normal Anterior Cervical Nodes] : no anterior cervical lymphadenopathy [de-identified] : no clear cut cobblestoning; sinuses NT, pharynx w/o exudate, no oral lesions [de-identified] : no rales, wheeze, ronchi noted

## 2024-10-27 NOTE — PROGRESS NOTES
Palpations: Abdomen is soft.   Musculoskeletal:         General: Normal range of motion.      Cervical back: Normal range of motion.   Skin:     General: Skin is warm.   Neurological:      General: No focal deficit present.      Mental Status: He is alert and oriented to person, place, and time. Mental status is at baseline.   Psychiatric:         Mood and Affect: Mood normal.         Behavior: Behavior normal.         Thought Content: Thought content normal.         Judgment: Judgment normal.            On this date 10/29/2024 I have spent 45 minutes reviewing previous notes, test results and face to face with the patient discussing the diagnosis and importance of compliance with the treatment plan as well as documenting on the day of the visit.  The patient (or guardian, if applicable) and other individuals in attendance with the patient were advised that Artificial Intelligence will be utilized during this visit to record, process the conversation to generate a clinical note, and support improvement of the AI technology. The patient (or guardian, if applicable) and other individuals in attendance at the appointment consented to the use of AI, including the recording.                   An electronic signature was used to authenticate this note.    --Toya Carlisle MD

## 2024-10-29 ENCOUNTER — OFFICE VISIT (OUTPATIENT)
Age: 64
End: 2024-10-29
Payer: MEDICARE

## 2024-10-29 ENCOUNTER — PATIENT MESSAGE (OUTPATIENT)
Age: 64
End: 2024-10-29

## 2024-10-29 VITALS
TEMPERATURE: 98.1 F | HEIGHT: 69 IN | SYSTOLIC BLOOD PRESSURE: 138 MMHG | OXYGEN SATURATION: 98 % | BODY MASS INDEX: 24.91 KG/M2 | HEART RATE: 92 BPM | RESPIRATION RATE: 16 BRPM | WEIGHT: 168.2 LBS | DIASTOLIC BLOOD PRESSURE: 77 MMHG

## 2024-10-29 DIAGNOSIS — E78.5 HYPERLIPIDEMIA LDL GOAL <100: ICD-10-CM

## 2024-10-29 DIAGNOSIS — M54.50 CHRONIC BILATERAL LOW BACK PAIN WITHOUT SCIATICA: ICD-10-CM

## 2024-10-29 DIAGNOSIS — I10 PRIMARY HYPERTENSION: ICD-10-CM

## 2024-10-29 DIAGNOSIS — G89.29 CHRONIC BILATERAL LOW BACK PAIN WITHOUT SCIATICA: ICD-10-CM

## 2024-10-29 DIAGNOSIS — N05.2 IDIOPATHIC MEMBRANOUS GLOMERULOPATHY: Primary | ICD-10-CM

## 2024-10-29 PROCEDURE — 1036F TOBACCO NON-USER: CPT | Performed by: INTERNAL MEDICINE

## 2024-10-29 PROCEDURE — 3078F DIAST BP <80 MM HG: CPT | Performed by: INTERNAL MEDICINE

## 2024-10-29 PROCEDURE — G8484 FLU IMMUNIZE NO ADMIN: HCPCS | Performed by: INTERNAL MEDICINE

## 2024-10-29 PROCEDURE — 3075F SYST BP GE 130 - 139MM HG: CPT | Performed by: INTERNAL MEDICINE

## 2024-10-29 PROCEDURE — G8420 CALC BMI NORM PARAMETERS: HCPCS | Performed by: INTERNAL MEDICINE

## 2024-10-29 PROCEDURE — 99215 OFFICE O/P EST HI 40 MIN: CPT | Performed by: INTERNAL MEDICINE

## 2024-10-29 PROCEDURE — G8427 DOCREV CUR MEDS BY ELIG CLIN: HCPCS | Performed by: INTERNAL MEDICINE

## 2024-10-29 PROCEDURE — 1111F DSCHRG MED/CURRENT MED MERGE: CPT | Performed by: INTERNAL MEDICINE

## 2024-10-29 PROCEDURE — 3017F COLORECTAL CA SCREEN DOC REV: CPT | Performed by: INTERNAL MEDICINE

## 2024-11-03 ENCOUNTER — HOSPITAL ENCOUNTER (INPATIENT)
Facility: HOSPITAL | Age: 64
LOS: 1 days | Discharge: HOME | DRG: 103 | End: 2024-11-05
Attending: EMERGENCY MEDICINE | Admitting: HOSPITALIST
Payer: MEDICARE

## 2024-11-03 ENCOUNTER — HOSPITAL ENCOUNTER (EMERGENCY)
Facility: HOSPITAL | Age: 64
Discharge: HOME | DRG: 103 | End: 2024-11-03
Attending: EMERGENCY MEDICINE
Payer: MEDICARE

## 2024-11-03 ENCOUNTER — APPOINTMENT (OUTPATIENT)
Facility: HOSPITAL | Age: 64
DRG: 103 | End: 2024-11-03
Payer: MEDICARE

## 2024-11-03 VITALS
DIASTOLIC BLOOD PRESSURE: 75 MMHG | RESPIRATION RATE: 16 BRPM | HEIGHT: 69 IN | TEMPERATURE: 98.3 F | OXYGEN SATURATION: 98 % | HEART RATE: 67 BPM | BODY MASS INDEX: 23.99 KG/M2 | SYSTOLIC BLOOD PRESSURE: 151 MMHG | WEIGHT: 162 LBS

## 2024-11-03 DIAGNOSIS — R51.9 ACUTE NONINTRACTABLE HEADACHE, UNSPECIFIED HEADACHE TYPE: Primary | ICD-10-CM

## 2024-11-03 DIAGNOSIS — I10 ESSENTIAL HYPERTENSION: ICD-10-CM

## 2024-11-03 DIAGNOSIS — R51.9 ACUTE INTRACTABLE HEADACHE, UNSPECIFIED HEADACHE TYPE: Primary | ICD-10-CM

## 2024-11-03 LAB
ALBUMIN SERPL-MCNC: 2.3 G/DL (ref 3.5–5)
ALBUMIN SERPL-MCNC: 2.4 G/DL (ref 3.5–5)
ALBUMIN/GLOB SERPL: 0.5 (ref 1.1–2.2)
ALBUMIN/GLOB SERPL: 0.5 (ref 1.1–2.2)
ALP SERPL-CCNC: 118 U/L (ref 45–117)
ALP SERPL-CCNC: 120 U/L (ref 45–117)
ALT SERPL-CCNC: 53 U/L (ref 12–78)
ALT SERPL-CCNC: 63 U/L (ref 12–78)
ANION GAP SERPL CALC-SCNC: 5 MMOL/L (ref 2–12)
ANION GAP SERPL CALC-SCNC: 5 MMOL/L (ref 2–12)
AST SERPL-CCNC: 18 U/L (ref 15–37)
AST SERPL-CCNC: 18 U/L (ref 15–37)
BASOPHILS # BLD: 0 K/UL (ref 0–0.1)
BASOPHILS # BLD: 0 K/UL (ref 0–0.1)
BASOPHILS NFR BLD: 0 % (ref 0–1)
BASOPHILS NFR BLD: 0 % (ref 0–1)
BILIRUB SERPL-MCNC: 0.7 MG/DL (ref 0.2–1)
BILIRUB SERPL-MCNC: 1.3 MG/DL (ref 0.2–1)
BUN SERPL-MCNC: 24 MG/DL (ref 6–20)
BUN SERPL-MCNC: 26 MG/DL (ref 6–20)
BUN/CREAT SERPL: 24 (ref 12–20)
BUN/CREAT SERPL: 31 (ref 12–20)
CALCIUM SERPL-MCNC: 7.7 MG/DL (ref 8.5–10.1)
CALCIUM SERPL-MCNC: 8.4 MG/DL (ref 8.5–10.1)
CHLORIDE SERPL-SCNC: 100 MMOL/L (ref 97–108)
CHLORIDE SERPL-SCNC: 100 MMOL/L (ref 97–108)
CO2 SERPL-SCNC: 24 MMOL/L (ref 21–32)
CO2 SERPL-SCNC: 25 MMOL/L (ref 21–32)
COMMENT:: NORMAL
CREAT SERPL-MCNC: 0.84 MG/DL (ref 0.7–1.3)
CREAT SERPL-MCNC: 1.02 MG/DL (ref 0.7–1.3)
DIFFERENTIAL METHOD BLD: ABNORMAL
DIFFERENTIAL METHOD BLD: ABNORMAL
EKG ATRIAL RATE: 73 BPM
EKG DIAGNOSIS: NORMAL
EKG P AXIS: 68 DEGREES
EKG P-R INTERVAL: 122 MS
EKG Q-T INTERVAL: 374 MS
EKG QRS DURATION: 86 MS
EKG QTC CALCULATION (BAZETT): 412 MS
EKG R AXIS: 11 DEGREES
EKG T AXIS: 35 DEGREES
EKG VENTRICULAR RATE: 73 BPM
EOSINOPHIL # BLD: 0 K/UL (ref 0–0.4)
EOSINOPHIL # BLD: 0 K/UL (ref 0–0.4)
EOSINOPHIL NFR BLD: 0 % (ref 0–7)
EOSINOPHIL NFR BLD: 0 % (ref 0–7)
ERYTHROCYTE [DISTWIDTH] IN BLOOD BY AUTOMATED COUNT: 14 % (ref 11.5–14.5)
ERYTHROCYTE [DISTWIDTH] IN BLOOD BY AUTOMATED COUNT: 14 % (ref 11.5–14.5)
GLOBULIN SER CALC-MCNC: 4.3 G/DL (ref 2–4)
GLOBULIN SER CALC-MCNC: 4.5 G/DL (ref 2–4)
GLUCOSE SERPL-MCNC: 117 MG/DL (ref 65–100)
GLUCOSE SERPL-MCNC: 137 MG/DL (ref 65–100)
HCT VFR BLD AUTO: 40.5 % (ref 36.6–50.3)
HCT VFR BLD AUTO: 42 % (ref 36.6–50.3)
HGB BLD-MCNC: 14.1 G/DL (ref 12.1–17)
HGB BLD-MCNC: 14.5 G/DL (ref 12.1–17)
IMM GRANULOCYTES # BLD AUTO: 0.1 K/UL (ref 0–0.04)
IMM GRANULOCYTES # BLD AUTO: 0.2 K/UL (ref 0–0.04)
IMM GRANULOCYTES NFR BLD AUTO: 1 % (ref 0–0.5)
IMM GRANULOCYTES NFR BLD AUTO: 1 % (ref 0–0.5)
LYMPHOCYTES # BLD: 0.5 K/UL (ref 0.8–3.5)
LYMPHOCYTES # BLD: 0.7 K/UL (ref 0.8–3.5)
LYMPHOCYTES NFR BLD: 3 % (ref 12–49)
LYMPHOCYTES NFR BLD: 6 % (ref 12–49)
MCH RBC QN AUTO: 30.5 PG (ref 26–34)
MCH RBC QN AUTO: 30.6 PG (ref 26–34)
MCHC RBC AUTO-ENTMCNC: 34.5 G/DL (ref 30–36.5)
MCHC RBC AUTO-ENTMCNC: 34.8 G/DL (ref 30–36.5)
MCV RBC AUTO: 87.9 FL (ref 80–99)
MCV RBC AUTO: 88.2 FL (ref 80–99)
MONOCYTES # BLD: 0.6 K/UL (ref 0–1)
MONOCYTES # BLD: 0.6 K/UL (ref 0–1)
MONOCYTES NFR BLD: 4 % (ref 5–13)
MONOCYTES NFR BLD: 5 % (ref 5–13)
NEUTS SEG # BLD: 10.7 K/UL (ref 1.8–8)
NEUTS SEG # BLD: 13.7 K/UL (ref 1.8–8)
NEUTS SEG NFR BLD: 88 % (ref 32–75)
NEUTS SEG NFR BLD: 92 % (ref 32–75)
NRBC # BLD: 0 K/UL (ref 0–0.01)
NRBC # BLD: 0 K/UL (ref 0–0.01)
NRBC BLD-RTO: 0 PER 100 WBC
NRBC BLD-RTO: 0 PER 100 WBC
NT PRO BNP: 148 PG/ML
PLATELET # BLD AUTO: 279 K/UL (ref 150–400)
PLATELET # BLD AUTO: 291 K/UL (ref 150–400)
PMV BLD AUTO: 8.7 FL (ref 8.9–12.9)
PMV BLD AUTO: 8.9 FL (ref 8.9–12.9)
POTASSIUM SERPL-SCNC: 4.2 MMOL/L (ref 3.5–5.1)
POTASSIUM SERPL-SCNC: 4.6 MMOL/L (ref 3.5–5.1)
PROT SERPL-MCNC: 6.6 G/DL (ref 6.4–8.2)
PROT SERPL-MCNC: 6.9 G/DL (ref 6.4–8.2)
RBC # BLD AUTO: 4.61 M/UL (ref 4.1–5.7)
RBC # BLD AUTO: 4.76 M/UL (ref 4.1–5.7)
RBC MORPH BLD: ABNORMAL
RBC MORPH BLD: ABNORMAL
SODIUM SERPL-SCNC: 129 MMOL/L (ref 136–145)
SODIUM SERPL-SCNC: 130 MMOL/L (ref 136–145)
SPECIMEN HOLD: NORMAL
TROPONIN I SERPL HS-MCNC: 16 NG/L (ref 0–76)
WBC # BLD AUTO: 12.1 K/UL (ref 4.1–11.1)
WBC # BLD AUTO: 15 K/UL (ref 4.1–11.1)

## 2024-11-03 PROCEDURE — 93010 ELECTROCARDIOGRAM REPORT: CPT | Performed by: SPECIALIST

## 2024-11-03 PROCEDURE — 6370000000 HC RX 637 (ALT 250 FOR IP): Performed by: EMERGENCY MEDICINE

## 2024-11-03 PROCEDURE — 93005 ELECTROCARDIOGRAM TRACING: CPT | Performed by: EMERGENCY MEDICINE

## 2024-11-03 PROCEDURE — 96367 TX/PROPH/DG ADDL SEQ IV INF: CPT

## 2024-11-03 PROCEDURE — 85025 COMPLETE CBC W/AUTO DIFF WBC: CPT

## 2024-11-03 PROCEDURE — 70450 CT HEAD/BRAIN W/O DYE: CPT

## 2024-11-03 PROCEDURE — 2500000003 HC RX 250 WO HCPCS: Performed by: EMERGENCY MEDICINE

## 2024-11-03 PROCEDURE — 2580000003 HC RX 258: Performed by: EMERGENCY MEDICINE

## 2024-11-03 PROCEDURE — 36415 COLL VENOUS BLD VENIPUNCTURE: CPT

## 2024-11-03 PROCEDURE — 6360000002 HC RX W HCPCS: Performed by: EMERGENCY MEDICINE

## 2024-11-03 PROCEDURE — 99285 EMERGENCY DEPT VISIT HI MDM: CPT

## 2024-11-03 PROCEDURE — 96365 THER/PROPH/DIAG IV INF INIT: CPT

## 2024-11-03 PROCEDURE — 83880 ASSAY OF NATRIURETIC PEPTIDE: CPT

## 2024-11-03 PROCEDURE — 80053 COMPREHEN METABOLIC PANEL: CPT

## 2024-11-03 PROCEDURE — 84484 ASSAY OF TROPONIN QUANT: CPT

## 2024-11-03 PROCEDURE — 96375 TX/PRO/DX INJ NEW DRUG ADDON: CPT

## 2024-11-03 PROCEDURE — 72125 CT NECK SPINE W/O DYE: CPT

## 2024-11-03 RX ORDER — BUTALBITAL, ACETAMINOPHEN AND CAFFEINE 50; 325; 40 MG/1; MG/1; MG/1
2 TABLET ORAL ONCE
Status: DISCONTINUED | OUTPATIENT
Start: 2024-11-03 | End: 2024-11-03

## 2024-11-03 RX ORDER — METOCLOPRAMIDE HYDROCHLORIDE 5 MG/ML
10 INJECTION INTRAMUSCULAR; INTRAVENOUS
Status: COMPLETED | OUTPATIENT
Start: 2024-11-03 | End: 2024-11-03

## 2024-11-03 RX ORDER — DIPHENHYDRAMINE HYDROCHLORIDE 50 MG/ML
25 INJECTION INTRAMUSCULAR; INTRAVENOUS
Status: COMPLETED | OUTPATIENT
Start: 2024-11-03 | End: 2024-11-03

## 2024-11-03 RX ORDER — ACETAMINOPHEN 500 MG
1000 TABLET ORAL
Status: COMPLETED | OUTPATIENT
Start: 2024-11-03 | End: 2024-11-03

## 2024-11-03 RX ORDER — METOCLOPRAMIDE HYDROCHLORIDE 5 MG/ML
10 INJECTION INTRAMUSCULAR; INTRAVENOUS ONCE
Status: COMPLETED | OUTPATIENT
Start: 2024-11-03 | End: 2024-11-03

## 2024-11-03 RX ORDER — 0.9 % SODIUM CHLORIDE 0.9 %
100 INTRAVENOUS SOLUTION INTRAVENOUS ONCE
Status: COMPLETED | OUTPATIENT
Start: 2024-11-03 | End: 2024-11-03

## 2024-11-03 RX ORDER — 0.9 % SODIUM CHLORIDE 0.9 %
1000 INTRAVENOUS SOLUTION INTRAVENOUS ONCE
Status: COMPLETED | OUTPATIENT
Start: 2024-11-03 | End: 2024-11-03

## 2024-11-03 RX ORDER — KETOROLAC TROMETHAMINE 15 MG/ML
15 INJECTION, SOLUTION INTRAMUSCULAR; INTRAVENOUS ONCE
Status: COMPLETED | OUTPATIENT
Start: 2024-11-03 | End: 2024-11-03

## 2024-11-03 RX ORDER — MORPHINE SULFATE 4 MG/ML
4 INJECTION, SOLUTION INTRAMUSCULAR; INTRAVENOUS ONCE
Status: COMPLETED | OUTPATIENT
Start: 2024-11-03 | End: 2024-11-03

## 2024-11-03 RX ORDER — BUTALBITAL, ACETAMINOPHEN AND CAFFEINE 50; 325; 40 MG/1; MG/1; MG/1
1 TABLET ORAL
Status: COMPLETED | OUTPATIENT
Start: 2024-11-03 | End: 2024-11-03

## 2024-11-03 RX ORDER — BUTALBITAL, ACETAMINOPHEN AND CAFFEINE 300; 40; 50 MG/1; MG/1; MG/1
1 CAPSULE ORAL EVERY 6 HOURS PRN
Qty: 11 CAPSULE | Refills: 0 | Status: ON HOLD | OUTPATIENT
Start: 2024-11-03 | End: 2024-11-05

## 2024-11-03 RX ORDER — MAGNESIUM SULFATE IN WATER 40 MG/ML
2000 INJECTION, SOLUTION INTRAVENOUS ONCE
Status: COMPLETED | OUTPATIENT
Start: 2024-11-03 | End: 2024-11-03

## 2024-11-03 RX ORDER — ACETAMINOPHEN 325 MG/1
650 TABLET ORAL
Status: DISCONTINUED | OUTPATIENT
Start: 2024-11-03 | End: 2024-11-03

## 2024-11-03 RX ADMIN — DIPHENHYDRAMINE HYDROCHLORIDE 25 MG: 50 INJECTION, SOLUTION INTRAMUSCULAR; INTRAVENOUS at 21:27

## 2024-11-03 RX ADMIN — KETOROLAC TROMETHAMINE 15 MG: 15 INJECTION, SOLUTION INTRAMUSCULAR; INTRAVENOUS at 04:56

## 2024-11-03 RX ADMIN — METOCLOPRAMIDE 10 MG: 5 INJECTION, SOLUTION INTRAMUSCULAR; INTRAVENOUS at 21:25

## 2024-11-03 RX ADMIN — SODIUM CHLORIDE 100 ML: 9 INJECTION, SOLUTION INTRAVENOUS at 04:33

## 2024-11-03 RX ADMIN — ACETAMINOPHEN 1000 MG: 500 TABLET ORAL at 21:32

## 2024-11-03 RX ADMIN — MAGNESIUM SULFATE HEPTAHYDRATE 2000 MG: 40 INJECTION, SOLUTION INTRAVENOUS at 21:15

## 2024-11-03 RX ADMIN — ACETAMINOPHEN 1000 MG: 500 TABLET ORAL at 04:00

## 2024-11-03 RX ADMIN — BUTALBITAL, ACETAMINOPHEN, AND CAFFEINE 1 TABLET: 50; 325; 40 TABLET ORAL at 06:08

## 2024-11-03 RX ADMIN — VALPROATE SODIUM 500 MG: 100 INJECTION, SOLUTION INTRAVENOUS at 23:39

## 2024-11-03 RX ADMIN — MORPHINE SULFATE 4 MG: 4 INJECTION, SOLUTION INTRAMUSCULAR; INTRAVENOUS at 21:28

## 2024-11-03 RX ADMIN — SODIUM CHLORIDE 1000 ML: 9 INJECTION, SOLUTION INTRAVENOUS at 23:00

## 2024-11-03 RX ADMIN — DIPHENHYDRAMINE HYDROCHLORIDE 25 MG: 50 INJECTION INTRAMUSCULAR; INTRAVENOUS at 04:05

## 2024-11-03 RX ADMIN — METOCLOPRAMIDE 10 MG: 5 INJECTION, SOLUTION INTRAMUSCULAR; INTRAVENOUS at 04:08

## 2024-11-03 ASSESSMENT — PAIN DESCRIPTION - PAIN TYPE: TYPE: ACUTE PAIN

## 2024-11-03 ASSESSMENT — PAIN DESCRIPTION - FREQUENCY: FREQUENCY: CONTINUOUS

## 2024-11-03 ASSESSMENT — PAIN DESCRIPTION - LOCATION
LOCATION: HEAD

## 2024-11-03 ASSESSMENT — PAIN SCALES - GENERAL
PAINLEVEL_OUTOF10: 8
PAINLEVEL_OUTOF10: 10

## 2024-11-03 ASSESSMENT — PAIN - FUNCTIONAL ASSESSMENT
PAIN_FUNCTIONAL_ASSESSMENT: 0-10
PAIN_FUNCTIONAL_ASSESSMENT: 0-10

## 2024-11-03 ASSESSMENT — PAIN DESCRIPTION - DESCRIPTORS
DESCRIPTORS: PRESSURE;POUNDING
DESCRIPTORS: ACHING
DESCRIPTORS: THROBBING

## 2024-11-03 NOTE — ED NOTES
Patient changed into a gown. Patient also given a pillow and blanket for warmth and comfort at this time.

## 2024-11-03 NOTE — ED NOTES
I have reviewed discharge instructions with the patient and spouse.  Opportunity for questions and clarification was provided.  The patient and spouse verbalized understanding.  Patient discharged out of the ED via W/C with mild difficulty and in stable condition.

## 2024-11-03 NOTE — ED PROVIDER NOTES
Systolic BP Percentile Diastolic BP Percentile Temp Temp src Pulse Resp SpO2   -- -- -- -- -- -- -- --      Height Weight         -- --             There is no height or weight on file to calculate BMI.    Physical Exam  Vitals and nursing note reviewed.   Constitutional:       Appearance: He is not ill-appearing.   Cardiovascular:      Rate and Rhythm: Normal rate.   Pulmonary:      Effort: No respiratory distress.   Neurological:      General: No focal deficit present.      Mental Status: He is alert and oriented to person, place, and time.             EMERGENCY DEPARTMENT COURSE and DIFFERENTIAL DIAGNOSIS/MDM:   Vitals:  There were no vitals filed for this visit.      Medical Decision Making  64-year-old male presents to the emergency department with concern for headache and syncopal event.  He is low risk by [place] syncope rules.  He has a history of glomerulonephritis and is on blood thinners.  He cannot take long-term NSAIDs.  In the emergency department he was given migraine cocktail with improvement in his headache and improvement in his blood pressure.  Will prescribe Fioricet for future headaches.  He will not require admission today.  Recommend follow-up with primary care, [facility].    Amount and/or Complexity of Data Reviewed  Labs: ordered.  Radiology: ordered and independent interpretation performed. Decision-making details documented in ED Course.  ECG/medicine tests: ordered and independent interpretation performed. Decision-making details documented in ED Course.    Risk  OTC drugs.  Prescription drug management.            REASSESSMENT     ED Course as of 11/03/24 0539   Sun Nov 03, 2024   3820 ED EKG interpretation:  Rhythm: sinus rhythm. Rate (approx.): 73.  Axis: normal.  ST segment:  No concerning ST elevations or depressions. This EKG was interpreted by Jhon Palmer MD,ED Physician. [JM]   4044 I have independently viewed the obtained radiographic images and note CT head without

## 2024-11-04 ENCOUNTER — HOSPITAL ENCOUNTER (INPATIENT)
Facility: HOSPITAL | Age: 64
Discharge: HOME | DRG: 103 | End: 2024-11-07
Payer: MEDICARE

## 2024-11-04 ENCOUNTER — APPOINTMENT (OUTPATIENT)
Facility: HOSPITAL | Age: 64
DRG: 103 | End: 2024-11-04
Payer: MEDICARE

## 2024-11-04 PROBLEM — R51.9 ACUTE INTRACTABLE HEADACHE, UNSPECIFIED HEADACHE TYPE: Status: ACTIVE | Noted: 2024-11-04

## 2024-11-04 LAB
ANION GAP SERPL CALC-SCNC: 5 MMOL/L (ref 2–12)
APPEARANCE UR: CLEAR
BACTERIA URNS QL MICRO: NEGATIVE /HPF
BASOPHILS # BLD: 0 K/UL (ref 0–0.1)
BASOPHILS # BLD: 0 K/UL (ref 0–0.1)
BASOPHILS NFR BLD: 0 % (ref 0–1)
BASOPHILS NFR BLD: 0 % (ref 0–1)
BILIRUB UR QL: NEGATIVE
BUN SERPL-MCNC: 21 MG/DL (ref 6–20)
BUN/CREAT SERPL: 27 (ref 12–20)
CALCIUM SERPL-MCNC: 7.5 MG/DL (ref 8.5–10.1)
CHLORIDE SERPL-SCNC: 102 MMOL/L (ref 97–108)
CO2 SERPL-SCNC: 25 MMOL/L (ref 21–32)
COLOR UR: ABNORMAL
COMMENT:: NORMAL
CREAT SERPL-MCNC: 0.77 MG/DL (ref 0.7–1.3)
CREAT UR-MCNC: 38 MG/DL
DIFFERENTIAL METHOD BLD: ABNORMAL
DIFFERENTIAL METHOD BLD: ABNORMAL
EKG ATRIAL RATE: 76 BPM
EKG DIAGNOSIS: NORMAL
EKG P AXIS: 153 DEGREES
EKG P-R INTERVAL: 124 MS
EKG Q-T INTERVAL: 358 MS
EKG QRS DURATION: 80 MS
EKG QTC CALCULATION (BAZETT): 402 MS
EKG R AXIS: 162 DEGREES
EKG T AXIS: 156 DEGREES
EKG VENTRICULAR RATE: 76 BPM
EOSINOPHIL # BLD: 0 K/UL (ref 0–0.4)
EOSINOPHIL # BLD: 0.1 K/UL (ref 0–0.4)
EOSINOPHIL NFR BLD: 0 % (ref 0–7)
EOSINOPHIL NFR BLD: 1 % (ref 0–7)
EPITH CASTS URNS QL MICRO: ABNORMAL /LPF
ERYTHROCYTE [DISTWIDTH] IN BLOOD BY AUTOMATED COUNT: 13.9 % (ref 11.5–14.5)
ERYTHROCYTE [DISTWIDTH] IN BLOOD BY AUTOMATED COUNT: 14.1 % (ref 11.5–14.5)
FLUAV RNA SPEC QL NAA+PROBE: NOT DETECTED
FLUBV RNA SPEC QL NAA+PROBE: NOT DETECTED
GLUCOSE SERPL-MCNC: 110 MG/DL (ref 65–100)
GLUCOSE UR STRIP.AUTO-MCNC: 500 MG/DL
HCT VFR BLD AUTO: 37.2 % (ref 36.6–50.3)
HCT VFR BLD AUTO: 39.7 % (ref 36.6–50.3)
HGB BLD-MCNC: 12.7 G/DL (ref 12.1–17)
HGB BLD-MCNC: 13.4 G/DL (ref 12.1–17)
HGB UR QL STRIP: NEGATIVE
HYALINE CASTS URNS QL MICRO: ABNORMAL /LPF (ref 0–2)
IMM GRANULOCYTES # BLD AUTO: 0.1 K/UL (ref 0–0.04)
IMM GRANULOCYTES # BLD AUTO: 0.1 K/UL (ref 0–0.04)
IMM GRANULOCYTES NFR BLD AUTO: 0 % (ref 0–0.5)
IMM GRANULOCYTES NFR BLD AUTO: 1 % (ref 0–0.5)
KETONES UR QL STRIP.AUTO: NEGATIVE MG/DL
LEUKOCYTE ESTERASE UR QL STRIP.AUTO: NEGATIVE
LIPASE SERPL-CCNC: 54 U/L (ref 13–75)
LYMPHOCYTES # BLD: 0.7 K/UL (ref 0.8–3.5)
LYMPHOCYTES # BLD: 1 K/UL (ref 0.8–3.5)
LYMPHOCYTES NFR BLD: 6 % (ref 12–49)
LYMPHOCYTES NFR BLD: 9 % (ref 12–49)
MCH RBC QN AUTO: 30.3 PG (ref 26–34)
MCH RBC QN AUTO: 30.4 PG (ref 26–34)
MCHC RBC AUTO-ENTMCNC: 33.8 G/DL (ref 30–36.5)
MCHC RBC AUTO-ENTMCNC: 34.1 G/DL (ref 30–36.5)
MCV RBC AUTO: 88.8 FL (ref 80–99)
MCV RBC AUTO: 90 FL (ref 80–99)
MONOCYTES # BLD: 0.5 K/UL (ref 0–1)
MONOCYTES # BLD: 0.9 K/UL (ref 0–1)
MONOCYTES NFR BLD: 4 % (ref 5–13)
MONOCYTES NFR BLD: 8 % (ref 5–13)
NEUTS SEG # BLD: 10.2 K/UL (ref 1.8–8)
NEUTS SEG # BLD: 9 K/UL (ref 1.8–8)
NEUTS SEG NFR BLD: 81 % (ref 32–75)
NEUTS SEG NFR BLD: 89 % (ref 32–75)
NITRITE UR QL STRIP.AUTO: NEGATIVE
NRBC # BLD: 0 K/UL (ref 0–0.01)
NRBC # BLD: 0 K/UL (ref 0–0.01)
NRBC BLD-RTO: 0 PER 100 WBC
NRBC BLD-RTO: 0 PER 100 WBC
OSMOLALITY UR: 434 MOSM/KG H2O
PH UR STRIP: 6.5 (ref 5–8)
PLATELET # BLD AUTO: 266 K/UL (ref 150–400)
PLATELET # BLD AUTO: 267 K/UL (ref 150–400)
PMV BLD AUTO: 9.1 FL (ref 8.9–12.9)
PMV BLD AUTO: 9.5 FL (ref 8.9–12.9)
POTASSIUM SERPL-SCNC: 4.1 MMOL/L (ref 3.5–5.1)
PROT UR STRIP-MCNC: 300 MG/DL
PROT UR-MCNC: 129 MG/DL (ref 0–11.9)
PROT/CREAT UR-RTO: 3.4
RBC # BLD AUTO: 4.19 M/UL (ref 4.1–5.7)
RBC # BLD AUTO: 4.41 M/UL (ref 4.1–5.7)
RBC #/AREA URNS HPF: ABNORMAL /HPF (ref 0–5)
SARS-COV-2 RNA RESP QL NAA+PROBE: NOT DETECTED
SODIUM SERPL-SCNC: 132 MMOL/L (ref 136–145)
SODIUM UR-SCNC: 58 MMOL/L
SOURCE: NORMAL
SP GR UR REFRACTOMETRY: 1.01 (ref 1–1.03)
SPECIMEN HOLD: NORMAL
TSH SERPL DL<=0.05 MIU/L-ACNC: 0.42 UIU/ML (ref 0.36–3.74)
UROBILINOGEN UR QL STRIP.AUTO: 0.2 EU/DL (ref 0.2–1)
WBC # BLD AUTO: 11.1 K/UL (ref 4.1–11.1)
WBC # BLD AUTO: 11.5 K/UL (ref 4.1–11.1)
WBC URNS QL MICRO: ABNORMAL /HPF (ref 0–4)

## 2024-11-04 PROCEDURE — 94761 N-INVAS EAR/PLS OXIMETRY MLT: CPT

## 2024-11-04 PROCEDURE — 6370000000 HC RX 637 (ALT 250 FOR IP): Performed by: HOSPITALIST

## 2024-11-04 PROCEDURE — 87636 SARSCOV2 & INF A&B AMP PRB: CPT

## 2024-11-04 PROCEDURE — 84156 ASSAY OF PROTEIN URINE: CPT

## 2024-11-04 PROCEDURE — 36415 COLL VENOUS BLD VENIPUNCTURE: CPT

## 2024-11-04 PROCEDURE — 71045 X-RAY EXAM CHEST 1 VIEW: CPT

## 2024-11-04 PROCEDURE — 82570 ASSAY OF URINE CREATININE: CPT

## 2024-11-04 PROCEDURE — 97116 GAIT TRAINING THERAPY: CPT

## 2024-11-04 PROCEDURE — 99222 1ST HOSP IP/OBS MODERATE 55: CPT | Performed by: PSYCHIATRY & NEUROLOGY

## 2024-11-04 PROCEDURE — 97535 SELF CARE MNGMENT TRAINING: CPT

## 2024-11-04 PROCEDURE — 96376 TX/PRO/DX INJ SAME DRUG ADON: CPT

## 2024-11-04 PROCEDURE — 84300 ASSAY OF URINE SODIUM: CPT

## 2024-11-04 PROCEDURE — 70498 CT ANGIOGRAPHY NECK: CPT

## 2024-11-04 PROCEDURE — 85025 COMPLETE CBC W/AUTO DIFF WBC: CPT

## 2024-11-04 PROCEDURE — 1100000000 HC RM PRIVATE

## 2024-11-04 PROCEDURE — A9575 INJ GADOTERATE MEGLUMI 0.1ML: HCPCS | Performed by: RADIOLOGY

## 2024-11-04 PROCEDURE — 84443 ASSAY THYROID STIM HORMONE: CPT

## 2024-11-04 PROCEDURE — 2580000003 HC RX 258: Performed by: HOSPITALIST

## 2024-11-04 PROCEDURE — 83690 ASSAY OF LIPASE: CPT

## 2024-11-04 PROCEDURE — 6360000002 HC RX W HCPCS: Performed by: HOSPITALIST

## 2024-11-04 PROCEDURE — 97165 OT EVAL LOW COMPLEX 30 MIN: CPT

## 2024-11-04 PROCEDURE — 6360000004 HC RX CONTRAST MEDICATION: Performed by: RADIOLOGY

## 2024-11-04 PROCEDURE — 70553 MRI BRAIN STEM W/O & W/DYE: CPT

## 2024-11-04 PROCEDURE — 80048 BASIC METABOLIC PNL TOTAL CA: CPT

## 2024-11-04 PROCEDURE — 6370000000 HC RX 637 (ALT 250 FOR IP): Performed by: STUDENT IN AN ORGANIZED HEALTH CARE EDUCATION/TRAINING PROGRAM

## 2024-11-04 PROCEDURE — 97161 PT EVAL LOW COMPLEX 20 MIN: CPT

## 2024-11-04 PROCEDURE — 6360000002 HC RX W HCPCS: Performed by: EMERGENCY MEDICINE

## 2024-11-04 PROCEDURE — 93010 ELECTROCARDIOGRAM REPORT: CPT | Performed by: INTERNAL MEDICINE

## 2024-11-04 PROCEDURE — 6360000004 HC RX CONTRAST MEDICATION: Performed by: STUDENT IN AN ORGANIZED HEALTH CARE EDUCATION/TRAINING PROGRAM

## 2024-11-04 PROCEDURE — 87040 BLOOD CULTURE FOR BACTERIA: CPT

## 2024-11-04 PROCEDURE — 81001 URINALYSIS AUTO W/SCOPE: CPT

## 2024-11-04 PROCEDURE — 83935 ASSAY OF URINE OSMOLALITY: CPT

## 2024-11-04 RX ORDER — SODIUM CHLORIDE 0.9 % (FLUSH) 0.9 %
5-40 SYRINGE (ML) INJECTION PRN
Status: DISCONTINUED | OUTPATIENT
Start: 2024-11-04 | End: 2024-11-05 | Stop reason: HOSPADM

## 2024-11-04 RX ORDER — POTASSIUM CHLORIDE 7.45 MG/ML
10 INJECTION INTRAVENOUS PRN
Status: DISCONTINUED | OUTPATIENT
Start: 2024-11-04 | End: 2024-11-05 | Stop reason: HOSPADM

## 2024-11-04 RX ORDER — PROPRANOLOL HYDROCHLORIDE 10 MG/1
10 TABLET ORAL 2 TIMES DAILY
Status: DISCONTINUED | OUTPATIENT
Start: 2024-11-04 | End: 2024-11-05 | Stop reason: HOSPADM

## 2024-11-04 RX ORDER — MAGNESIUM SULFATE IN WATER 40 MG/ML
2000 INJECTION, SOLUTION INTRAVENOUS PRN
Status: DISCONTINUED | OUTPATIENT
Start: 2024-11-04 | End: 2024-11-05 | Stop reason: HOSPADM

## 2024-11-04 RX ORDER — ONDANSETRON 2 MG/ML
4 INJECTION INTRAMUSCULAR; INTRAVENOUS EVERY 6 HOURS PRN
Status: DISCONTINUED | OUTPATIENT
Start: 2024-11-04 | End: 2024-11-05 | Stop reason: HOSPADM

## 2024-11-04 RX ORDER — FENTANYL CITRATE 50 UG/ML
50 INJECTION, SOLUTION INTRAMUSCULAR; INTRAVENOUS EVERY 6 HOURS PRN
Status: DISCONTINUED | OUTPATIENT
Start: 2024-11-04 | End: 2024-11-05 | Stop reason: HOSPADM

## 2024-11-04 RX ORDER — ACETAMINOPHEN 650 MG/1
650 SUPPOSITORY RECTAL EVERY 6 HOURS PRN
Status: DISCONTINUED | OUTPATIENT
Start: 2024-11-04 | End: 2024-11-05 | Stop reason: HOSPADM

## 2024-11-04 RX ORDER — GADOTERATE MEGLUMINE 376.9 MG/ML
15 INJECTION INTRAVENOUS
Status: COMPLETED | OUTPATIENT
Start: 2024-11-04 | End: 2024-11-04

## 2024-11-04 RX ORDER — SULFAMETHOXAZOLE AND TRIMETHOPRIM 800; 160 MG/1; MG/1
1 TABLET ORAL
Status: DISCONTINUED | OUTPATIENT
Start: 2024-11-04 | End: 2024-11-05 | Stop reason: HOSPADM

## 2024-11-04 RX ORDER — DIPHENHYDRAMINE HCL 25 MG
50 CAPSULE ORAL ONCE
Status: COMPLETED | OUTPATIENT
Start: 2024-11-04 | End: 2024-11-04

## 2024-11-04 RX ORDER — POLYETHYLENE GLYCOL 3350 17 G/17G
17 POWDER, FOR SOLUTION ORAL DAILY PRN
Status: DISCONTINUED | OUTPATIENT
Start: 2024-11-04 | End: 2024-11-05 | Stop reason: HOSPADM

## 2024-11-04 RX ORDER — SODIUM CHLORIDE 0.9 % (FLUSH) 0.9 %
5-40 SYRINGE (ML) INJECTION EVERY 12 HOURS SCHEDULED
Status: DISCONTINUED | OUTPATIENT
Start: 2024-11-04 | End: 2024-11-05 | Stop reason: HOSPADM

## 2024-11-04 RX ORDER — ONDANSETRON 4 MG/1
4 TABLET, ORALLY DISINTEGRATING ORAL EVERY 8 HOURS PRN
Status: DISCONTINUED | OUTPATIENT
Start: 2024-11-04 | End: 2024-11-05 | Stop reason: HOSPADM

## 2024-11-04 RX ORDER — LIDOCAINE 4 G/G
1 PATCH TOPICAL DAILY
Status: DISCONTINUED | OUTPATIENT
Start: 2024-11-04 | End: 2024-11-05 | Stop reason: HOSPADM

## 2024-11-04 RX ORDER — ACETAMINOPHEN 325 MG/1
650 TABLET ORAL EVERY 6 HOURS PRN
Status: DISCONTINUED | OUTPATIENT
Start: 2024-11-04 | End: 2024-11-05 | Stop reason: HOSPADM

## 2024-11-04 RX ORDER — ACETAMINOPHEN 500 MG
500 TABLET ORAL EVERY 6 HOURS PRN
Status: ON HOLD | COMMUNITY
End: 2024-11-05 | Stop reason: HOSPADM

## 2024-11-04 RX ORDER — PANTOPRAZOLE SODIUM 40 MG/1
40 TABLET, DELAYED RELEASE ORAL
Status: DISCONTINUED | OUTPATIENT
Start: 2024-11-04 | End: 2024-11-05 | Stop reason: HOSPADM

## 2024-11-04 RX ORDER — PREDNISONE 20 MG/1
80 TABLET ORAL DAILY
Status: DISCONTINUED | OUTPATIENT
Start: 2024-11-04 | End: 2024-11-05 | Stop reason: HOSPADM

## 2024-11-04 RX ORDER — SODIUM CHLORIDE 9 MG/ML
INJECTION, SOLUTION INTRAVENOUS PRN
Status: DISCONTINUED | OUTPATIENT
Start: 2024-11-04 | End: 2024-11-05 | Stop reason: HOSPADM

## 2024-11-04 RX ORDER — POTASSIUM CHLORIDE 750 MG/1
40 TABLET, EXTENDED RELEASE ORAL PRN
Status: DISCONTINUED | OUTPATIENT
Start: 2024-11-04 | End: 2024-11-05 | Stop reason: HOSPADM

## 2024-11-04 RX ORDER — BUTALBITAL, ACETAMINOPHEN AND CAFFEINE 50; 325; 40 MG/1; MG/1; MG/1
1 TABLET ORAL EVERY 6 HOURS PRN
Status: DISCONTINUED | OUTPATIENT
Start: 2024-11-04 | End: 2024-11-05 | Stop reason: HOSPADM

## 2024-11-04 RX ORDER — LOSARTAN POTASSIUM 50 MG/1
100 TABLET ORAL DAILY
Status: DISCONTINUED | OUTPATIENT
Start: 2024-11-04 | End: 2024-11-05 | Stop reason: HOSPADM

## 2024-11-04 RX ORDER — IOPAMIDOL 755 MG/ML
100 INJECTION, SOLUTION INTRAVASCULAR
Status: COMPLETED | OUTPATIENT
Start: 2024-11-04 | End: 2024-11-04

## 2024-11-04 RX ORDER — MORPHINE SULFATE 4 MG/ML
4 INJECTION, SOLUTION INTRAMUSCULAR; INTRAVENOUS
Status: COMPLETED | OUTPATIENT
Start: 2024-11-04 | End: 2024-11-04

## 2024-11-04 RX ORDER — ENOXAPARIN SODIUM 100 MG/ML
40 INJECTION SUBCUTANEOUS DAILY
Status: DISCONTINUED | OUTPATIENT
Start: 2024-11-04 | End: 2024-11-05 | Stop reason: HOSPADM

## 2024-11-04 RX ORDER — TIZANIDINE 2 MG/1
4 TABLET ORAL EVERY 8 HOURS PRN
Status: DISCONTINUED | OUTPATIENT
Start: 2024-11-04 | End: 2024-11-05 | Stop reason: HOSPADM

## 2024-11-04 RX ORDER — SODIUM CHLORIDE 9 MG/ML
INJECTION, SOLUTION INTRAVENOUS CONTINUOUS
Status: DISPENSED | OUTPATIENT
Start: 2024-11-04 | End: 2024-11-05

## 2024-11-04 RX ORDER — ATORVASTATIN CALCIUM 20 MG/1
20 TABLET, FILM COATED ORAL NIGHTLY
Status: DISCONTINUED | OUTPATIENT
Start: 2024-11-04 | End: 2024-11-05 | Stop reason: HOSPADM

## 2024-11-04 RX ORDER — POLYETHYLENE GLYCOL 3350 17 G/17G
17 POWDER, FOR SOLUTION ORAL DAILY
Status: DISCONTINUED | OUTPATIENT
Start: 2024-11-04 | End: 2024-11-05 | Stop reason: HOSPADM

## 2024-11-04 RX ADMIN — FENTANYL CITRATE 50 MCG: 50 INJECTION INTRAMUSCULAR; INTRAVENOUS at 17:20

## 2024-11-04 RX ADMIN — ATORVASTATIN CALCIUM 20 MG: 20 TABLET, FILM COATED ORAL at 20:19

## 2024-11-04 RX ADMIN — SODIUM CHLORIDE, PRESERVATIVE FREE 10 ML: 5 INJECTION INTRAVENOUS at 08:19

## 2024-11-04 RX ADMIN — FENTANYL CITRATE 50 MCG: 50 INJECTION INTRAMUSCULAR; INTRAVENOUS at 05:19

## 2024-11-04 RX ADMIN — LOSARTAN POTASSIUM 100 MG: 50 TABLET, FILM COATED ORAL at 08:18

## 2024-11-04 RX ADMIN — PROPRANOLOL HYDROCHLORIDE 10 MG: 10 TABLET ORAL at 01:58

## 2024-11-04 RX ADMIN — TIZANIDINE 4 MG: 2 TABLET ORAL at 17:20

## 2024-11-04 RX ADMIN — GADOTERATE MEGLUMINE 15 ML: 376.9 INJECTION INTRAVENOUS at 22:19

## 2024-11-04 RX ADMIN — FENTANYL CITRATE 50 MCG: 50 INJECTION INTRAMUSCULAR; INTRAVENOUS at 23:38

## 2024-11-04 RX ADMIN — FENTANYL CITRATE 50 MCG: 50 INJECTION INTRAMUSCULAR; INTRAVENOUS at 11:18

## 2024-11-04 RX ADMIN — DIPHENHYDRAMINE HYDROCHLORIDE 50 MG: 25 CAPSULE ORAL at 14:02

## 2024-11-04 RX ADMIN — MORPHINE SULFATE 4 MG: 4 INJECTION, SOLUTION INTRAMUSCULAR; INTRAVENOUS at 00:31

## 2024-11-04 RX ADMIN — BUTALBITAL, ACETAMINOPHEN, AND CAFFEINE 1 TABLET: 325; 50; 40 TABLET ORAL at 08:46

## 2024-11-04 RX ADMIN — SODIUM CHLORIDE: 9 INJECTION, SOLUTION INTRAVENOUS at 14:04

## 2024-11-04 RX ADMIN — PROPRANOLOL HYDROCHLORIDE 10 MG: 10 TABLET ORAL at 20:19

## 2024-11-04 RX ADMIN — IOPAMIDOL 90 ML: 755 INJECTION, SOLUTION INTRAVENOUS at 20:42

## 2024-11-04 RX ADMIN — SULFAMETHOXAZOLE AND TRIMETHOPRIM 1 TABLET: 800; 160 TABLET ORAL at 08:46

## 2024-11-04 RX ADMIN — BUTALBITAL, ACETAMINOPHEN, AND CAFFEINE 1 TABLET: 325; 50; 40 TABLET ORAL at 20:19

## 2024-11-04 RX ADMIN — SODIUM CHLORIDE: 9 INJECTION, SOLUTION INTRAVENOUS at 02:06

## 2024-11-04 RX ADMIN — PREDNISONE 80 MG: 20 TABLET ORAL at 08:18

## 2024-11-04 RX ADMIN — ENOXAPARIN SODIUM 40 MG: 100 INJECTION SUBCUTANEOUS at 08:18

## 2024-11-04 ASSESSMENT — PAIN SCALES - GENERAL
PAINLEVEL_OUTOF10: 7
PAINLEVEL_OUTOF10: 8
PAINLEVEL_OUTOF10: 7
PAINLEVEL_OUTOF10: 8
PAINLEVEL_OUTOF10: 0
PAINLEVEL_OUTOF10: 0
PAINLEVEL_OUTOF10: 3
PAINLEVEL_OUTOF10: 7
PAINLEVEL_OUTOF10: 3

## 2024-11-04 ASSESSMENT — PAIN DESCRIPTION - LOCATION
LOCATION: HEAD

## 2024-11-04 ASSESSMENT — PAIN DESCRIPTION - DESCRIPTORS
DESCRIPTORS: THROBBING
DESCRIPTORS: THROBBING
DESCRIPTORS: STABBING
DESCRIPTORS: ACHING

## 2024-11-04 ASSESSMENT — PAIN DESCRIPTION - ORIENTATION
ORIENTATION: ANTERIOR
ORIENTATION: RIGHT;LEFT

## 2024-11-04 NOTE — PLAN OF CARE
Problem: Occupational Therapy - Adult  Goal: By Discharge: Performs self-care activities at highest level of function for planned discharge setting.  See evaluation for individualized goals.  Description: FUNCTIONAL STATUS PRIOR TO ADMISSION:  Patient lives with spouse.  Typically indep with ADL tasks without AD. Has not driven recently.  Hx of migraines x 20 years however recent episodes have been more debilitating and multiple spine surgeries.  Does not work, on disability.   Receives Help From: Family, ADL Assistance: Independent,  ,  ,  ,  ,  , Homemaking Assistance: Independent, Ambulation Assistance: Independent, Transfer Assistance: Independent, Active : Yes     HOME SUPPORT: Patient lived with wife but didn't require assistance. Supportive daughter in area.     Occupational Therapy Goals:  Initiated 11/4/2024  1.  Patient will perform lower body dressing with Supervision within 7 day(s).  2.  Patient will perform simple home management with Supervision within 7 day(s).  3.  Patient will perform bathing with Supervision within 7 day(s).  4.  Patient will perform toilet transfers with Supervision  within 7 day(s).  5.  Patient will perform all aspects of toileting with Supervision within 7 day(s).  6.  Patient will participate in upper extremity therapeutic exercise/activities with Supervision for 5 minutes within 7 day(s).    7.  Patient will utilize energy conservation techniques during functional activities with verbal cues within 7 day(s).  Outcome: Progressing   OCCUPATIONAL THERAPY EVALUATION    Patient: Zoltan Fontana  (64 y.o. male)  Date: 11/4/2024  Primary Diagnosis: Essential hypertension [I10]  Acute intractable headache, unspecified headache type [R51.9]         Precautions: Fall Risk                  ASSESSMENT :  The patient is limited by decreased functional mobility, independence in ADLs, high-level IADLs, activity tolerance, endurance, balance, increased pain levels with admission for  migraines and essential HTN. 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  PTA he had GLF with head injury, Head CT negative for acute changes.  Received with room dark, ear plugs in, eye mask on, agreeable to therapy.   Familiar with back precautions and demo log roll to get to EOB with supervision.  Bps overall stable with change in positioning.  He ambulated to the bathroom at RW level and voided in standing with CGA and progressed to stand at sink for brief hand hygiene with CGA.      Based on the impairments listed above anticipate no additional needs with OT at NH.     Pulse BP Patient Position   11/04/24 1244 70 (!) 144/90 Sitting   11/04/24 1155 64 (!) 146/92 Standing   11/04/24 1152 60 (!) 161/106 Sitting       Functional Outcome Measure:  The patient scored 21/24 on the [facility] outcome measure which is indicative of mild impairment with ADL tasks.         PLAN :  Recommendations and Planned Interventions:   self care training, therapeutic activities, functional mobility training, balance training, therapeutic exercise, endurance activities, patient education, home safety training, and family training/education    Frequency/Duration: OT Plan of Care: 3 times/week    Recommendation for discharge: (in order for the patient to meet his/her long term goals):   No skilled occupational therapy    Other factors to consider for discharge: high risk for falls and concern for safely navigating or managing the home environment    IF patient discharges home will need the following DME: rolling walker       SUBJECTIVE:   Patient stated, “.”  OBJECTIVE DATA SUMMARY:     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

## 2024-11-04 NOTE — H&P
Hospitalist Admission Note    NAME: Zoltan Fontana Jr   :  1960   MRN:  [id number]     Date/Time:  2024 12:34 AM    Patient PCP: Toya Carlisle MD    Please note that this dictation was completed with [employer], the computer voice recognition software.  Quite often unanticipated grammatical, syntax, homophones, and other interpretive errors are inadvertently transcribed by the computer software.  Please disregard these errors.  Please excuse any errors that have escaped final proofreading  ________________________________________________________________________    Given the patient's current clinical presentation, I have a high level of concern for decompensation if discharged from the emergency department.  Complex decision making was performed, which includes reviewing the patient's available past medical records, laboratory results, and x-ray films.       My assessment of this patient's clinical condition and my plan of care is as follows.    Assessment / Plan:    Severe intractable headache, POA causing syncope per patient  --CT head normal 11/3  --ddx symptomatic acute hyponatremia, infection as leukocytosis and on high dose prednisone so at risk for atypical infection.  --check UA, rapid covid/flu, blood cultures  --IVF with NS  --fentanyl prn (he reports this works better than morphine in past), tylenol prn.  If nephrology ok, could try toradol.  Add miralax daily for bowel reigmen  --start propranolol 10mg bid  --neurology consult  --hold eliquis in case need LP    Syncope due to severe headache pain  --monitor on tele  --check orthostatics.  EKG without ischemia and troponin normal 11/3    Acute hyponatremia, POA Na 129, 130 yesterday  --has not taken bumex in > 1 week per patient since it was changed to prn  --suspect hypovolemia, report poor po intake due to HA  --check orthostatic  --IVF with NS 75ml/hr and recheck in AM  --check urine Na, urine osmol, tsh    Leukocytosis WBC 15 up from 12

## 2024-11-04 NOTE — ED PROVIDER NOTES
TO:  No follow-up provider specified.    DISCHARGE MEDICATIONS:  Current Discharge Medication List            (Please note that portions of this note were completed with a voice recognition program.  Efforts were made to edit the dictations but occasionally words are mis-transcribed.)    Praveen Davies MD (electronically signed)  Emergency Attending Physician            Praveen Davies MD  11/04/24 0142

## 2024-11-04 NOTE — CONSULTS
CONSULT - Neurology      Name:  Zoltan Fontana Jr       MRN: [id number]  Location: [facility]/01    Date: 11/4/2024  Time:  11:34 AM        Chief Complaint:   Chief Complaint   Patient presents with    Hypertension    Headache    Dizziness       HPI:  It is a great pleasure to see Zoltan Fontana Jr, a 64 y.o. male today in the hospital . Briefly these are the events happened as per the chart and taken from the chart. The patient was doing fine and the symptoms started with severe headache -being pounded by sledge hammer  . The symptoms fluctuated in the beginning.  The patient was brought to the emergency room for further evaluation. Headache present since September but worse in past 2-3 days. CT head negative for any acute intracranial abnormality.        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 4 surgeries total    CT BIOPSY RENAL  10/15/2024    CT BIOPSY RENAL 10/15/2024 [facility] RAD CT     FAMILY HISTORY:    Family History   Problem Relation Age of Onset    Hypertension Paternal Grandfather     Heart Disease Paternal Grandfather     Heart Disease Paternal Grandmother     Heart Disease Sister     Heart Disease Father     Hypertension Paternal Grandmother     Cancer Mother      SOCIAL HISTORY:   Social History     Tobacco Use    Smoking status: Never    Smokeless tobacco: Never   Substance Use Topics    Alcohol use: Yes     Comment: occasional      ALLERGIES:   Allergies   Allergen Reactions    Augmentin [Amoxicillin-Pot Clavulanate] Anaphylaxis    Bee Venom Palpitations and Shortness Of Breath    Contrast [Barium-Containing Compounds] Other (See Comments)     Severe  MD Parish         CURRENT MEDICATIONS:  Note that completed medications (per the MAR) continue to display for 24 hours. Ordered medications to be given in the future also display.      I've reviewed the Past Medical History, Past Surgical History, Allergies, Medications, Family History (relevant to this case) and Social History and have updated these data in the electronic medical record.  ROS:  All system were reviewed and were negative except for the pertinent positives listed in the HPI.    PHYSICAL EXAMINATION:  Most Recent Vital Signs: Reviewed    Constitutional: Appearance normally developed  Head and face: normocephalic   Eyes:  no dysconjugate gaze  Respiratory: normal effort  Skin: no lesions noted  Carotids: No bruits.  Extremities: Negative for cyanosis,     Neurologic Examination:   Mental status: Alert, attentive, and oriented x3. Speech fluent with intact naming, repetition and comprehension. Normal fund of knowledge present. Normal recent and remote memory. CN: PERRL. EOMI. No nystagmus. Partial and limited fundoscopic exam as pupils are non-dilated. No gross abnormality noticed on fundoscopic exam. VF full to confrontation. Face symmetric with intact sensation. Hearing intact to finger rub bilaterally. Tongue & uvula midline. Palate elevates symmetrically. Symmetric shoulder shrug.  Motor: No drift. Normal tone, bulk and strength 5/5 in bilateral upper & lower extremities  Sensory: Grossly intact to LT.  Coord: Normal FTN testing      STUDIES:  LABS:   Last 24 hours of labs are   Recent Results (from the past 24 hour(s))   CBC with Auto Differential    Collection Time: 11/03/24  8:48 PM   Result Value Ref Range    WBC 15.0 (H) 4.1 - 11.1 K/uL    RBC 4.61 4.10 - 5.70 M/uL    Hemoglobin 14.1 12.1 - 17.0 g/dL    Hematocrit 40.5 36.6 - 50.3 %    MCV 87.9 80.0 - 99.0 FL    MCH 30.6 26.0 - 34.0 PG    MCHC 34.8 30.0 - 36.5 g/dL    RDW 14.0 11.5 - 14.5 %    Platelets 291 150 - 400 K/uL    MPV 8.9 8.9 - 12.9

## 2024-11-04 NOTE — PROGRESS NOTES
Received report from Nikolas COOK for continuation of patient care. Acknowledge information. Patient care assumed.

## 2024-11-04 NOTE — PLAN OF CARE
Problem: Discharge Planning  Goal: Discharge to home or other facility with appropriate resources  Outcome: Progressing  Flowsheets (Taken 11/4/2024 0730)  Discharge to home or other facility with appropriate resources:   Identify barriers to discharge with patient and caregiver   Arrange for needed discharge resources and transportation as appropriate   Identify discharge learning needs (meds, wound care, etc)   Refer to discharge planning if patient needs post-hospital services based on physician order or complex needs related to functional status, cognitive ability or social support system     Problem: Pain  Goal: Verbalizes/displays adequate comfort level or baseline comfort level  Outcome: Progressing  Flowsheets (Taken 11/4/2024 0730)  Verbalizes/displays adequate comfort level or baseline comfort level:   Encourage patient to monitor pain and request assistance   Assess pain using appropriate pain scale   Administer analgesics based on type and severity of pain and evaluate response   Implement non-pharmacological measures as appropriate and evaluate response   Consider cultural and social influences on pain and pain management   Notify Licensed Independent Practitioner if interventions unsuccessful or patient reports new pain     Problem: Safety - Adult  Goal: Free from fall injury  Outcome: Progressing

## 2024-11-04 NOTE — CARE COORDINATION
11/4/2024    4:19 PM  CM received acceptance from [facility].     3:47 PM      Care Management Initial Assessment  11/4/2024 3:47 PM  If patient is discharged prior to next notation, then this note serves as note for discharge by case management.    Reason for Admission:   Essential hypertension [I10]  Acute intractable headache, unspecified headache type [R51.9]         Patient Admission Status: Inpatient  Date Admitted to INP: 11/4/24  RUR: Readmission Risk Score: 18.2    Hospitalization in the last 30 days (Readmission):  Yes        Advance Care Planning:  Code Status: Full Code  Primary Healthcare Decision Maker: Legal Next of Kin  Primary Decision Maker: Vasu Fontanaid - Spouse - [phone]   Advance Directive: has NO advanced directive - not interested in additional information     __________________________________________________________________________  Assessment:      11/04/24 1532   Service Assessment   Patient Orientation Alert and Oriented   Cognition Alert   History Provided By Significant Other  (Pt not available.)   Primary Caregiver Self   Support Systems Spouse/Significant Other   Patient's Healthcare Decision Maker is: Legal Next of Kin   PCP Verified by CM Yes   Last Visit to PCP Within last 3 months   Prior Functional Level Independent in ADLs/IADLs   Current Functional Level Independent in ADLs/IADLs   Can patient return to prior living arrangement Yes   Ability to make needs known: Good   Family able to assist with home care needs: Yes   Would you like for me to discuss the discharge plan with any other family members/significant others, and if so, who? Yes   Financial Resources Medicare   Social/Functional History   Lives With Spouse   Type of Home House   Home Layout Able to Live on Main level with bedroom/bathroom   Home Access Stairs to enter with rails   Entrance Stairs - Number of Steps 6   Home Equipment None   Receives Help From Family   ADL Assistance Independent   Homemaking  Assistance Independent   Ambulation Assistance Independent   Transfer Assistance Independent   Active  Yes   Discharge Planning   Type of Residence House   Living Arrangements Spouse/Significant Other   Current Services Prior To Admission Other (Comment)  (Infusions per Nephrology)   Potential Assistance Purchasing Medications No   Type of Home Care Services None   Patient expects to be discharged to: House   Services At/After Discharge   Transition of Care Consult (CM Consult) Home Health   Internal Home Health No   Reason Outside Agency Chosen Managed care specific requirement   Services At/After Discharge PT;OT;Nursing services   Mode of Transport at Discharge Other (see comment)   Confirm Follow Up Transport Family   Condition of Participation: Discharge Planning   The Plan for Transition of Care is related to the following treatment goals: Migraine   The Patient and/or Patient Representative was provided with a Choice of Provider? Patient;Patient Representative   Name of the Patient Representative who was provided with the Choice of Provider and agrees with the Discharge Plan?  Wife - Erica Fontana   The Patient and/Or Patient Representative agree with the Discharge Plan? Yes   Freedom of Choice list was provided with basic dialogue that supports the patient's individualized plan of care/goals, treatment preferences, and shares the quality data associated with the providers?  Yes       Comments: CM attempted to meet with pt; however, pt deferred due to migraine. CM spoke with pt's wife, Erica, for assessment and dispo planning. Demographics confirmed. Prior to illness, pt was independent with ADLs and iADLs without the use of DME and drove. Since falling ill, pt has had increased weakness and has fallen in the home. CM informed pt's wife of therapy's recommendation for  level care, and pt's wife agreeable. Freedom of choice offered, and preference indicated as [facility]. Pt's wife requesting a RW be

## 2024-11-04 NOTE — ED NOTES
TRANSFER - OUT REPORT:    Verbal report given to JOYCE Schaefer on Zoltan Fontana Jr  being transferred to [facility] for routine progression of patient care       Report consisted of patient's Situation, Background, Assessment and   Recommendations(SBAR).     Information from the following report(s) Nurse Handoff Report, Adult Overview, MAR, Recent Results, and Quality Measures was reviewed with the receiving nurse.    Leeper Fall Assessment:    Presents to emergency department  because of falls (Syncope, seizure, or loss of consciousness): Yes  Age > 70: No  Altered Mental Status, Intoxication with alcohol or substance confusion (Disorientation, impaired judgment, poor safety awaremess, or inability to follow instructions): No  Impaired Mobility: Ambulates or transfers with assistive devices or assistance; Unable to ambulate or transer.: No  Nursing Judgement: Yes          Lines:   Peripheral IV 11/03/24 Right Antecubital (Active)        Opportunity for questions and clarification was provided.      Patient transported with:  Monitor

## 2024-11-04 NOTE — PROGRESS NOTES
Hospitalist Progress Note      NAME:  Zoltan Fontana Jr   :  1960  MRM:  [id number]    Date/Time: 2024  11:24 AM           Assessment / Plan:     Severe intractable headache, POA causing syncope per patient  --CT head normal 11/3  Plan  --fentanyl prn (he reports this works better than morphine in past/ ), tylenol prn.  If nephrology ok, could try toradol.  Add miralax daily for bowel reigmen  --start propranolol 10mg bid  --neurology consulted, will obtain MRI w wo contrast, CTA head and neck   --hold eliquis, in case of LP, pending neurology input      Syncope due to severe headache pain  --monitor on tele  -- EKG without ischemia and troponin normal 11/3     Acute hyponatremia, POA Na 132 today  --IVF with NS 75ml/hr and recheck in AM  -- Nephrology consulted on board     Leukocytosis WBC most likely secondary to chronic steroids  --UA: neg   covid/flu, CXR  --blood cultures, monitor for fever     Membranous Glomerulopathy dx 10/24  --cont prednisone 80mg daily  --cont Bactrim DS MWF for prophylaxis  --hold eliquis in case need LP  --cont lipitor     HTN  --cont losartan  --add propranolol 10mg bid as BP uncontrolled  --he reports nifedipine held due to it causing worsened headache and ok with nephrology     GERD  Epigastric pain with hx pancreatitis 10/24  --cont PPI since on high dose steroid  --lipase 54     Chronic low back pain due to pinch nerve and DDD  Hx 4 spinal surgeries  --tylenol prn, cont tizanidine prn, cont lidocaine patch       #BMI (Calculated): 23.91    I have personally reviewed the radiographs, laboratory data in Epic and decisions and statements above are based partially on this personal interpretation.                 Care Plan discussed with: Patient    Discussed:  Care Plan    Prophylaxis:  Lovenox    Disposition:  Home w/Family           ___________________________________________________    Attending Physician: Parish Argueta MD        Subjective:     Chief Complaint:

## 2024-11-04 NOTE — PLAN OF CARE
Problem: Physical Therapy - Adult  Goal: By Discharge: Performs mobility at highest level of function for planned discharge setting.  See evaluation for individualized goals.  Description: FUNCTIONAL STATUS PRIOR TO ADMISSION: Patient was independent and active without use of DME.    HOME SUPPORT PRIOR TO ADMISSION: The patient lived with spouse but did not require assistance.    Physical Therapy Goals  Initiated 11/4/2024  1.  Patient will move from supine to sit and sit to supine in bed with independence within 7 day(s).    2.  Patient will perform sit to stand with modified independence within 7 day(s).  3.  Patient will transfer from bed to chair and chair to bed with modified independence using the least restrictive device within 7 day(s).  4.  Patient will ambulate with supervision/set-up for 150 feet with the least restrictive device within 7 day(s).   5.  Patient will ascend/descend 8 stairs with  handrail(s) with contact guard assist within 7 day(s).  Outcome: Progressing   PHYSICAL THERAPY EVALUATION    Patient: Zoltan Fontana Jr (64 y.o. male)  Date: 11/4/2024  Primary Diagnosis: Essential hypertension [I10]  Acute intractable headache, unspecified headache type [R51.9]       Precautions:                        ASSESSMENT :   DEFICITS/IMPAIRMENTS:   The patient is limited by decreased functional mobility, body mechanics, activity tolerance, balance, increased pain levels.  Pt admitted due to essential hypertension and intractable HA/migraines.  Syncopal episode at home with GLF striking side of head.  No injury noted.  Pt stating he has suffered with HA's for 20yrs, is [place].  He has hx of 4 back surgeries and on disability.    Based on the impairments listed above pt only needing CGA for gait using RW for 40' to<>from bathroom.  Stood to void without difficulty.  Pt reporting comfort in using RW for now due to fall hx.  RW will need to be ordered if pt continues to express desire to have this device for  \"6-Clicks\" Basic Mobility and Daily Activity Scores With Discharge Destination. Phys Ther. 2021 4;101(4):sbbx410. doi: 10.1093/ptj/vdic846. PMID: 11916046.  3. Pola SINGH, Mary Beth KINNEY, Geni S, Solomon K, Manjit S. Activity Measure for Post-Acute Care \"6-Clicks\" Basic Mobility Scores Predict Discharge Destination After Acute Care Hospitalization in [facility] Patient Groups: A Retrospective, Observational Study. Arch Rehabil Res Clin Transl. 2022 16;4(3):375197. doi: 10.1016/j.arrct..973098. PMID: 36536929; PMCID: PHL3652397.  4. Shakir WHITFIELD, Zari S, Quiana W, Paige P. AM-PAC Short Forms Manual 4.0. Revised 2020.                                                                                                                                                                                                                              Pain Ratin/10   Pain Intervention(s):   nursing notified, nursing notified and addressing, and repositioning    Activity Tolerance:   Good and Fair     After treatment:   Patient left in no apparent distress in bed, Call bell within reach, Caregiver / family present, and Side rails x3    COMMUNICATION/EDUCATION:   The patient's plan of care was discussed with: occupational therapist, registered nurse, and     Patient Education  Education Given To: Patient;Family  Education Provided: Transfer Training;Role of Therapy;Equipment;Plan of Care;Energy Conservation;Fall Prevention Strategies  Education Method: Verbal  Barriers to Learning: None  Education Outcome: Verbalized understanding;Demonstrated understanding    Thank you for this referral.  HELLEN MCKEON, PT  Minutes: 32      Physical Therapy Evaluation Charge Determination   History Examination Presentation Decision-Making   MEDIUM  Complexity : 1-2 comorbidities / personal factors will impact the outcome/ POC  LOW Complexity : 1-2 Standardized tests and measures addressing body structure, function, activity

## 2024-11-04 NOTE — CONSULTS
[facility]    Zoltan Fontana Jr  YOB: 1960          Assessment & Plan:     VICTOR MANUEL-1 + Membranous GN, on modified Ponticelli regimen and responding well  Headache  HTN  Mild hyponatremia    Rec:  Conitinue ARB, prednisone, Bactrim  Neuro eval  Do not feel the sodium was clinically relevant and do not think it explains his HA  Would prefer to minimize use of NSAIDs       Subjective:   CC: Membranous GN  HPI: Pt known to me. He is on month 1 (steroid month) of alternating steroid/CYC for VICTOR MANUEL-1 positive MGN. Response has been good already with improving albumin and P/C ratio.  Superimposed DEBRA has resolved.  ROS: +HA  Current Facility-Administered Medications   Medication Dose Route Frequency    sodium chloride flush 0.9 % injection 5-40 mL  5-40 mL IntraVENous 2 times per day    sodium chloride flush 0.9 % injection 5-40 mL  5-40 mL IntraVENous PRN    0.9 % sodium chloride infusion   IntraVENous PRN    potassium chloride (KLOR-CON) extended release tablet 40 mEq  40 mEq Oral PRN    Or    potassium bicarb-citric acid (EFFER-K) effervescent tablet 40 mEq  40 mEq Oral PRN    Or    potassium chloride 10 mEq/100 mL IVPB (Peripheral Line)  10 mEq IntraVENous PRN    magnesium sulfate 2000 mg in 50 mL IVPB premix  2,000 mg IntraVENous PRN    enoxaparin (LOVENOX) injection 40 mg  40 mg SubCUTAneous Daily    ondansetron (ZOFRAN-ODT) disintegrating tablet 4 mg  4 mg Oral Q8H PRN    Or    ondansetron (ZOFRAN) injection 4 mg  4 mg IntraVENous Q6H PRN    polyethylene glycol (GLYCOLAX) packet 17 g  17 g Oral Daily PRN    acetaminophen (TYLENOL) tablet 650 mg  650 mg Oral Q6H PRN    Or    acetaminophen (TYLENOL) suppository 650 mg  650 mg Rectal Q6H PRN    0.9 % sodium chloride infusion   IntraVENous Continuous    atorvastatin (LIPITOR) tablet 20 mg  20 mg Oral Nightly    butalbital-acetaminophen-caffeine (FIORICET, ESGIC) per tablet 1 tablet  1 tablet Oral Q6H PRN

## 2024-11-04 NOTE — ED TRIAGE NOTES
Pt in due to having high blood pressure and a headache that started around 3pm. Pt was previously seen earlier this morning with the same problems, his symptoms improved for a little but got worse as the day progress.      Pt endorses passing out multiple times today but did not hit his head

## 2024-11-05 VITALS
TEMPERATURE: 97.5 F | HEIGHT: 69 IN | WEIGHT: 166.9 LBS | RESPIRATION RATE: 16 BRPM | DIASTOLIC BLOOD PRESSURE: 78 MMHG | HEART RATE: 65 BPM | OXYGEN SATURATION: 99 % | BODY MASS INDEX: 24.72 KG/M2 | SYSTOLIC BLOOD PRESSURE: 129 MMHG

## 2024-11-05 LAB
ANION GAP SERPL CALC-SCNC: 3 MMOL/L (ref 2–12)
BASOPHILS # BLD: 0 K/UL (ref 0–0.1)
BASOPHILS NFR BLD: 0 % (ref 0–1)
BUN SERPL-MCNC: 19 MG/DL (ref 6–20)
BUN/CREAT SERPL: 23 (ref 12–20)
CALCIUM SERPL-MCNC: 7.5 MG/DL (ref 8.5–10.1)
CHLORIDE SERPL-SCNC: 102 MMOL/L (ref 97–108)
CO2 SERPL-SCNC: 28 MMOL/L (ref 21–32)
CREAT SERPL-MCNC: 0.82 MG/DL (ref 0.7–1.3)
DIFFERENTIAL METHOD BLD: ABNORMAL
EOSINOPHIL # BLD: 0 K/UL (ref 0–0.4)
EOSINOPHIL NFR BLD: 1 % (ref 0–7)
ERYTHROCYTE [DISTWIDTH] IN BLOOD BY AUTOMATED COUNT: 13.9 % (ref 11.5–14.5)
GLUCOSE SERPL-MCNC: 81 MG/DL (ref 65–100)
HCT VFR BLD AUTO: 36 % (ref 36.6–50.3)
HGB BLD-MCNC: 12.5 G/DL (ref 12.1–17)
IMM GRANULOCYTES # BLD AUTO: 0 K/UL (ref 0–0.04)
IMM GRANULOCYTES NFR BLD AUTO: 1 % (ref 0–0.5)
LYMPHOCYTES # BLD: 1.3 K/UL (ref 0.8–3.5)
LYMPHOCYTES NFR BLD: 15 % (ref 12–49)
MCH RBC QN AUTO: 30.9 PG (ref 26–34)
MCHC RBC AUTO-ENTMCNC: 34.7 G/DL (ref 30–36.5)
MCV RBC AUTO: 88.9 FL (ref 80–99)
MONOCYTES # BLD: 0.6 K/UL (ref 0–1)
MONOCYTES NFR BLD: 7 % (ref 5–13)
NEUTS SEG # BLD: 6.4 K/UL (ref 1.8–8)
NEUTS SEG NFR BLD: 76 % (ref 32–75)
NRBC # BLD: 0 K/UL (ref 0–0.01)
NRBC BLD-RTO: 0 PER 100 WBC
PLATELET # BLD AUTO: 218 K/UL (ref 150–400)
PMV BLD AUTO: 8.8 FL (ref 8.9–12.9)
POTASSIUM SERPL-SCNC: 4.1 MMOL/L (ref 3.5–5.1)
RBC # BLD AUTO: 4.05 M/UL (ref 4.1–5.7)
SODIUM SERPL-SCNC: 133 MMOL/L (ref 136–145)
WBC # BLD AUTO: 8.3 K/UL (ref 4.1–11.1)

## 2024-11-05 PROCEDURE — 80048 BASIC METABOLIC PNL TOTAL CA: CPT

## 2024-11-05 PROCEDURE — 6370000000 HC RX 637 (ALT 250 FOR IP): Performed by: HOSPITALIST

## 2024-11-05 PROCEDURE — 6360000002 HC RX W HCPCS: Performed by: HOSPITALIST

## 2024-11-05 PROCEDURE — 85025 COMPLETE CBC W/AUTO DIFF WBC: CPT

## 2024-11-05 PROCEDURE — 2580000003 HC RX 258: Performed by: HOSPITALIST

## 2024-11-05 RX ORDER — PREDNISONE 20 MG/1
80 TABLET ORAL DAILY
Qty: 60 TABLET | Refills: 0 | Status: SHIPPED | OUTPATIENT
Start: 2024-11-05 | End: 2024-11-20

## 2024-11-05 RX ORDER — FENTANYL 25 UG/1
1 PATCH TRANSDERMAL
Qty: 10 PATCH | Refills: 0 | Status: SHIPPED | OUTPATIENT
Start: 2024-11-05 | End: 2024-12-05

## 2024-11-05 RX ORDER — PANTOPRAZOLE SODIUM 40 MG/1
40 TABLET, DELAYED RELEASE ORAL
Qty: 30 TABLET | Refills: 3 | Status: SHIPPED | OUTPATIENT
Start: 2024-11-06

## 2024-11-05 RX ORDER — OXYCODONE HYDROCHLORIDE 10 MG/1
10 TABLET ORAL EVERY 6 HOURS PRN
Qty: 20 TABLET | Refills: 0 | Status: SHIPPED | OUTPATIENT
Start: 2024-11-05 | End: 2024-11-10 | Stop reason: ALTCHOICE

## 2024-11-05 RX ORDER — PREDNISONE 10 MG/1
TABLET ORAL
Qty: 20 TABLET | Refills: 0 | Status: SHIPPED | OUTPATIENT
Start: 2024-11-05 | End: 2024-11-05

## 2024-11-05 RX ORDER — BUTALBITAL, ACETAMINOPHEN AND CAFFEINE 300; 40; 50 MG/1; MG/1; MG/1
1 CAPSULE ORAL EVERY 6 HOURS PRN
Qty: 30 CAPSULE | Refills: 0 | Status: SHIPPED | OUTPATIENT
Start: 2024-11-05

## 2024-11-05 RX ORDER — POLYETHYLENE GLYCOL 3350 17 G/17G
17 POWDER, FOR SOLUTION ORAL DAILY PRN
Qty: 30 PACKET | Refills: 0 | Status: SHIPPED | OUTPATIENT
Start: 2024-11-05 | End: 2024-11-08

## 2024-11-05 RX ORDER — ACETAMINOPHEN 500 MG
1000 TABLET ORAL EVERY 8 HOURS
Qty: 42 TABLET | Refills: 0 | Status: SHIPPED | OUTPATIENT
Start: 2024-11-05 | End: 2024-11-12

## 2024-11-05 RX ORDER — PROPRANOLOL HYDROCHLORIDE 10 MG/1
10 TABLET ORAL 2 TIMES DAILY
Qty: 90 TABLET | Refills: 3 | Status: SHIPPED | OUTPATIENT
Start: 2024-11-05

## 2024-11-05 RX ADMIN — BUTALBITAL, ACETAMINOPHEN, AND CAFFEINE 1 TABLET: 325; 50; 40 TABLET ORAL at 05:08

## 2024-11-05 RX ADMIN — SODIUM CHLORIDE, PRESERVATIVE FREE 10 ML: 5 INJECTION INTRAVENOUS at 08:10

## 2024-11-05 RX ADMIN — PREDNISONE 80 MG: 20 TABLET ORAL at 08:07

## 2024-11-05 RX ADMIN — BUTALBITAL, ACETAMINOPHEN, AND CAFFEINE 1 TABLET: 325; 50; 40 TABLET ORAL at 11:17

## 2024-11-05 RX ADMIN — TIZANIDINE 4 MG: 2 TABLET ORAL at 08:07

## 2024-11-05 RX ADMIN — LOSARTAN POTASSIUM 100 MG: 50 TABLET, FILM COATED ORAL at 08:09

## 2024-11-05 RX ADMIN — ENOXAPARIN SODIUM 40 MG: 100 INJECTION SUBCUTANEOUS at 08:09

## 2024-11-05 RX ADMIN — PANTOPRAZOLE SODIUM 40 MG: 40 TABLET, DELAYED RELEASE ORAL at 05:08

## 2024-11-05 ASSESSMENT — PAIN DESCRIPTION - LOCATION
LOCATION: HEAD
LOCATION: HEAD

## 2024-11-05 ASSESSMENT — PAIN DESCRIPTION - DESCRIPTORS
DESCRIPTORS: ACHING;DISCOMFORT
DESCRIPTORS: ACHING

## 2024-11-05 ASSESSMENT — PAIN SCALES - GENERAL
PAINLEVEL_OUTOF10: 10
PAINLEVEL_OUTOF10: 10
PAINLEVEL_OUTOF10: 5

## 2024-11-05 NOTE — DISCHARGE INSTR - DIET
Good nutrition is important when healing from an illness, injury, or surgery.  Follow any nutrition recommendations given to you during your hospital stay.   If you were given an oral nutrition supplement while in the hospital, continue to take this supplement at home.  You can take it with meals, in-between meals, and/or before bedtime. These supplements can be purchased at most local grocery stores, pharmacies, and chain [employer]-stores.   If you have any questions about your diet or nutrition, call the hospital and ask for the dietitian.    Nutrition Recommendations for Discharge:    Continue Oral Nutrition Supplements at discharge:   Ensure Complete, Ensure Plus or similar product  Once daily for 30 days unless otherwise directed by your Primary Care Physician. This product can be purchased at your local grocery store, pharmacy and/or online.     Yvon Thomas RD RD Office: [phone]

## 2024-11-05 NOTE — CARE COORDINATION
11/5/2024  10:51 AM  Care Management Progress Note    Reason for Admission:   Essential hypertension [I10]  Acute intractable headache, unspecified headache type [R51.9]       Patient Admission Status: Inpatient  Date Admitted to INP: 17% []NA - OBS/Outpatient  RUR: Readmission Risk Score: 17.4    Hospitalization in the last 30 days (Readmission):  Yes        Transition of care plan:  Discharge order submitted. Pt has medically cleared.   Home with HH with [facility], and they were notified of pt's discharge via [facility]. DC summary attached in [facility].   Date IM given: 11/4/24 []NA  Outpatient follow-up.  Discharge transport: Family       11/04/24 6799   Service Assessment   Patient Orientation Alert and Oriented   Cognition Alert   History Provided By Significant Other  (Pt not available.)   Primary Caregiver Self   Support Systems Spouse/Significant Other   Patient's Healthcare Decision Maker is: Legal Next of Kin   PCP Verified by CM Yes   Last Visit to PCP Within last 3 months   Prior Functional Level Independent in ADLs/IADLs   Current Functional Level Independent in ADLs/IADLs   Can patient return to prior living arrangement Yes   Ability to make needs known: Good   Family able to assist with home care needs: Yes   Would you like for me to discuss the discharge plan with any other family members/significant others, and if so, who? Yes   Financial Resources Medicare   Social/Functional History   Lives With Spouse   Type of Home House   Home Layout Able to Live on Main level with bedroom/bathroom   Home Access Stairs to enter with rails   Entrance Stairs - Number of Steps 6   Home Equipment None   Receives Help From Family   ADL Assistance Independent   Homemaking Assistance Independent   Ambulation Assistance Independent   Transfer Assistance Independent   Active  Yes   Discharge Planning   Type of Residence House   Living Arrangements Spouse/Significant Other   Current Services Prior To  Admission Other (Comment)  (Infusions per Nephrology)   Potential Assistance Purchasing Medications No   Type of Home Care Services None   Patient expects to be discharged to: House   Services At/After Discharge   Transition of Care Consult (CM Consult) Home Health   Internal Home Health No   Reason Outside Agency Chosen Managed care specific requirement   Services At/After Discharge PT;OT;Nursing services   Mode of Transport at Discharge Other (see comment)   Confirm Follow Up Transport Family   Condition of Participation: Discharge Planning   The Plan for Transition of Care is related to the following treatment goals: Migraine   The Patient and/or Patient Representative was provided with a Choice of Provider? Patient;Patient Representative   Name of the Patient Representative who was provided with the Choice of Provider and agrees with the Discharge Plan?  Wife - Erica Fontana   The Patient and/Or Patient Representative agree with the Discharge Plan? Yes   Freedom of Choice list was provided with basic dialogue that supports the patient's individualized plan of care/goals, treatment preferences, and shares the quality data associated with the providers?  Yes

## 2024-11-05 NOTE — PLAN OF CARE
Problem: Discharge Planning  Goal: Discharge to home or other facility with appropriate resources  Outcome: Progressing     Problem: Pain  Goal: Verbalizes/displays adequate comfort level or baseline comfort level  Outcome: Progressing     Problem: Safety - Adult  Goal: Free from fall injury  Outcome: Progressing     Problem: Physical Therapy - Adult  Goal: By Discharge: Performs mobility at highest level of function for planned discharge setting.  See evaluation for individualized goals.  Description: FUNCTIONAL STATUS PRIOR TO ADMISSION: Patient was independent and active without use of DME.    HOME SUPPORT PRIOR TO ADMISSION: The patient lived with spouse but did not require assistance.    Physical Therapy Goals  Initiated 11/4/2024  1.  Patient will move from supine to sit and sit to supine in bed with independence within 7 day(s).    2.  Patient will perform sit to stand with modified independence within 7 day(s).  3.  Patient will transfer from bed to chair and chair to bed with modified independence using the least restrictive device within 7 day(s).  4.  Patient will ambulate with supervision/set-up for 150 feet with the least restrictive device within 7 day(s).   5.  Patient will ascend/descend 8 stairs with  handrail(s) with contact guard assist within 7 day(s).  11/4/2024 1512 by Priti English, PT  Outcome: Progressing     Problem: Occupational Therapy - Adult  Goal: By Discharge: Performs self-care activities at highest level of function for planned discharge setting.  See evaluation for individualized goals.  Description: FUNCTIONAL STATUS PRIOR TO ADMISSION:  Patient lives with spouse.  Typically indep with ADL tasks without AD. Has not driven recently.  Hx of migraines x 20 years however recent episodes have been more debilitating and multiple spine surgeries.  Does not work, on disability.   Receives Help From: Family, ADL Assistance: Independent,  ,  ,  ,  ,  , Homemaking Assistance:

## 2024-11-05 NOTE — PROGRESS NOTES
Physical Therapy Note:  Pt received being transported home in transport chair. Pt confirming that he would like RW  to be delivered to his home.  1. The patient has a mobility limitation that significantly impairs their ability to participate in one or more mobility-related activites of daily living in the home.  2. The patient is able to safely use the walker.  3. The functional mobility deficit can be sufficiently resolved by use of walker.   Order placed and CM alerted.  Priti English, PT

## 2024-11-05 NOTE — DISCHARGE SUMMARY
evidence of significant stenosis or aneurysm CTA Neck: 1. No evidence of flow-limiting stenosis. 2. Mild stenosis (less than 50% by NASCET criteria) of the proximal right internal carotid artery. Electronically signed by Richmond Ferris    XR CHEST PORTABLE    Result Date: 11/4/2024  No acute process on portable chest. Electronically signed by CINDY BUTLER    CT CSpine W/O Contrast    Result Date: 11/3/2024  Anterior cervical fusion without hardware complication or acute osseous abnormality. Electronically signed by CINDY BUTLER    CT Head W/O Contrast    Result Date: 11/3/2024  No acute abnormality. Electronically signed by CINDY BUTLER    X-ray lumbar spine complete 4+ views ([id number])    Result Date: 10/31/2024  ddd       PCP: Toya Carlisle MD     Consults: neuro, nephro    Condition of patient at discharge: Improved    Discharge Exam:    Physical Exam:    Gen: Well-developed, well-nourished, in no acute distress  HEENT:  Pink conjunctivae, EOMI, hearing intact to voice, moist mucous membranes  Resp: No accessory muscle use, clear breath sounds without wheezes rales or rhonchi  Card: No murmurs, normal S1, S2 without thrills, bruits or peripheral edema  Abd:  Soft, non-tender, non-distended, normoactive bowel sounds are present, no palpable organomegaly and no detectable hernias  Musc: No cyanosis or clubbing  Skin: No rashes or ulcers, skin turgor is good  Neuro:  Cranial nerves are grossly intact, no focal motor weakness, follows commands appropriately  Psych:  Good insight, oriented to person, place and time, alert          Disposition: home    Patient Instructions:   Current Discharge Medication List        START taking these medications    Details   propranolol (INDERAL) 10 MG tablet Take 1 tablet by mouth 2 times daily  Qty: 90 tablet, Refills: 3      oxyCODONE (OXY-IR) 10 MG immediate release tablet Take 1 tablet by mouth every 6 hours as needed for Pain (for severe pain) for up to 5 days. Intended

## 2024-11-05 NOTE — DISCHARGE INSTRUCTIONS
HOSPITALIST DISCHARGE INSTRUCTIONS  NAME:  Zoltan Fontana Jr   :  1960   MRN:  [id number]     Date/Time:  2024 7:34 AM    ADMIT DATE: 11/3/2024     DISCHARGE DATE: 2024     DISCHARGE DIAGNOSIS:  Intractable headache    DISCHARGE INSTRUCTIONS:  Thank you for allowing us to participate in your care. Your discharging Hospitalist is Belkys Kan MD. You were admitted for evaluation and treatment of the above. You were seen by our neurologist and your medications were changed. You underwent testing for potential causes of your headache. No overt causes were identified. You are well enough to be discharged from the hospital. Your medications were changed,please take as prescribed and follow up with your PCP and neurologist (contact details provided)      MEDICATIONS:    It is important that you take the medication exactly as they are prescribed.   Keep your medication in the bottles provided by the pharmacist and keep a list of the medication names, dosages, and times to be taken in your wallet.   Do not take other medications without consulting your doctor.             If you experience any of the following symptoms then please call your primary care physician or return to the emergency room if you cannot get hold of your doctor:  Fever, chills, nausea, vomiting, diarrhea, change in mentation, falling, bleeding, shortness of breath    Follow Up:  Please call the below provider to arrange hospital follow up appointment      [place] [address]  [phone]  Follow up  [facility], Call if you do not receive a call within 24 hours.    Toya Carlisle MD  [address] [place] [address]  [facility] [id number] [phone]    Schedule an appointment as soon as possible for a visit      Toya Carlisle MD  [address] [place] [address]

## 2024-11-05 NOTE — PLAN OF CARE
D/c paperwork reviewed with pt in detail. VSS. PIV removed, no questions or concerned. Pt wheeled out via w/c.            Problem: Discharge Planning  Goal: Discharge to home or other facility with appropriate resources  11/5/2024 1101 by Amara Najera LPN  Outcome: Completed  11/5/2024 1025 by Amara Najera LPN  Outcome: Progressing  11/5/2024 0428 by Kimmie Collado RN  Outcome: Progressing     Problem: Pain  Goal: Verbalizes/displays adequate comfort level or baseline comfort level  11/5/2024 1101 by Amara Najera LPN  Outcome: Completed  11/5/2024 1025 by Amara Najera LPN  Outcome: Progressing  11/5/2024 0428 by Kimmie Collado RN  Outcome: Progressing     Problem: Safety - Adult  Goal: Free from fall injury  11/5/2024 1101 by Amara Najera LPN  Outcome: Completed  11/5/2024 1025 by Amara Najera LPN  Outcome: Progressing  11/5/2024 0428 by Kimmie Collado RN  Outcome: Progressing

## 2024-11-05 NOTE — PLAN OF CARE
Problem: Discharge Planning  Goal: Discharge to home or other facility with appropriate resources  11/5/2024 1025 by Amara Najera LPN  Outcome: Progressing  11/5/2024 0428 by Kimmie Collado RN  Outcome: Progressing     Problem: Pain  Goal: Verbalizes/displays adequate comfort level or baseline comfort level  11/5/2024 1025 by Amara Najera LPN  Outcome: Progressing  11/5/2024 0428 by Kimmie Collado RN  Outcome: Progressing     Problem: Safety - Adult  Goal: Free from fall injury  11/5/2024 1025 by Amara Najera LPN  Outcome: Progressing  11/5/2024 0428 by Kimmie Collado RN  Outcome: Progressing

## 2024-11-05 NOTE — PROGRESS NOTES
Nutrition Note    Consult for ONS received. Pt has d/c orders.     Added instructions for ONS at home after d/c.     On chart review, MST (malnutrition screening tool) indicated no wt loss. On chart review pt appears to have had significant wt loss over the last 2 months (186 lbs to 166 lbs). Significant for time frame if accurate. If pt does not discharge today, RD will visit for hx. Pt did eat dinner fair/well yesterday.       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    Pancreatitis     Renal failure (ARF), acute on chronic (HCC) 10/10/2024     Edema:None                      Wt Readings from Last 30 Encounters:   11/04/24 75.7 kg (166 lb 14.4 oz)   11/03/24 73.5 kg (162 lb)   10/29/24 76.3 kg (168 lb 3.2 oz)   10/19/24 76.5 kg (168 lb 10.4 oz)   10/10/24 90.4 kg (199 lb 3.2 oz)   09/23/24 84.7 kg (186 lb 12.8 oz)   09/16/24 81.7 kg (180 lb 3.2 oz)   12/19/23 85.7 kg (189 lb)   09/18/23 85.5 kg (188 lb 9.6 oz)   02/28/23 87.5 kg (193 lb)   07/25/22 84.4 kg (186 lb)   04/11/22 87.8 kg (193 lb 9.6 oz)   02/16/22 89.4 kg (197 lb 3.2 oz)          Meal Intake:   Patient Vitals for the past 168 hrs:   PO Meals Eaten (%)   11/04/24 1720 51 - 75%     Supplement Intake:  No data found.      11/05/24    Electronically signed by Yvon Thomas RD on 11/5/24 at 10:51 AM EST    Contact: [phone]

## 2024-11-08 ENCOUNTER — OFFICE VISIT (OUTPATIENT)
Age: 64
DRG: 074 | End: 2024-11-08
Payer: MEDICARE

## 2024-11-08 ENCOUNTER — TELEPHONE (OUTPATIENT)
Age: 64
End: 2024-11-08

## 2024-11-08 VITALS
SYSTOLIC BLOOD PRESSURE: 124 MMHG | WEIGHT: 163.4 LBS | TEMPERATURE: 98.4 F | BODY MASS INDEX: 24.2 KG/M2 | HEART RATE: 76 BPM | DIASTOLIC BLOOD PRESSURE: 74 MMHG | OXYGEN SATURATION: 98 % | HEIGHT: 69 IN

## 2024-11-08 DIAGNOSIS — R51.9 ACUTE NONINTRACTABLE HEADACHE, UNSPECIFIED HEADACHE TYPE: ICD-10-CM

## 2024-11-08 DIAGNOSIS — N05.2 IDIOPATHIC MEMBRANOUS GLOMERULOPATHY: ICD-10-CM

## 2024-11-08 DIAGNOSIS — E87.1 HYPONATREMIA: ICD-10-CM

## 2024-11-08 DIAGNOSIS — I10 PRIMARY HYPERTENSION: ICD-10-CM

## 2024-11-08 DIAGNOSIS — R55 SYNCOPE, UNSPECIFIED SYNCOPE TYPE: ICD-10-CM

## 2024-11-08 DIAGNOSIS — Z09 HOSPITAL DISCHARGE FOLLOW-UP: Primary | ICD-10-CM

## 2024-11-08 DIAGNOSIS — F43.22 ADJUSTMENT REACTION WITH ANXIETY: ICD-10-CM

## 2024-11-08 DIAGNOSIS — D72.829 LEUKOCYTOSIS, UNSPECIFIED TYPE: ICD-10-CM

## 2024-11-08 LAB
ALBUMIN SERPL-MCNC: 2.5 G/DL (ref 3.5–5)
ALBUMIN/GLOB SERPL: 0.7 (ref 1.1–2.2)
ALP SERPL-CCNC: 121 U/L (ref 45–117)
ALT SERPL-CCNC: 53 U/L (ref 12–78)
ANION GAP SERPL CALC-SCNC: 1 MMOL/L (ref 2–12)
AST SERPL-CCNC: 18 U/L (ref 15–37)
BILIRUB SERPL-MCNC: 0.3 MG/DL (ref 0.2–1)
BUN SERPL-MCNC: 26 MG/DL (ref 6–20)
BUN/CREAT SERPL: 30 (ref 12–20)
CALCIUM SERPL-MCNC: 8.6 MG/DL (ref 8.5–10.1)
CHLORIDE SERPL-SCNC: 105 MMOL/L (ref 97–108)
CO2 SERPL-SCNC: 30 MMOL/L (ref 21–32)
CREAT SERPL-MCNC: 0.88 MG/DL (ref 0.7–1.3)
GLOBULIN SER CALC-MCNC: 3.7 G/DL (ref 2–4)
GLUCOSE SERPL-MCNC: 122 MG/DL (ref 65–100)
POTASSIUM SERPL-SCNC: 4.8 MMOL/L (ref 3.5–5.1)
PROT SERPL-MCNC: 6.2 G/DL (ref 6.4–8.2)
SODIUM SERPL-SCNC: 136 MMOL/L (ref 136–145)

## 2024-11-08 PROCEDURE — 99214 OFFICE O/P EST MOD 30 MIN: CPT | Performed by: NURSE PRACTITIONER

## 2024-11-08 ASSESSMENT — PATIENT HEALTH QUESTIONNAIRE - PHQ9
1. LITTLE INTEREST OR PLEASURE IN DOING THINGS: NOT AT ALL
10. IF YOU CHECKED OFF ANY PROBLEMS, HOW DIFFICULT HAVE THESE PROBLEMS MADE IT FOR YOU TO DO YOUR WORK, TAKE CARE OF THINGS AT HOME, OR GET ALONG WITH OTHER PEOPLE: SOMEWHAT DIFFICULT
3. TROUBLE FALLING OR STAYING ASLEEP: NEARLY EVERY DAY
5. POOR APPETITE OR OVEREATING: NOT AT ALL
6. FEELING BAD ABOUT YOURSELF - OR THAT YOU ARE A FAILURE OR HAVE LET YOURSELF OR YOUR FAMILY DOWN: NEARLY EVERY DAY
SUM OF ALL RESPONSES TO PHQ QUESTIONS 1-9: 16
4. FEELING TIRED OR HAVING LITTLE ENERGY: NEARLY EVERY DAY
SUM OF ALL RESPONSES TO PHQ QUESTIONS 1-9: 16
7. TROUBLE CONCENTRATING ON THINGS, SUCH AS READING THE NEWSPAPER OR WATCHING TELEVISION: NEARLY EVERY DAY
SUM OF ALL RESPONSES TO PHQ QUESTIONS 1-9: 16
8. MOVING OR SPEAKING SO SLOWLY THAT OTHER PEOPLE COULD HAVE NOTICED. OR THE OPPOSITE, BEING SO FIGETY OR RESTLESS THAT YOU HAVE BEEN MOVING AROUND A LOT MORE THAN USUAL: SEVERAL DAYS
9. THOUGHTS THAT YOU WOULD BE BETTER OFF DEAD, OR OF HURTING YOURSELF: NOT AT ALL
SUM OF ALL RESPONSES TO PHQ9 QUESTIONS 1 & 2: 3
SUM OF ALL RESPONSES TO PHQ QUESTIONS 1-9: 16
2. FEELING DOWN, DEPRESSED OR HOPELESS: NEARLY EVERY DAY

## 2024-11-08 ASSESSMENT — ANXIETY QUESTIONNAIRES
5. BEING SO RESTLESS THAT IT IS HARD TO SIT STILL: NEARLY EVERY DAY
3. WORRYING TOO MUCH ABOUT DIFFERENT THINGS: NEARLY EVERY DAY
1. FEELING NERVOUS, ANXIOUS, OR ON EDGE: NEARLY EVERY DAY
2. NOT BEING ABLE TO STOP OR CONTROL WORRYING: NEARLY EVERY DAY
4. TROUBLE RELAXING: NEARLY EVERY DAY
GAD7 TOTAL SCORE: 21
7. FEELING AFRAID AS IF SOMETHING AWFUL MIGHT HAPPEN: NEARLY EVERY DAY
IF YOU CHECKED OFF ANY PROBLEMS ON THIS QUESTIONNAIRE, HOW DIFFICULT HAVE THESE PROBLEMS MADE IT FOR YOU TO DO YOUR WORK, TAKE CARE OF THINGS AT HOME, OR GET ALONG WITH OTHER PEOPLE: EXTREMELY DIFFICULT
6. BECOMING EASILY ANNOYED OR IRRITABLE: NEARLY EVERY DAY

## 2024-11-08 NOTE — TELEPHONE ENCOUNTER
Nika called from [facility] home health is requesting a call back to receive a verbal order for Home Health orders [phone]

## 2024-11-08 NOTE — PROGRESS NOTES
BMI 24.13 kg/m²   General Appearance: alert and oriented to person, place and time, well-developed and well-nourished, in no acute distress and pale  Skin: warm and dry, no rash or erythema  Head: normocephalic and atraumatic  Neck: neck supple and non tender without mass, no thyromegaly or thyroid nodules, no cervical lymphadenopathy   Pulmonary/Chest: clear to auscultation bilaterally- no wheezes, rales or rhonchi, normal air movement, no respiratory distress  Cardiovascular: normal rate and normal S1 and S2  Abdomen: soft, non-tender, non-distended, normal bowel sounds, no masses or organomegaly  Extremities: no cyanosis      An electronic signature was used to authenticate this note.  --Emilia Cotter, TAY - CNP

## 2024-11-10 ENCOUNTER — HOSPITAL ENCOUNTER (EMERGENCY)
Facility: HOSPITAL | Age: 64
Discharge: HOME | DRG: 074 | End: 2024-11-13
Payer: MEDICARE

## 2024-11-10 ENCOUNTER — APPOINTMENT (OUTPATIENT)
Facility: HOSPITAL | Age: 64
DRG: 074 | End: 2024-11-10
Payer: MEDICARE

## 2024-11-10 ENCOUNTER — HOSPITAL ENCOUNTER (INPATIENT)
Facility: HOSPITAL | Age: 64
LOS: 1 days | Discharge: HOME | DRG: 074 | End: 2024-11-11
Attending: EMERGENCY MEDICINE | Admitting: HOSPITALIST
Payer: MEDICARE

## 2024-11-10 DIAGNOSIS — G45.9 TIA (TRANSIENT ISCHEMIC ATTACK): Primary | ICD-10-CM

## 2024-11-10 DIAGNOSIS — G51.0 FACIAL PARALYSIS ON LEFT SIDE: ICD-10-CM

## 2024-11-10 LAB
ALBUMIN SERPL-MCNC: 2.5 G/DL (ref 3.5–5)
ALBUMIN/GLOB SERPL: 0.7 (ref 1.1–2.2)
ALP SERPL-CCNC: 118 U/L (ref 45–117)
ALT SERPL-CCNC: 53 U/L (ref 12–78)
ANION GAP SERPL CALC-SCNC: 5 MMOL/L (ref 2–12)
AST SERPL-CCNC: 25 U/L (ref 15–37)
BACTERIA SPEC CULT: NORMAL
BACTERIA SPEC CULT: NORMAL
BASOPHILS # BLD: 0 K/UL (ref 0–0.1)
BASOPHILS NFR BLD: 0 % (ref 0–1)
BILIRUB SERPL-MCNC: 0.5 MG/DL (ref 0.2–1)
BUN SERPL-MCNC: 20 MG/DL (ref 6–20)
BUN/CREAT SERPL: 19 (ref 12–20)
CALCIUM SERPL-MCNC: 8.1 MG/DL (ref 8.5–10.1)
CHLORIDE SERPL-SCNC: 102 MMOL/L (ref 97–108)
CO2 SERPL-SCNC: 27 MMOL/L (ref 21–32)
CREAT SERPL-MCNC: 1.04 MG/DL (ref 0.7–1.3)
DIFFERENTIAL METHOD BLD: ABNORMAL
EOSINOPHIL # BLD: 0 K/UL (ref 0–0.4)
EOSINOPHIL NFR BLD: 0 % (ref 0–7)
ERYTHROCYTE [DISTWIDTH] IN BLOOD BY AUTOMATED COUNT: 14.4 % (ref 11.5–14.5)
GLOBULIN SER CALC-MCNC: 3.6 G/DL (ref 2–4)
GLUCOSE BLD STRIP.AUTO-MCNC: 155 MG/DL (ref 65–117)
GLUCOSE SERPL-MCNC: 136 MG/DL (ref 65–100)
HCT VFR BLD AUTO: 37.4 % (ref 36.6–50.3)
HGB BLD-MCNC: 12.7 G/DL (ref 12.1–17)
IMM GRANULOCYTES # BLD AUTO: 0.1 K/UL (ref 0–0.04)
IMM GRANULOCYTES NFR BLD AUTO: 1 % (ref 0–0.5)
INR PPP: 1 (ref 0.9–1.1)
LYMPHOCYTES # BLD: 0.4 K/UL (ref 0.8–3.5)
LYMPHOCYTES NFR BLD: 4 % (ref 12–49)
MCH RBC QN AUTO: 31 PG (ref 26–34)
MCHC RBC AUTO-ENTMCNC: 34 G/DL (ref 30–36.5)
MCV RBC AUTO: 91.2 FL (ref 80–99)
MONOCYTES # BLD: 0.1 K/UL (ref 0–1)
MONOCYTES NFR BLD: 1 % (ref 5–13)
NEUTS SEG # BLD: 9.2 K/UL (ref 1.8–8)
NEUTS SEG NFR BLD: 94 % (ref 32–75)
NRBC # BLD: 0 K/UL (ref 0–0.01)
NRBC BLD-RTO: 0 PER 100 WBC
PLATELET # BLD AUTO: 208 K/UL (ref 150–400)
PMV BLD AUTO: 8.8 FL (ref 8.9–12.9)
POTASSIUM SERPL-SCNC: 4.1 MMOL/L (ref 3.5–5.1)
PROT SERPL-MCNC: 6.1 G/DL (ref 6.4–8.2)
PROTHROMBIN TIME: 10.1 SEC (ref 9–11.1)
RBC # BLD AUTO: 4.1 M/UL (ref 4.1–5.7)
RBC MORPH BLD: ABNORMAL
SERVICE CMNT-IMP: ABNORMAL
SERVICE CMNT-IMP: NORMAL
SERVICE CMNT-IMP: NORMAL
SODIUM SERPL-SCNC: 134 MMOL/L (ref 136–145)
TROPONIN I SERPL HS-MCNC: 18 NG/L (ref 0–76)
WBC # BLD AUTO: 9.8 K/UL (ref 4.1–11.1)

## 2024-11-10 PROCEDURE — 70551 MRI BRAIN STEM W/O DYE: CPT

## 2024-11-10 PROCEDURE — 1100000000 HC RM PRIVATE

## 2024-11-10 PROCEDURE — 84484 ASSAY OF TROPONIN QUANT: CPT

## 2024-11-10 PROCEDURE — 94761 N-INVAS EAR/PLS OXIMETRY MLT: CPT

## 2024-11-10 PROCEDURE — 82962 GLUCOSE BLOOD TEST: CPT

## 2024-11-10 PROCEDURE — 85025 COMPLETE CBC W/AUTO DIFF WBC: CPT

## 2024-11-10 PROCEDURE — 99285 EMERGENCY DEPT VISIT HI MDM: CPT

## 2024-11-10 PROCEDURE — 6370000000 HC RX 637 (ALT 250 FOR IP): Performed by: HOSPITALIST

## 2024-11-10 PROCEDURE — 70496 CT ANGIOGRAPHY HEAD: CPT

## 2024-11-10 PROCEDURE — 85610 PROTHROMBIN TIME: CPT

## 2024-11-10 PROCEDURE — 2580000003 HC RX 258: Performed by: HOSPITALIST

## 2024-11-10 PROCEDURE — 70450 CT HEAD/BRAIN W/O DYE: CPT

## 2024-11-10 PROCEDURE — 80053 COMPREHEN METABOLIC PANEL: CPT

## 2024-11-10 PROCEDURE — 4A03X5D MEASUREMENT OF ARTERIAL FLOW, INTRACRANIAL, EXTERNAL APPROACH: ICD-10-PCS | Performed by: STUDENT IN AN ORGANIZED HEALTH CARE EDUCATION/TRAINING PROGRAM

## 2024-11-10 PROCEDURE — 93005 ELECTROCARDIOGRAM TRACING: CPT | Performed by: STUDENT IN AN ORGANIZED HEALTH CARE EDUCATION/TRAINING PROGRAM

## 2024-11-10 PROCEDURE — 6360000004 HC RX CONTRAST MEDICATION: Performed by: EMERGENCY MEDICINE

## 2024-11-10 PROCEDURE — 0042T CT BRAIN PERFUSION: CPT

## 2024-11-10 PROCEDURE — 36415 COLL VENOUS BLD VENIPUNCTURE: CPT

## 2024-11-10 RX ORDER — SODIUM CHLORIDE 9 MG/ML
INJECTION, SOLUTION INTRAVENOUS PRN
Status: DISCONTINUED | OUTPATIENT
Start: 2024-11-10 | End: 2024-11-11 | Stop reason: HOSPADM

## 2024-11-10 RX ORDER — VALACYCLOVIR HYDROCHLORIDE 1 G/1
1000 TABLET, FILM COATED ORAL 2 TIMES DAILY
COMMUNITY
End: 2024-11-11 | Stop reason: HOSPADM

## 2024-11-10 RX ORDER — PREDNISONE 20 MG/1
80 TABLET ORAL
Status: DISCONTINUED | OUTPATIENT
Start: 2024-11-11 | End: 2024-11-11 | Stop reason: HOSPADM

## 2024-11-10 RX ORDER — SULFAMETHOXAZOLE AND TRIMETHOPRIM 800; 160 MG/1; MG/1
1 TABLET ORAL
Status: DISCONTINUED | OUTPATIENT
Start: 2024-11-11 | End: 2024-11-11 | Stop reason: HOSPADM

## 2024-11-10 RX ORDER — ONDANSETRON 2 MG/ML
4 INJECTION INTRAMUSCULAR; INTRAVENOUS EVERY 6 HOURS PRN
Status: DISCONTINUED | OUTPATIENT
Start: 2024-11-10 | End: 2024-11-11 | Stop reason: HOSPADM

## 2024-11-10 RX ORDER — SODIUM CHLORIDE 0.9 % (FLUSH) 0.9 %
5-40 SYRINGE (ML) INJECTION EVERY 12 HOURS SCHEDULED
Status: DISCONTINUED | OUTPATIENT
Start: 2024-11-10 | End: 2024-11-11 | Stop reason: HOSPADM

## 2024-11-10 RX ORDER — BUTALBITAL, ACETAMINOPHEN AND CAFFEINE 50; 325; 40 MG/1; MG/1; MG/1
1 TABLET ORAL EVERY 6 HOURS PRN
Status: DISCONTINUED | OUTPATIENT
Start: 2024-11-10 | End: 2024-11-11 | Stop reason: HOSPADM

## 2024-11-10 RX ORDER — PREDNISONE 20 MG/1
80 TABLET ORAL
Status: DISCONTINUED | OUTPATIENT
Start: 2024-11-10 | End: 2024-11-10

## 2024-11-10 RX ORDER — FENTANYL 25 UG/1
1 PATCH TRANSDERMAL
Status: DISCONTINUED | OUTPATIENT
Start: 2024-11-10 | End: 2024-11-11 | Stop reason: HOSPADM

## 2024-11-10 RX ORDER — LOSARTAN POTASSIUM 50 MG/1
100 TABLET ORAL DAILY
Status: DISCONTINUED | OUTPATIENT
Start: 2024-11-11 | End: 2024-11-11 | Stop reason: HOSPADM

## 2024-11-10 RX ORDER — TIZANIDINE 2 MG/1
4 TABLET ORAL EVERY 8 HOURS PRN
Status: DISCONTINUED | OUTPATIENT
Start: 2024-11-10 | End: 2024-11-11 | Stop reason: HOSPADM

## 2024-11-10 RX ORDER — ATORVASTATIN CALCIUM 20 MG/1
20 TABLET, FILM COATED ORAL NIGHTLY
Status: DISCONTINUED | OUTPATIENT
Start: 2024-11-10 | End: 2024-11-11 | Stop reason: HOSPADM

## 2024-11-10 RX ORDER — BUTALBITAL, ACETAMINOPHEN AND CAFFEINE 50; 325; 40 MG/1; MG/1; MG/1
1 TABLET ORAL
Status: DISCONTINUED | OUTPATIENT
Start: 2024-11-10 | End: 2024-11-10

## 2024-11-10 RX ORDER — IOPAMIDOL 755 MG/ML
140 INJECTION, SOLUTION INTRAVASCULAR
Status: COMPLETED | OUTPATIENT
Start: 2024-11-10 | End: 2024-11-10

## 2024-11-10 RX ORDER — POLYETHYLENE GLYCOL 3350 17 G/17G
17 POWDER, FOR SOLUTION ORAL DAILY PRN
Status: DISCONTINUED | OUTPATIENT
Start: 2024-11-10 | End: 2024-11-11 | Stop reason: HOSPADM

## 2024-11-10 RX ORDER — PANTOPRAZOLE SODIUM 40 MG/1
40 TABLET, DELAYED RELEASE ORAL
Status: DISCONTINUED | OUTPATIENT
Start: 2024-11-11 | End: 2024-11-11 | Stop reason: HOSPADM

## 2024-11-10 RX ORDER — OXYCODONE HYDROCHLORIDE 5 MG/1
10 TABLET ORAL EVERY 6 HOURS PRN
Status: DISCONTINUED | OUTPATIENT
Start: 2024-11-10 | End: 2024-11-11 | Stop reason: HOSPADM

## 2024-11-10 RX ORDER — PROPRANOLOL HYDROCHLORIDE 10 MG/1
10 TABLET ORAL 2 TIMES DAILY
Status: DISCONTINUED | OUTPATIENT
Start: 2024-11-10 | End: 2024-11-11 | Stop reason: HOSPADM

## 2024-11-10 RX ORDER — VALACYCLOVIR HYDROCHLORIDE 500 MG/1
1000 TABLET, FILM COATED ORAL 3 TIMES DAILY
Status: DISCONTINUED | OUTPATIENT
Start: 2024-11-10 | End: 2024-11-11 | Stop reason: HOSPADM

## 2024-11-10 RX ORDER — ASPIRIN 81 MG/1
81 TABLET, CHEWABLE ORAL DAILY
Status: DISCONTINUED | OUTPATIENT
Start: 2024-11-10 | End: 2024-11-11 | Stop reason: HOSPADM

## 2024-11-10 RX ORDER — ONDANSETRON 4 MG/1
4 TABLET, ORALLY DISINTEGRATING ORAL EVERY 8 HOURS PRN
Status: DISCONTINUED | OUTPATIENT
Start: 2024-11-10 | End: 2024-11-11 | Stop reason: HOSPADM

## 2024-11-10 RX ORDER — BUTALBITAL, ACETAMINOPHEN AND CAFFEINE 50; 325; 40 MG/1; MG/1; MG/1
1 TABLET ORAL
Status: COMPLETED | OUTPATIENT
Start: 2024-11-10 | End: 2024-11-10

## 2024-11-10 RX ORDER — BUTALBITAL, ACETAMINOPHEN AND CAFFEINE 300; 40; 50 MG/1; MG/1; MG/1
1 CAPSULE ORAL EVERY 6 HOURS PRN
Status: DISCONTINUED | OUTPATIENT
Start: 2024-11-10 | End: 2024-11-10

## 2024-11-10 RX ORDER — SODIUM CHLORIDE 0.9 % (FLUSH) 0.9 %
5-40 SYRINGE (ML) INJECTION PRN
Status: DISCONTINUED | OUTPATIENT
Start: 2024-11-10 | End: 2024-11-11 | Stop reason: HOSPADM

## 2024-11-10 RX ADMIN — IOPAMIDOL 140 ML: 755 INJECTION, SOLUTION INTRAVENOUS at 14:37

## 2024-11-10 RX ADMIN — ASPIRIN 81 MG: 81 TABLET, CHEWABLE ORAL at 20:35

## 2024-11-10 RX ADMIN — VALACYCLOVIR HYDROCHLORIDE 1000 MG: 500 TABLET, FILM COATED ORAL at 21:26

## 2024-11-10 RX ADMIN — ATORVASTATIN CALCIUM 20 MG: 20 TABLET, FILM COATED ORAL at 20:24

## 2024-11-10 RX ADMIN — BUTALBITAL, ACETAMINOPHEN, AND CAFFEINE 1 TABLET: 50; 325; 40 TABLET ORAL at 20:25

## 2024-11-10 RX ADMIN — SODIUM CHLORIDE, PRESERVATIVE FREE 10 ML: 5 INJECTION INTRAVENOUS at 21:42

## 2024-11-10 RX ADMIN — APIXABAN 2.5 MG: 2.5 TABLET, FILM COATED ORAL at 20:24

## 2024-11-10 RX ADMIN — TIZANIDINE 4 MG: 2 TABLET ORAL at 20:24

## 2024-11-10 ASSESSMENT — PAIN DESCRIPTION - ONSET: ONSET: PROGRESSIVE

## 2024-11-10 ASSESSMENT — PAIN - FUNCTIONAL ASSESSMENT
PAIN_FUNCTIONAL_ASSESSMENT: ACTIVITIES ARE NOT PREVENTED
PAIN_FUNCTIONAL_ASSESSMENT: 0-10

## 2024-11-10 ASSESSMENT — PAIN DESCRIPTION - PAIN TYPE
TYPE: CHRONIC PAIN
TYPE: ACUTE PAIN

## 2024-11-10 ASSESSMENT — LIFESTYLE VARIABLES
HOW OFTEN DO YOU HAVE A DRINK CONTAINING ALCOHOL: NEVER
HOW MANY STANDARD DRINKS CONTAINING ALCOHOL DO YOU HAVE ON A TYPICAL DAY: PATIENT DOES NOT DRINK

## 2024-11-10 ASSESSMENT — PAIN DESCRIPTION - DESCRIPTORS
DESCRIPTORS: STABBING
DESCRIPTORS: ACHING

## 2024-11-10 ASSESSMENT — PAIN DESCRIPTION - ORIENTATION
ORIENTATION: LEFT;LOWER
ORIENTATION: RIGHT;LEFT;ANTERIOR

## 2024-11-10 ASSESSMENT — PAIN SCALES - GENERAL
PAINLEVEL_OUTOF10: 7
PAINLEVEL_OUTOF10: 7
PAINLEVEL_OUTOF10: 5

## 2024-11-10 ASSESSMENT — PAIN DESCRIPTION - LOCATION
LOCATION: HEAD
LOCATION: HEAD;BACK

## 2024-11-10 ASSESSMENT — PAIN DESCRIPTION - FREQUENCY: FREQUENCY: INTERMITTENT

## 2024-11-10 NOTE — ED TRIAGE NOTES
Patient in to ED with c/o waking up with headache and 0930 slurred speech, left sided facial droop, and decreased sensation. Reports sight difference in feeling to left hand as well . No weakness noted in arms or legs.

## 2024-11-10 NOTE — ED NOTES
Code Stroke Level: LVL 1  Signs and symptoms: HA, Slurred Speech, Blurry Vision, Facial Droop, Decreased Sensation   Code Stroke activation time: 1258  Provider at bedside time:  1258  VAN score: Positive  Last Known Well (Time): 0800  Blood Glucose Result/Time: 155   Blood Pressure: 157/85  Anticoagulants (List medications): Eliquis

## 2024-11-10 NOTE — ED NOTES
Stroke Education provided to {Information source:29727} and the following topics were discussed    1. Patients personal risk factors for stroke are {risk factors for stroke:23982:a}    2. Warning signs of Stroke:        * Sudden numbness or weakness of the face, arm or leg, especially on one side of          The body            * Sudden confusion, trouble speaking or understanding        * Sudden trouble seeing in one or both eyes        * Sudden trouble walking, dizziness, loss of balance or coordination        * Sudden severe headache with no known cause      3. Importance of activation Emergency Medical Services ( 9-1-1 ) immediately if experience any warning signs of stroke.    4. Be sure and schedule a follow-up appointment with your primary care doctor or any specialists as instructed.     5. You must take medicine every day to treat your risk factors for stroke.  Be sure to take your medicines exactly as your doctor tells you: no more, no less.  Know what your medicines are for , what they do.  Anti-thrombotics /anticoagulants can help prevent strokes.  You are taking the following medicine(s)  ***     6.  Smoking and second-hand smoke greatly increase your risk of stroke, cardiovascular disease and death. Smoking history {Smokinghx:35305}    7. Information provided was {[facility] IP STROKE EDUCATION INFORMATION PROVIDED WAS:15807}    8. Documentation of teaching completed in Patient Education Activity and on Care Plan with teaching response noted?  {yes no:28015}

## 2024-11-10 NOTE — ED PROVIDER NOTES
arm and leg felt different.  He was able to get into the car to come to the ED.  Initially denied any left lower extremity weakness and only stated he had tingling in his left hand.  He did have some tingling of the left side of his tongue.  Walk around a little bit patient was recently discharged from the hospital here on November 5 after being admitted for an intractable headache and had a negative neurology workup including negative CT scans and a negative MRI.  Given patient's symptoms, level 1 code stroke was called in triage.    Differentials include atypical migraine, Bell's palsy, CVA.  Head CT was negative.  No indication for TNK per neurology given that pt woke up with his symptoms.  Facial paralysis is consistent with Bell's palsy, however given that he has sensory deficits  on his left side and some weakness of his left lower extremity, neurology recommended admission for stroke workup.    Total critical care time spent exclusive of procedures:  30 minutes.       PROCEDURES:  Unless otherwise noted below, none     Procedures      FINAL IMPRESSION      1. Facial paralysis on left side          DISPOSITION/PLAN   Admit to hospitalist      PATIENT REFERRED TO:  No follow-up provider specified.    DISCHARGE MEDICATIONS:  New Prescriptions    No medications on file         (Please note that portions of this note were completed with a voice recognition program.  Efforts were made to edit the dictations but occasionally words are mis-transcribed.)    Shantell Iyer DO (electronically signed)  Emergency Attending Physician / Physician Assistant / Nurse Practitioner             Shantell Iyer DO  11/10/24 6240

## 2024-11-10 NOTE — H&P
times daily 10/20/24  Yes Parish Argueta MD   atorvastatin (LIPITOR) 20 MG tablet Take 1 tablet by mouth nightly 10/20/24  Yes Parish Argueta MD   losartan (COZAAR) 100 MG tablet Take 1 tablet by mouth daily 10/21/24  Yes Parish Argueta MD   sulfamethoxazole-trimethoprim (BACTRIM DS;SEPTRA DS) 800-160 MG per tablet Take 1 tablet by mouth three times a week MWF 10/21/24 3/10/25 Yes Parish Argueta MD   oxyCODONE (OXY-IR) 10 MG immediate release tablet Take 1 tablet by mouth every 6 hours as needed for Pain (for severe pain) for up to 5 days. Intended supply: 3 days. Take lowest dose possible to manage pain Max Daily Amount: 40 mg 11/5/24 11/10/24  Belkys Kan MD       REVIEW OF SYSTEMS:  See HPI for details  General:  negative for fever, chills, sweats, weakness, weight loss  Eyes: negative for blurred vision, eye pain, loss of vision, diplopia  Ear Nose and Throat: negative for rhinorrhea, pharyngitis, otalgia, tinnitus, speech or swallowing difficulties  Respiratory:  negative for pleuritic pain, cough, sputum production, wheezing, SOB, MATA  Cardiology:  negative for chest pain, palpitations, orthopnea, PND, edema, syncope   Gastrointestinal: negative for abdominal pain, N/V, dysphagia, change in bowel habits, bleeding  Genitourinary: negative for frequency, urgency, dysuria, hematuria, incontinence  Muskuloskeletal : negative for arthralgia, myalgia  Hematology: negative for easy bruising, bleeding, lymphadenopathy  Dermatological: negative for rash, ulceration, mole change, new lesion  Endocrine: negative for hot flashes or polydipsia  Neurological: Left ischial droop positive.  Psychological: negative for anxiety, depression, agitation      Objective:   VITALS:    Vitals:    11/10/24 1304   BP: (!) 157/85   Pulse: 80   Resp: 17   Temp: 97.3 °F (36.3 °C)   SpO2: 97%     PHYSICAL EXAM:    Physical Exam:    Gen: Well-developed, well-nourished, in no acute distress  HEENT:  Pink conjunctivae, PERRL, hearing

## 2024-11-10 NOTE — ED NOTES
Stroke Education provided to patient and the following topics were discussed    1. Patients personal risk factors for stroke are hypertension    2. Warning signs of Stroke:        * Sudden numbness or weakness of the face, arm or leg, especially on one side of          The body            * Sudden confusion, trouble speaking or understanding        * Sudden trouble seeing in one or both eyes        * Sudden trouble walking, dizziness, loss of balance or coordination        * Sudden severe headache with no known cause      3. Importance of activation Emergency Medical Services ( 9-1-1 ) immediately if experience any warning signs of stroke.    4. Be sure and schedule a follow-up appointment with your primary care doctor or any specialists as instructed.     5. You must take medicine every day to treat your risk factors for stroke.  Be sure to take your medicines exactly as your doctor tells you: no more, no less.  Know what your medicines are for , what they do.  Anti-thrombotics /anticoagulants can help prevent strokes.  You are taking the following medicine(s)  Eliquis     6.  Smoking and second-hand smoke greatly increase your risk of stroke, cardiovascular disease and death. Smoking history never    7. Information provided was [facility] Stroke Education Binder or Verbal Education    8. Documentation of teaching completed in Patient Education Activity and on Care Plan with teaching response noted?  yes

## 2024-11-11 VITALS
BODY MASS INDEX: 24.16 KG/M2 | DIASTOLIC BLOOD PRESSURE: 72 MMHG | HEIGHT: 69 IN | RESPIRATION RATE: 18 BRPM | TEMPERATURE: 97.8 F | SYSTOLIC BLOOD PRESSURE: 133 MMHG | WEIGHT: 163.14 LBS | HEART RATE: 72 BPM | OXYGEN SATURATION: 95 %

## 2024-11-11 PROBLEM — G45.9 TIA (TRANSIENT ISCHEMIC ATTACK): Status: RESOLVED | Noted: 2024-11-10 | Resolved: 2024-11-11

## 2024-11-11 LAB
CHOLEST SERPL-MCNC: 208 MG/DL
ERYTHROCYTE [DISTWIDTH] IN BLOOD BY AUTOMATED COUNT: 14.4 % (ref 11.5–14.5)
EST. AVERAGE GLUCOSE BLD GHB EST-MCNC: 123 MG/DL
HBA1C MFR BLD: 5.9 % (ref 4–5.6)
HCT VFR BLD AUTO: 33.5 % (ref 36.6–50.3)
HDLC SERPL-MCNC: 54 MG/DL
HDLC SERPL: 3.9 (ref 0–5)
HGB BLD-MCNC: 11.4 G/DL (ref 12.1–17)
LDLC SERPL CALC-MCNC: 130.2 MG/DL (ref 0–100)
MCH RBC QN AUTO: 30.6 PG (ref 26–34)
MCHC RBC AUTO-ENTMCNC: 34 G/DL (ref 30–36.5)
MCV RBC AUTO: 90.1 FL (ref 80–99)
NRBC # BLD: 0 K/UL (ref 0–0.01)
NRBC BLD-RTO: 0 PER 100 WBC
PLATELET # BLD AUTO: 172 K/UL (ref 150–400)
PMV BLD AUTO: 9 FL (ref 8.9–12.9)
RBC # BLD AUTO: 3.72 M/UL (ref 4.1–5.7)
TRIGL SERPL-MCNC: 119 MG/DL
VLDLC SERPL CALC-MCNC: 23.8 MG/DL
WBC # BLD AUTO: 7.6 K/UL (ref 4.1–11.1)

## 2024-11-11 PROCEDURE — 97162 PT EVAL MOD COMPLEX 30 MIN: CPT

## 2024-11-11 PROCEDURE — 6370000000 HC RX 637 (ALT 250 FOR IP): Performed by: HOSPITALIST

## 2024-11-11 PROCEDURE — 97530 THERAPEUTIC ACTIVITIES: CPT

## 2024-11-11 PROCEDURE — 83036 HEMOGLOBIN GLYCOSYLATED A1C: CPT

## 2024-11-11 PROCEDURE — 94761 N-INVAS EAR/PLS OXIMETRY MLT: CPT

## 2024-11-11 PROCEDURE — 80061 LIPID PANEL: CPT

## 2024-11-11 PROCEDURE — 97116 GAIT TRAINING THERAPY: CPT

## 2024-11-11 PROCEDURE — 97165 OT EVAL LOW COMPLEX 30 MIN: CPT

## 2024-11-11 PROCEDURE — 36415 COLL VENOUS BLD VENIPUNCTURE: CPT

## 2024-11-11 PROCEDURE — 85027 COMPLETE CBC AUTOMATED: CPT

## 2024-11-11 PROCEDURE — 2580000003 HC RX 258: Performed by: HOSPITALIST

## 2024-11-11 PROCEDURE — 6370000000 HC RX 637 (ALT 250 FOR IP): Performed by: NURSE PRACTITIONER

## 2024-11-11 PROCEDURE — 99223 1ST HOSP IP/OBS HIGH 75: CPT | Performed by: NURSE PRACTITIONER

## 2024-11-11 RX ORDER — LAMOTRIGINE 25 MG/1
25 TABLET ORAL DAILY
Qty: 30 TABLET | Refills: 3 | Status: SHIPPED | OUTPATIENT
Start: 2024-11-11

## 2024-11-11 RX ORDER — VALACYCLOVIR HYDROCHLORIDE 1 G/1
1000 TABLET, FILM COATED ORAL 3 TIMES DAILY
Qty: 19 TABLET | Refills: 0 | Status: SHIPPED | OUTPATIENT
Start: 2024-11-11 | End: 2024-11-18

## 2024-11-11 RX ORDER — LAMOTRIGINE 25 MG/1
25 TABLET ORAL DAILY
Status: DISCONTINUED | OUTPATIENT
Start: 2024-11-11 | End: 2024-11-11 | Stop reason: HOSPADM

## 2024-11-11 RX ADMIN — VALACYCLOVIR HYDROCHLORIDE 1000 MG: 500 TABLET, FILM COATED ORAL at 09:42

## 2024-11-11 RX ADMIN — SULFAMETHOXAZOLE AND TRIMETHOPRIM 1 TABLET: 800; 160 TABLET ORAL at 09:41

## 2024-11-11 RX ADMIN — PROPRANOLOL HYDROCHLORIDE 10 MG: 10 TABLET ORAL at 09:40

## 2024-11-11 RX ADMIN — APIXABAN 2.5 MG: 2.5 TABLET, FILM COATED ORAL at 09:40

## 2024-11-11 RX ADMIN — LOSARTAN POTASSIUM 100 MG: 50 TABLET, FILM COATED ORAL at 09:40

## 2024-11-11 RX ADMIN — ASPIRIN 81 MG: 81 TABLET, CHEWABLE ORAL at 09:40

## 2024-11-11 RX ADMIN — LAMOTRIGINE 25 MG: 25 TABLET ORAL at 11:56

## 2024-11-11 RX ADMIN — BUTALBITAL, ACETAMINOPHEN, AND CAFFEINE 1 TABLET: 50; 325; 40 TABLET ORAL at 02:57

## 2024-11-11 RX ADMIN — PREDNISONE 80 MG: 20 TABLET ORAL at 09:40

## 2024-11-11 RX ADMIN — SODIUM CHLORIDE, PRESERVATIVE FREE 5 ML: 5 INJECTION INTRAVENOUS at 09:46

## 2024-11-11 ASSESSMENT — PAIN SCALES - GENERAL
PAINLEVEL_OUTOF10: 4
PAINLEVEL_OUTOF10: 9
PAINLEVEL_OUTOF10: 8

## 2024-11-11 ASSESSMENT — PAIN DESCRIPTION - LOCATION
LOCATION: HEAD

## 2024-11-11 ASSESSMENT — PAIN DESCRIPTION - DESCRIPTORS: DESCRIPTORS: ACHING

## 2024-11-11 NOTE — DISCHARGE INSTRUCTIONS
HOSPITALIST DISCHARGE INSTRUCTIONS  NAME:  Zoltan Fontana Jr   :  1960   MRN:  [id number]     Date/Time:  2024 10:59 AM    ADMIT DATE: 11/10/2024     DISCHARGE DATE: 2024     DISCHARGE DIAGNOSIS:  Trigeminal Neuralgia  [place] palsy     DISCHARGE INSTRUCTIONS:  Thank you for allowing us to participate in your care. Your discharging Hospitalist is Parish Argueta MD. You were admitted for evaluation and treatment of the above.     Trigeminal Neuralgia    -- Acute stroke was ruled out, CT/CTA/CTP/MRI were negative  -- Neurology has evaluated the patient and is started on Lamictal p.o. for trigeminal neuralgia and migraine    [place] palsy   -- Acute CVA ruled out  --Continue on steroids and Valtrex  Membranous glomerulopathy  Patient is on prednisone 80 mg daily.  Continue Bactrim DS  for prophylaxis.  Patient is already on Eliquis  Continue Lipitor       Hypertension  Continue losartan and propranolol.      Chronic low back pain  Continue Tylenol and tizanidine.     GERD  Continue PPI.    MEDICATIONS:    It is important that you take the medication exactly as they are prescribed.   Keep your medication in the bottles provided by the pharmacist and keep a list of the medication names, dosages, and times to be taken in your wallet.   Do not take other medications without consulting your doctor.             If you experience any of the following symptoms then please call your primary care physician or return to the emergency room if you cannot get hold of your doctor:  Fever, chills, nausea, vomiting, diarrhea, change in mentation, falling, bleeding, shortness of breath    Follow Up:  Please call the below provider to arrange hospital follow up appointment      Marina Mel[place]  [address] [facility] [address] [phone]    Call        For questions regarding your Hospitalization or to contact the Hospital Medicine team, please call [phone])

## 2024-11-11 NOTE — PLAN OF CARE
et al, 2009; n = 1000 [place] elderly evaluated with ABC, FITZ, ADL, and IADL)  · Mean FITZ score for males aged 65-79 years = 26.21(3.40)  · Mean FITZ score for females age 65-79 years = 25.16(4.30)  · Mean FITZ score for males over 80 years = 23.29(6.02)  · Mean FITZ score for females over 80 years = 17.20(8.32)                                                                                                                                                                                                                                 Activity Tolerance:   Fair     After treatment:   Call bell within reach, Caregiver / family present, and on ED stretcher in ED    COMMUNICATION/EDUCATION:   The patient's plan of care was discussed with: occupational therapist and registered nurse    Patient Education  Education Given To: Patient  Education Provided: Plan of Care  Education Method: Verbal;Demonstration  Barriers to Learning: None  Education Outcome: Verbalized understanding    Thank you for this referral.  Lisa Lawson PT,DPT,NCS,CLT  Minutes: 24      Physical Therapy Evaluation Charge Determination   History Examination Presentation Decision-Making   HIGH Complexity :3+ comorbidities / personal factors will impact the outcome/ POC  HIGH Complexity : 4+ Standardized tests and measures addressing body structure, function, activity limitation and / or participation in recreation  MEDIUM Complexity : Evolving with changing characteristics  Tinetti Gait and Balance  MEDIUM   Based on the above components, the patient evaluation is determined to be of the following complexity level: Medium

## 2024-11-11 NOTE — DISCHARGE SUMMARY
Hospitalist Discharge Summary     Patient ID:  Zoltan Fontana Jr  [id number]  64 y.o.  1960    Admit date: 11/10/2024    Discharge date and time: 11/11/2024    Admission Diagnoses: TIA (transient ischemic attack) [G45.9]    Discharge Diagnoses:    Principal Problem (Resolved):    TIA (transient ischemic attack)  Active Problems:    * No active hospital problems. *         Hospital Course:     Trigeminal Neuralgia    -- Acute stroke was ruled out, CT/CTA/CTP/MRI were negative  -- Neurology has evaluated the patient and is started on Lamictal p.o. for trigeminal neuralgia and migraine    [place] palsy   -- Acute CVA ruled out  --Continue on steroids and Valtrex  -- eye protector patch discussed with the patient and discussed with the patient    Membranous glomerulopathy  Patient is on prednisone 80 mg daily.  Continue Bactrim DS Monday Wednesday Friday for prophylaxis.  Patient is already on Eliquis  Continue Lipitor       Hypertension  Continue losartan and propranolol.      Chronic low back pain  Continue Tylenol and tizanidine.     GERD  Continue PPI.    Imaging  MRI brain without contrast    Result Date: 11/10/2024  No significant abnormality or acute process. Electronically signed by Khoa Marrero    CTA HEAD NECK W CONTRAST    Result Date: 11/10/2024  No large vessel occlusion or hemodynamically significant carotid stenosis. No perfusion abnormality. Electronically signed by PAGE FERNANDEZ    CT BRAIN PERFUSION    Result Date: 11/10/2024  No large vessel occlusion or hemodynamically significant carotid stenosis. No perfusion abnormality. Electronically signed by PAGE FERNANDEZ    CT HEAD WO CONTRAST    Result Date: 11/10/2024  1. No evidence of acute intracranial abnormality. Electronically signed by Richmond Ferris    MRI BRAIN W WO CONTRAST    Result Date: 11/4/2024  Normal MRI of the brain.  No intracranial mass, hemorrhage or evidence of acute infarction. Electronically signed by NAEL BERNARDO    CTA HEAD NECK W

## 2024-11-11 NOTE — PLAN OF CARE
Full  Pronation/Supination: Full  Subtotal: 6    Normal Reflex Activity  Biceps, Triceps, Finger Flexors: Full  Subtotal: 2    Upper Extremity Total   Upper Extremity Total: 36    Wrist  Stability at 15 Degree Dorsiflexion: Full  Repeated Dorsiflexion/ Volar Flexion: Full  Stability at 15 Degree Dorsiflexion: Full  Repeated Dorsiflexion/ Volar Flexion: Full  Circumduction: Full  Wrist Total: 10    Hand  Mass Flexion: Full  Mass Extension: Full  Grasp A: Full  Grasp B: Full  Grasp C: Full  Grasp D: Full  Grasp E: Full  Hand Total: 14    Coordination/Speed  Tremor: None  Dysmetria: None  Time: 2-5s  Coordination/Speed Total: 5    Total A-D  Total A-D (Motor Function): 65         This is a reliable/valid measure of arm function after a neurological event. It has established value to characterize functional status and for measuring spontaneous and therapy-induced recovery; tests proximal and distal motor functions. Fugl-Keller Assessment - UE scores recorded between five and 30 days post neurologic event can be used to predict UE recovery at six months post neurologic event.  Severe = 0-21 points   Moderately Severe = 22-33 points   Moderate = 34-47 points   Mild = 48-66 points  RANDA Baird, LUIS EDUARDO Astudillo, MARGARITA Del Toro, ARAMIS Berkowitz, & DOMINIC Cintron (1992). Measurement of motor recovery after stroke: Outcome assessment and sample size requirements. Stroke, 23, pp. 4289-8066.   --------------------------------------------------------------------------------------------------------------------------------------------------------------------  MCID:  Stroke:   (Hua et al, 2001; n = 171; mean age 70 (11) years; assessed within 17 (12) days of stroke, Acute Stroke)  FMA Motor Scores from Admission to Discharge   10 point increase in FMA Upper Extremity = 1.5 change in discharge FIM   10 point increase in FMA Lower Extremity = 1.9 change in discharge FIM  MDC:   Stroke:   (Barak et al, 2008, n = 14, mean age = 59.9 (14.6)

## 2024-11-11 NOTE — CONSULTS
[facility]: [facility]    Marina Melton, MSHA, CNRN, ACNP-BC  [facility] Neurology  [address] [facility]  [phone]        Name:   Zoltan Fontana Jr   Medical record #: [id number]  Admission Date: 11/10/2024       Consult requested by: Parish Argueta MD     Reason for Consult:  Left facial droop      HISTORY OF PRESENT ILLNESS:     This is a 64 y.o. male who is admitted for left facial droop.    Zoltan Fontana Jr presented to the ED on 11/10/2024 with after waking up with headache, left facial droop, left-sided numbness.  He tells me that he woke up and his face felt weak and then went back to sleep and woke up to find that he had significantly more left facial paresis.  Upon arrival to the ED the provider found left facial paralysis, unable to fully blink the left eye, decreased sensation on left side left lower extremity with 4 /5 strength, upper extremity 5/5, his presenting blood pressure was 157/85.  Review of admission labs shows a presenting sodium of 134, glucose 155, no evidence of transaminitis, normal CBC.  The Neurology Service is asked to evaluate for left facial droop.    Patient was last seen by the [facility] Neurology team on 11/4/2024 by Dr. Sabillon for headache.  His exam was nonfocal at that time, and he was given a migraine cocktail.      Neuro-imaging:     CT Head: No acute process    CTA Head and Neck: No evidence of LVO or carotid stenosis    EKG: normal sinus rhythm.      Plan of care discussed with:        Impression/ Plan:      1.  64-year-old male who presents with gradual onset left facial droop symptoms are consistent with Bell's palsy:    MRI brain reviewed with no acute process  Neurochecks:  Every 4 hours  Patient started on Bell's palsy treatment by primary team  Start Lamictal 25 mg daily at renal dosing.  Discussed with Dr. Mena at patient's request and Dr. Mena is okay with him.    Thank you for allowing the Neurology Service the pleasure of participating in

## 2024-11-11 NOTE — ED NOTES
Bedside and Verbal shift change report given to JOYCE Hsieh (oncoming nurse) by JOYCE Mackey (offgoing nurse). Report included the following information Nurse Handoff Report, Index, ED Encounter Summary, ED SBAR, Adult Overview, MAR, and Recent Results.

## 2024-11-11 NOTE — PROGRESS NOTES
Orders received, chart reviewed and patient evaluated by physical therapy. Pending progression with skilled acute physical therapy, recommend:    Outpatient physical therapy for balance    Thank you for completing as able in order to maintain patient strength, endurance and independence.     Full evaluation to follow.    Lisa Lawson PT,DPT,NCS,CLT

## 2024-11-11 NOTE — PROGRESS NOTES
Patient to MRI    Fentanyl patch returned. Patient applied home patch yesterday. Home medications updated. Message sent to pharmacy    [id number]- Report given to Ray COOK

## 2024-11-11 NOTE — CARE COORDINATION
11/11/24  9:37 AM    Readmission Assessment  Number of Days since last admission?: 1-7 days  Previous Disposition: Home with Home Health  Who is being Interviewed: Patient, Caregiver  What was the patient's/caregiver's perception as to why they think they needed to return back to the hospital?: Other (Comment) (acute TIA symptoms)  Did you visit your Primary Care Physician after you left the hospital, before you returned this time?: No  Why weren't you able to visit your PCP?: Did not have an appointment  Did you see a specialist, such as Cardiac, Pulmonary, Orthopedic Physician, etc. after you left the hospital?: No  Who advised the patient to return to the hospital?: Self-referral  Does the patient report anything that got in the way of taking their medications?: No  In our efforts to provide the best possible care to you and others like you, can you think of anything that we could have done to help you after you left the hospital the first time, so that you might not have needed to return so soon?: Other (Comment) (N/A)        Care Management Initial Assessment  11/11/2024 9:37 AM  If patient is discharged prior to next notation, then this note serves as note for discharge by case management.    Reason for Admission:   TIA (transient ischemic attack) [G45.9]         Patient Admission Status: Inpatient  Date Admitted to Major Hospital: 11/10/24  RUR: Readmission Risk Score: 21.2    Hospitalization in the last 30 days (Readmission):  Yes        Advance Care Planning:  Code Status: Full Code  Primary Healthcare Decision Maker: (P) Legal Next of Kin  Primary Decision Maker: Erica Fontana - Spouse - [phone]   Advance Directive: has NO advanced directive - not interested in additional information     __________________________________________________________________________  Assessment:      11/11/24 0933   Service Assessment   Patient Orientation Alert and Oriented   Cognition Alert   History Provided By

## 2024-11-12 LAB
EKG ATRIAL RATE: 65 BPM
EKG DIAGNOSIS: NORMAL
EKG P AXIS: 55 DEGREES
EKG P-R INTERVAL: 128 MS
EKG Q-T INTERVAL: 404 MS
EKG QRS DURATION: 88 MS
EKG QTC CALCULATION (BAZETT): 420 MS
EKG R AXIS: 5 DEGREES
EKG T AXIS: 7 DEGREES
EKG VENTRICULAR RATE: 65 BPM

## 2024-11-12 PROCEDURE — 93010 ELECTROCARDIOGRAM REPORT: CPT | Performed by: INTERNAL MEDICINE

## 2024-11-13 ENCOUNTER — TELEPHONE (OUTPATIENT)
Age: 64
End: 2024-11-13

## 2024-11-13 ENCOUNTER — PATIENT MESSAGE (OUTPATIENT)
Age: 64
End: 2024-11-13

## 2024-11-13 NOTE — TELEPHONE ENCOUNTER
Care Transitions Initial Follow Up Call    Outreach made within 2 business days of discharge: Yes    Patient: Zoltan Fontana Jr Patient : 1960   MRN: [id number]  Reason for Admission: TIA  Discharge Date: 24       Spoke with: Left voicemail message for patient requesting a return call; also sent a MyChart message     Discharge department/facility: Home with self-care & family support    TCM Interactive Patient Contact:  Was patient able to fill all prescriptions: Yes  Was patient instructed to bring all medications to the follow-up visit: Yes  Is patient taking all medications as directed in the discharge summary? Yes  Does patient understand their discharge instructions: Yes  Does patient have questions or concerns that need addressed prior to 7-14 day follow up office visit: no    Additional needs identified to be addressed with provider  No needs identified Left voicemail message for patient requesting a return call; also sent a MyChart message requesting patient respond to schedule hospital follow-up appointment.             Scheduled appointment with PCP within 7-14 days    Follow Up  Future Appointments   Date Time Provider Department Center   2024 10:00 AM Twyla Bermudez APRN - CNP NEUMRSPB [facility]   2024  9:30 AM Terri Gama RD SFMOPN [facility]   1/3/2025  8:30 AM [place] APRN - NP NEUROWTCRSPB BS [facility]   2025  9:30 AM SS MED INF CHAIR 2 [facility]   2025  9:30 AM SS MED INF CHAIR 2 [facility]   3/3/2025  9:30 AM SS MED INF CHAIR 2 [facility]   3/5/2025  9:30 AM SS MED INF CHAIR 2 [facility]       Leeann Oneil RN

## 2024-11-18 ENCOUNTER — TELEPHONE (OUTPATIENT)
Facility: CLINIC | Age: 64
End: 2024-11-18

## 2024-11-18 NOTE — TELEPHONE ENCOUNTER
Patient wants to make a hospital follow up sooner than Dec 2nd, he was in the hospital for a mini stroke - Bel-Palsy     Please call Zoltan to schedule an appointment  [phone]

## 2024-11-24 NOTE — PROGRESS NOTES
Zoltan Fontana Jr (:  1960) is a 64 y.o. male,Established patient, here for evaluation of the following chief complaint(s):  Follow-Up from Hospital ([facility] 11/10/2024-2024;  [place] palsy. Patient complaining of left ear pain)       Diagnosis Orders   1. Bell palsy        2. Idiopathic membranous glomerulopathy        3. Primary hypertension        4. Chronic bilateral low back pain without sciatica        5. Gastro-esophageal reflux disease without esophagitis        6. Other headache syndrome  tiZANidine (ZANAFLEX) 4 MG tablet         Lamictal  Steroids,  Prednisone 80, bactrim m,w,fri  Losartan, propranolol  Tylenol, tizandidine  Prn medicine   Assessment & Plan  1. Bell's palsy.  The neurologist confirmed that the symptoms are not indicative of a stroke. He is experiencing facial pain and headaches, which are being managed with an increased dose of Lamictal from 25 mg to 50 mg. His ears appear normal upon examination, with no signs of infection. A prescription for tizanidine has been sent to [facility] to help with sleep and muscle relaxation.    2. Idiopathic membranous glomerulonephritis.  His kidney function is within normal limits, but he will continue on 80 mg of prednisone until , when the dosage will be reduced to 20 mg. He will then start taking cyclosporine in addition to the reduced prednisone. He will continue taking Bactrim three days a week as a prophylactic antibiotic.    3. Hypertension.  His blood pressure is well-controlled with losartan and propranolol.    4. Headaches.  The neurologist has approved an increase in Lamictal dosage from 25 mg to 50 mg to help manage his headaches. He will continue taking Tylenol as needed and tizanidine at night to help with sleep and muscle relaxation.    5. Acid reflux.  He is using pantoprazole as needed for acid reflux and has been advised to use aloe for additional relief.               Subjective   HPI                   Hospital Course:

## 2024-11-25 ENCOUNTER — TELEMEDICINE (OUTPATIENT)
Age: 64
End: 2024-11-25
Payer: MEDICARE

## 2024-11-25 DIAGNOSIS — G44.209 TENSION HEADACHE: ICD-10-CM

## 2024-11-25 DIAGNOSIS — G43.709 CHRONIC MIGRAINE WITHOUT AURA WITHOUT STATUS MIGRAINOSUS, NOT INTRACTABLE: ICD-10-CM

## 2024-11-25 DIAGNOSIS — R20.2 PARESTHESIA: ICD-10-CM

## 2024-11-25 DIAGNOSIS — G51.0 BELL'S PALSY: Primary | ICD-10-CM

## 2024-11-25 PROBLEM — R51.9 ACUTE INTRACTABLE HEADACHE, UNSPECIFIED HEADACHE TYPE: Status: RESOLVED | Noted: 2024-11-04 | Resolved: 2024-11-25

## 2024-11-25 PROCEDURE — G8420 CALC BMI NORM PARAMETERS: HCPCS | Performed by: NURSE PRACTITIONER

## 2024-11-25 PROCEDURE — 1036F TOBACCO NON-USER: CPT | Performed by: NURSE PRACTITIONER

## 2024-11-25 PROCEDURE — G8484 FLU IMMUNIZE NO ADMIN: HCPCS | Performed by: NURSE PRACTITIONER

## 2024-11-25 PROCEDURE — 99215 OFFICE O/P EST HI 40 MIN: CPT | Performed by: NURSE PRACTITIONER

## 2024-11-25 PROCEDURE — 1111F DSCHRG MED/CURRENT MED MERGE: CPT | Performed by: NURSE PRACTITIONER

## 2024-11-25 PROCEDURE — G8427 DOCREV CUR MEDS BY ELIG CLIN: HCPCS | Performed by: NURSE PRACTITIONER

## 2024-11-25 PROCEDURE — 3017F COLORECTAL CA SCREEN DOC REV: CPT | Performed by: NURSE PRACTITIONER

## 2024-11-25 RX ORDER — BUTALBITAL, ACETAMINOPHEN AND CAFFEINE 300; 40; 50 MG/1; MG/1; MG/1
1 CAPSULE ORAL EVERY 6 HOURS PRN
Qty: 30 CAPSULE | Refills: 2 | Status: SHIPPED | OUTPATIENT
Start: 2024-11-25 | End: 2024-11-26

## 2024-11-25 RX ORDER — LAMOTRIGINE 25 MG/1
TABLET ORAL
Qty: 60 TABLET | Refills: 3 | Status: SHIPPED | OUTPATIENT
Start: 2024-11-25 | End: 2025-11-25

## 2024-11-25 NOTE — ASSESSMENT & PLAN NOTE
Patient with a history of migraines.  Normally well-controlled with less with 1 every couple months.  In the setting of Bell's palsy having headaches greater than 15 a month    Migraine:  Frequency: > 15 per month - started after [place] Palsy.  Prior to the Bell's palsy may have a migraine once every couple months.  Intensity: >7  Location: Left frontal-temporal  Quality: Painful, but not throbbing, painful  Duration: Can last up days if doesn't take Fioricet  Associated Symptoms: Light and Sound sensitivity    On propranolol 10 mg twice daily.  If I do not increase the Lamictal to 50 mg daily, will uptitrate the propranolol to 40 mg twice daily  If Lamictal is uptitrated to 50 mg daily, and no relief of migrainous type symptoms then can consider uptitrating propranolol to 40 mg twice a day  Fioricet is effective.  Will continue Fioricet for abortive therapy. Take one to two by mouth on onset of migraine may take and additional pill in 40 min if needed. No more than 3 in a day. Can't use more than 10 days a month or more than 2 days in a seven day cycle.

## 2024-11-25 NOTE — ASSESSMENT & PLAN NOTE
Patient with migraines, hypertension, chronic low back pain, membranous glomerulopathy (on prednisone 80 mg daily and Bactrim DS Monday Wednesday Friday for prophylaxis as well as Eliquis) who was admitted to the hospital 11/10-11/11/2024 with symptoms of left facial droop and facial numbness as well as headache. MRI brain negative for stroke. Patient was evaluated by neurology, subsequently diagnosed with a Bell's palsy.  Bell's palsy treatment was started by the primary team.  Patient was started on low-dose Lamictal (25 mg daily).    Patient continues to be symptomatic from his Bell's palsy with dense left lower facial droop, inability to completely close left eye and pain to touch left sided face and temporal region.    -CTA head and neck: No large vessel occlusion or hemodynamic significant carotid stenosis  -CT perfusion: No perfusion defect  -Brain: No significant abnormality or acute process  -Labs: Cholesterol 208, .2, hemoglobin A1c 5.9    Patient completed 7-day course of Valtrex  Patient was on prednisone 80 mg daily when he was being treated for Bell's palsy ( due to membranous glomerulopathy), will be titrated down to 20 mg.  Prednisone dosing at the discretion of nephrology  Patient will wear eye patch from time to time.  He is using wetting drops frequently to keep his left eye moist  He does appear to have some neuropathic pain related to the Bell's palsy.  Currently on Lamictal 25 mg.  Patient will speak to nephrology,  anticipate increasing to 50 mg daily if no contraindication.    Addendum 11/25/2024: Mr. Fotnana reached out to his nephrologist.  They cleared him to increase the Lamictal to 50 mg daily.  I will place him on Lamictal 25 mg 1 tablet BID.

## 2024-11-25 NOTE — PROGRESS NOTES
by NAEL HABIB        CT Result (most recent):  CT BRAIN PERFUSION 11/10/2024    Narrative  *PRELIMINARY REPORT*    Normal perfusion. No large vessel occlusion. Right thyroid 5 mm nodule.    Preliminary report was provided by Dr. Roldan, the on-call radiologist, at 1536    Final report to follow.    *END PRELIMINARY REPORT*            CLINICAL HISTORY: Code stroke. Altered mental status.    EXAMINATION:  CT ANGIOGRAPHY HEAD AND NECK, CT PERFUSION    COMPARISON: November 4, 2024    TECHNIQUE:  Following the uneventful administration of iodinated contrast  material, axial CT angiography of the head and neck was performed. Delayed axial  images through the head were also obtained. Coronal and sagittal reconstructions  were obtained. Manual postprocessing of images was performed. 3-D  Sagittal  maximal intensity projection images were obtained.  3-D Coronal maximal  intensity projections were obtained. CT brain perfusion was performed with  generation of hemodynamic maps of multiple parameters, including cerebral blood  flow, cerebral blood volume, mean transit time, and TMAX. CT dose reduction was  achieved through use of a standardized protocol tailored for this examination  and automatic exposure control for dose modulation. This study was analyzed by  the [employer] algorithm.    FINDINGS:    Delayed contrast-enhanced head CT:    The ventricles are midline without hydrocephalus.  There is no acute intra or  extra-axial hemorrhage. The basal cisterns are clear.  The paranasal sinuses are  clear.    CTA NECK:    Great vessels: Normal arch anatomy with the origins patent.    Right subclavian artery: Patent    Left subclavian artery: Patent    Right common carotid artery: Patent    Left common carotid artery: Patent    Cervical right internal carotid artery: Mild plaque with less than 50% stenosis  by NASCET criteria.    Cervical left internal carotid artery: Patent with no significant stenosis by  NASCET criteria.    Right

## 2024-11-25 NOTE — ASSESSMENT & PLAN NOTE
Patient with chronic low back pain.  Also has cervical radicular disease status post ACDF.  Will intermittently have some weakness and/or numbness and tingling left upper and left lower extremity.  The symptoms are stereotypical and resolve    Patient states he will have intermittent repetitive symptoms that resolved.  He was concerned this may be related to stroke or TIA.  I felt that the stereotypical repetitive nature this was not a TIA, MRI was negative for stroke.  He is also on maximal medical therapy with Eliquis 2.5 mg twice daily and Lipitor 20 mg nightly  Will continue to monitor symptoms, at follow-up may need to consider EMG nerve conduction studies or repeat imaging

## 2024-11-26 ENCOUNTER — TELEPHONE (OUTPATIENT)
Age: 64
End: 2024-11-26

## 2024-11-26 ENCOUNTER — OFFICE VISIT (OUTPATIENT)
Facility: CLINIC | Age: 64
End: 2024-11-26

## 2024-11-26 VITALS
RESPIRATION RATE: 16 BRPM | SYSTOLIC BLOOD PRESSURE: 138 MMHG | TEMPERATURE: 98.1 F | HEART RATE: 76 BPM | BODY MASS INDEX: 25.56 KG/M2 | OXYGEN SATURATION: 98 % | WEIGHT: 172.6 LBS | DIASTOLIC BLOOD PRESSURE: 69 MMHG | HEIGHT: 69 IN

## 2024-11-26 DIAGNOSIS — G51.0 BELL PALSY: ICD-10-CM

## 2024-11-26 DIAGNOSIS — I10 PRIMARY HYPERTENSION: ICD-10-CM

## 2024-11-26 DIAGNOSIS — G89.29 CHRONIC BILATERAL LOW BACK PAIN WITHOUT SCIATICA: ICD-10-CM

## 2024-11-26 DIAGNOSIS — M54.50 CHRONIC BILATERAL LOW BACK PAIN WITHOUT SCIATICA: ICD-10-CM

## 2024-11-26 DIAGNOSIS — Z00.00 WELCOME TO MEDICARE PREVENTIVE VISIT: ICD-10-CM

## 2024-11-26 DIAGNOSIS — G44.89 OTHER HEADACHE SYNDROME: ICD-10-CM

## 2024-11-26 DIAGNOSIS — K21.9 GASTRO-ESOPHAGEAL REFLUX DISEASE WITHOUT ESOPHAGITIS: ICD-10-CM

## 2024-11-26 DIAGNOSIS — N05.2 IDIOPATHIC MEMBRANOUS GLOMERULOPATHY: ICD-10-CM

## 2024-11-26 PROBLEM — G44.209 TENSION HEADACHE: Status: ACTIVE | Noted: 2024-11-26

## 2024-11-26 RX ORDER — BUTALBITAL, ACETAMINOPHEN AND CAFFEINE 50; 325; 40 MG/1; MG/1; MG/1
TABLET ORAL
Qty: 30 TABLET | Refills: 3 | Status: SHIPPED | OUTPATIENT
Start: 2024-11-26

## 2024-11-26 ASSESSMENT — PATIENT HEALTH QUESTIONNAIRE - PHQ9
SUM OF ALL RESPONSES TO PHQ9 QUESTIONS 1 & 2: 0
SUM OF ALL RESPONSES TO PHQ QUESTIONS 1-9: 0
4. FEELING TIRED OR HAVING LITTLE ENERGY: NOT AT ALL
SUM OF ALL RESPONSES TO PHQ QUESTIONS 1-9: 0
6. FEELING BAD ABOUT YOURSELF - OR THAT YOU ARE A FAILURE OR HAVE LET YOURSELF OR YOUR FAMILY DOWN: NOT AT ALL
2. FEELING DOWN, DEPRESSED OR HOPELESS: NOT AT ALL
SUM OF ALL RESPONSES TO PHQ QUESTIONS 1-9: 0
SUM OF ALL RESPONSES TO PHQ QUESTIONS 1-9: 0
8. MOVING OR SPEAKING SO SLOWLY THAT OTHER PEOPLE COULD HAVE NOTICED. OR THE OPPOSITE, BEING SO FIGETY OR RESTLESS THAT YOU HAVE BEEN MOVING AROUND A LOT MORE THAN USUAL: NOT AT ALL
5. POOR APPETITE OR OVEREATING: NOT AT ALL
1. LITTLE INTEREST OR PLEASURE IN DOING THINGS: NOT AT ALL
3. TROUBLE FALLING OR STAYING ASLEEP: NOT AT ALL
9. THOUGHTS THAT YOU WOULD BE BETTER OFF DEAD, OR OF HURTING YOURSELF: NOT AT ALL
7. TROUBLE CONCENTRATING ON THINGS, SUCH AS READING THE NEWSPAPER OR WATCHING TELEVISION: NOT AT ALL
10. IF YOU CHECKED OFF ANY PROBLEMS, HOW DIFFICULT HAVE THESE PROBLEMS MADE IT FOR YOU TO DO YOUR WORK, TAKE CARE OF THINGS AT HOME, OR GET ALONG WITH OTHER PEOPLE: NOT DIFFICULT AT ALL

## 2024-11-26 NOTE — TELEPHONE ENCOUNTER
RE:  butalbital-APAP-caffeine (FIORICET) -40 MG CAPS per capsule         Pa request sent to [facility] via epic     Medicare Case ID: [id number]     Status denied     Denial reason- This is an off-label use that is not medically accepted. The Medicare rule in the Prescription Drug Benefit Manual (Chapter 6, Section 10.6) says a drug must be used for a medically accepted indication (covered use). Off-label use is medically accepted when there is proof in one or more of the drug guides that the drug works for your condition.     We look at the two major drug guides (compendia): the DRUGDEX Information System and the [facility] Form[facility] Drug Information (AHFS-DI). [facility] has decided that there is no proof in either drug guide that this drug works for your condition. Per Medicare rules, it is not covered.     Denial letter uploaded to media     Details in the referral tab    FYI to nurse

## 2024-11-26 NOTE — ASSESSMENT & PLAN NOTE
Patient with Bell's palsy who is having headache and facial pain.  Facial pain likely neuropathic in nature, suspect headache is tension type headache due to left lower facial weakness with compensation and other muscle groups, these headaches started after development of his Bell's palsy.    Fioricet breaks the headache cycle.  Will order Fioricet for his tension type headache.

## 2024-11-27 NOTE — TELEPHONE ENCOUNTER
RE:butalbital-acetaminophen-caffeine (FIORICET, ESGIC) -40 MG      PA request sent to [employer] via cmm     (Key: [id number])    Available without authorization    [facility] patient should be able to fill this medication at the pharmacy without any issues.    FYI to nurse

## 2024-12-27 PROBLEM — N03.2: Status: ACTIVE | Noted: 2024-12-27

## 2024-12-27 PROBLEM — N04.21: Status: ACTIVE | Noted: 2024-12-27

## 2024-12-27 PROBLEM — N04.20 MEMBRANOUS GLOMERULONEPHRITIS WITH NEPHROSIS: Status: ACTIVE | Noted: 2024-12-27

## 2025-01-03 ENCOUNTER — OFFICE VISIT (OUTPATIENT)
Age: 65
End: 2025-01-03
Payer: MEDICARE

## 2025-01-03 VITALS
OXYGEN SATURATION: 98 % | SYSTOLIC BLOOD PRESSURE: 128 MMHG | DIASTOLIC BLOOD PRESSURE: 76 MMHG | HEART RATE: 73 BPM | RESPIRATION RATE: 20 BRPM

## 2025-01-03 DIAGNOSIS — G51.0 BELL'S PALSY: ICD-10-CM

## 2025-01-03 DIAGNOSIS — R20.2 PARESTHESIA: ICD-10-CM

## 2025-01-03 DIAGNOSIS — G44.209 TENSION HEADACHE: Primary | ICD-10-CM

## 2025-01-03 DIAGNOSIS — G43.709 CHRONIC MIGRAINE WITHOUT AURA WITHOUT STATUS MIGRAINOSUS, NOT INTRACTABLE: ICD-10-CM

## 2025-01-03 PROCEDURE — 99204 OFFICE O/P NEW MOD 45 MIN: CPT

## 2025-01-03 PROCEDURE — G8417 CALC BMI ABV UP PARAM F/U: HCPCS

## 2025-01-03 PROCEDURE — G8427 DOCREV CUR MEDS BY ELIG CLIN: HCPCS

## 2025-01-03 PROCEDURE — 1036F TOBACCO NON-USER: CPT

## 2025-01-03 PROCEDURE — 3017F COLORECTAL CA SCREEN DOC REV: CPT

## 2025-01-03 RX ORDER — PREDNISONE 20 MG/1
80 TABLET ORAL DAILY
COMMUNITY
Start: 2024-11-03

## 2025-01-03 RX ORDER — BUTALBITAL, ACETAMINOPHEN AND CAFFEINE 50; 325; 40 MG/1; MG/1; MG/1
TABLET ORAL
Qty: 30 TABLET | Refills: 3 | Status: SHIPPED | OUTPATIENT
Start: 2025-01-03

## 2025-01-03 RX ORDER — TOPIRAMATE 25 MG/1
TABLET, FILM COATED ORAL
Qty: 60 TABLET | Refills: 3 | Status: SHIPPED | OUTPATIENT
Start: 2025-01-03

## 2025-01-03 RX ORDER — CYCLOPHOSPHAMIDE 50 MG/1
CAPSULE ORAL
COMMUNITY
Start: 2024-11-27

## 2025-01-03 NOTE — PROGRESS NOTES
Since ER visit, virtual f/up with Twyla Bermudez   Also diagnosed with [place] Palsy- the droopiness is gone but still has the nerve pain     Pain is on the side of his face and up towards his forehead   Accompanied by his daughter  Occasionally he does get the headaches, sometimes he can take tylenol and coffee it will sometimes last all day and then other time it would go away shortly after medications  When he started using the fiorcet he was taking it about 4 to 5 times a week, then reduced to 3 times a week               
Surgical History:   Procedure Laterality Date    ANTERIOR CRUCIATE LIGAMENT REPAIR      BACK SURGERY  2010    rods and screws in the back, fusion. 4 surgeries total    CT BIOPSY RENAL  10/15/2024    CT BIOPSY RENAL 10/15/2024 [facility] CT    OTHER SURGICAL HISTORY  02/2024    neck fusion       Family History   Problem Relation Age of Onset    Hypertension Paternal Grandfather     Heart Disease Paternal Grandfather     Heart Disease Paternal Grandmother     Heart Disease Sister     Heart Disease Father     Hypertension Paternal Grandmother     Cancer Mother        Social History     Socioeconomic History    Marital status:      Spouse name: None    Number of children: None    Years of education: None    Highest education level: None   Tobacco Use    Smoking status: Never     Passive exposure: Never    Smokeless tobacco: Never   Vaping Use    Vaping status: Never Used   Substance and Sexual Activity    Alcohol use: Yes     Comment: occasional    Drug use: Never    Sexual activity: Yes     Partners: Female     Comment:      Social Determinants of Health     Financial Resource Strain: Low Risk  (9/23/2024)    Overall Financial Resource Strain (CARDIA)     Difficulty of Paying Living Expenses: Not hard at all   Food Insecurity: No Food Insecurity (11/4/2024)    Hunger Vital Sign     Worried About Running Out of Food in the Last Year: Never true     Ran Out of Food in the Last Year: Never true   Transportation Needs: Unmet Transportation Needs (11/4/2024)    PRAPARE - Transportation     Lack of Transportation (Medical): Yes     Lack of Transportation (Non-Medical): Yes   Physical Activity: Inactive (11/26/2024)    Exercise Vital Sign     Days of Exercise per Week: 0 days     Minutes of Exercise per Session: 0 min   Housing Stability: Low Risk  (11/4/2024)    Housing Stability Vital Sign     Unable to Pay for Housing in the Last Year: No     Number of Times Moved in the Last Year: 0     Homeless in the Last

## 2025-01-06 ENCOUNTER — HOSPITAL ENCOUNTER (OUTPATIENT)
Facility: HOSPITAL | Age: 65
Setting detail: INFUSION SERIES
Discharge: HOME | End: 2025-01-06
Payer: MEDICARE

## 2025-01-06 VITALS
TEMPERATURE: 97.8 F | DIASTOLIC BLOOD PRESSURE: 84 MMHG | BODY MASS INDEX: 26.88 KG/M2 | OXYGEN SATURATION: 97 % | HEART RATE: 55 BPM | SYSTOLIC BLOOD PRESSURE: 170 MMHG | WEIGHT: 182 LBS | RESPIRATION RATE: 18 BRPM

## 2025-01-06 DIAGNOSIS — N04.21 PRIMARY MEMBRANOUS NEPHROPATHY WITH NEPHROTIC SYNDROME: ICD-10-CM

## 2025-01-06 DIAGNOSIS — N03.2 CHRONIC MEMBRANOUS NEPHRITIS: ICD-10-CM

## 2025-01-06 DIAGNOSIS — N04.20 MEMBRANOUS GLOMERULONEPHRITIS WITH NEPHROSIS: Primary | ICD-10-CM

## 2025-01-06 LAB
ALBUMIN SERPL-MCNC: 3.3 G/DL (ref 3.5–5)
ALBUMIN/GLOB SERPL: 1.1 (ref 1.1–2.2)
ALP SERPL-CCNC: 91 U/L (ref 45–117)
ALT SERPL-CCNC: 18 U/L (ref 12–78)
ANION GAP SERPL CALC-SCNC: 4 MMOL/L (ref 2–12)
APPEARANCE UR: CLEAR
AST SERPL-CCNC: 10 U/L (ref 15–37)
BACTERIA URNS QL MICRO: NEGATIVE /HPF
BASOPHILS # BLD: 0 K/UL (ref 0–0.1)
BASOPHILS NFR BLD: 0 % (ref 0–1)
BILIRUB SERPL-MCNC: 0.6 MG/DL (ref 0.2–1)
BILIRUB UR QL: NEGATIVE
BUN SERPL-MCNC: 17 MG/DL (ref 6–20)
BUN/CREAT SERPL: 16 (ref 12–20)
CALCIUM SERPL-MCNC: 9.1 MG/DL (ref 8.5–10.1)
CHLORIDE SERPL-SCNC: 110 MMOL/L (ref 97–108)
CO2 SERPL-SCNC: 28 MMOL/L (ref 21–32)
COLOR UR: ABNORMAL
CREAT SERPL-MCNC: 1.07 MG/DL (ref 0.7–1.3)
CREAT UR-MCNC: 146 MG/DL
DIFFERENTIAL METHOD BLD: ABNORMAL
EOSINOPHIL # BLD: 0 K/UL (ref 0–0.4)
EOSINOPHIL NFR BLD: 0 % (ref 0–7)
EPITH CASTS URNS QL MICRO: ABNORMAL /LPF
ERYTHROCYTE [DISTWIDTH] IN BLOOD BY AUTOMATED COUNT: 16.8 % (ref 11.5–14.5)
GLOBULIN SER CALC-MCNC: 2.9 G/DL (ref 2–4)
GLUCOSE SERPL-MCNC: 96 MG/DL (ref 65–100)
GLUCOSE UR STRIP.AUTO-MCNC: NEGATIVE MG/DL
HCT VFR BLD AUTO: 36.7 % (ref 36.6–50.3)
HGB BLD-MCNC: 12.9 G/DL (ref 12.1–17)
HGB UR QL STRIP: NEGATIVE
IMM GRANULOCYTES # BLD AUTO: 0.1 K/UL (ref 0–0.04)
IMM GRANULOCYTES NFR BLD AUTO: 1 % (ref 0–0.5)
KETONES UR QL STRIP.AUTO: ABNORMAL MG/DL
LEUKOCYTE ESTERASE UR QL STRIP.AUTO: ABNORMAL
LYMPHOCYTES # BLD: 0.9 K/UL (ref 0.8–3.5)
LYMPHOCYTES NFR BLD: 10 % (ref 12–49)
MCH RBC QN AUTO: 33.6 PG (ref 26–34)
MCHC RBC AUTO-ENTMCNC: 35.1 G/DL (ref 30–36.5)
MCV RBC AUTO: 95.6 FL (ref 80–99)
MONOCYTES # BLD: 0.8 K/UL (ref 0–1)
MONOCYTES NFR BLD: 9 % (ref 5–13)
NEUTS SEG # BLD: 7.1 K/UL (ref 1.8–8)
NEUTS SEG NFR BLD: 80 % (ref 32–75)
NITRITE UR QL STRIP.AUTO: NEGATIVE
NRBC # BLD: 0 K/UL (ref 0–0.01)
NRBC BLD-RTO: 0 PER 100 WBC
PH UR STRIP: 6 (ref 5–8)
PLATELET # BLD AUTO: 271 K/UL (ref 150–400)
PMV BLD AUTO: 10.5 FL (ref 8.9–12.9)
POTASSIUM SERPL-SCNC: 3 MMOL/L (ref 3.5–5.1)
PROT SERPL-MCNC: 6.2 G/DL (ref 6.4–8.2)
PROT UR STRIP-MCNC: 100 MG/DL
PROT UR-MCNC: 89 MG/DL (ref 0–11.9)
PROT/CREAT UR-RTO: 0.6
RBC # BLD AUTO: 3.84 M/UL (ref 4.1–5.7)
RBC #/AREA URNS HPF: ABNORMAL /HPF (ref 0–5)
SODIUM SERPL-SCNC: 142 MMOL/L (ref 136–145)
SP GR UR REFRACTOMETRY: 1.02 (ref 1–1.03)
SPECIMEN HOLD: NORMAL
UROBILINOGEN UR QL STRIP.AUTO: 1 EU/DL (ref 0.2–1)
WBC # BLD AUTO: 8.9 K/UL (ref 4.1–11.1)
WBC URNS QL MICRO: ABNORMAL /HPF (ref 0–4)

## 2025-01-06 PROCEDURE — 80053 COMPREHEN METABOLIC PANEL: CPT

## 2025-01-06 PROCEDURE — 81001 URINALYSIS AUTO W/SCOPE: CPT

## 2025-01-06 PROCEDURE — 85025 COMPLETE CBC W/AUTO DIFF WBC: CPT

## 2025-01-06 PROCEDURE — 6360000002 HC RX W HCPCS: Performed by: INTERNAL MEDICINE

## 2025-01-06 PROCEDURE — 2580000003 HC RX 258: Performed by: INTERNAL MEDICINE

## 2025-01-06 PROCEDURE — 36415 COLL VENOUS BLD VENIPUNCTURE: CPT

## 2025-01-06 PROCEDURE — 82570 ASSAY OF URINE CREATININE: CPT

## 2025-01-06 PROCEDURE — 84156 ASSAY OF PROTEIN URINE: CPT

## 2025-01-06 PROCEDURE — 96365 THER/PROPH/DIAG IV INF INIT: CPT

## 2025-01-06 RX ORDER — SODIUM CHLORIDE 9 MG/ML
5-250 INJECTION, SOLUTION INTRAVENOUS PRN
Status: CANCELLED | OUTPATIENT
Start: 2025-01-07

## 2025-01-06 RX ORDER — SODIUM CHLORIDE 9 MG/ML
5-250 INJECTION, SOLUTION INTRAVENOUS PRN
Status: DISCONTINUED | OUTPATIENT
Start: 2025-01-06 | End: 2025-01-07 | Stop reason: HOSPADM

## 2025-01-06 RX ADMIN — SODIUM CHLORIDE 1000 MG: 900 INJECTION INTRAVENOUS at 09:52

## 2025-01-06 RX ADMIN — SODIUM CHLORIDE 25 ML/HR: 9 INJECTION, SOLUTION INTRAVENOUS at 09:49

## 2025-01-06 NOTE — PROGRESS NOTES
Outpatient [facility]fusion Center Short Visit Progress Note    Mr. Addi Bowser admitted to [facility] for day 1 of solumedrol ambulatory in stable condition. Assessment completed. Still has headaches at time from bells palsy.    Vital Signs:  BP (!) 151/82   Pulse 72   Temp 97.8 °F (36.6 °C) (Temporal)   Resp 18   Wt 82.6 kg (182 lb)   SpO2 97%   BMI 26.88 kg/m²       R arm PIV with positive blood return.   Lab Results:  Pending in cc        Medications:  Medications Administered         0.9 % sodium chloride infusion Admin Date  01/06/2025 Action  New Bag Dose  25 mL/hr Rate  25 mL/hr Route  IntraVENous Documented By  Arleth Bernal, RN        methylPREDNISolone (Solu-MEDROL) 1000 mg in 100 mL NS IVPB minibag Admin Date  01/06/2025 Action  New Bag Dose  1,000 mg Rate  100 mL/hr Route  IntraVENous Documented By  Arleth Bernal, RN              Patient tolerated treatment well. Patient discharged from [facility]patient [facility]fusion Center ambulatory in no distress. Patient aware of next appointment.    Arleth Bernal RN  January 6, 2025    Future Appointments   Date Time Provider Department Center   1/7/2025  2:30 PM [facility] CHAIR 6 [facility]   1/8/2025  9:30 AM [facility] CHAIR 2 [facility]   1/14/2025  9:00 AM [facility]ama [facility]   3/3/2025  9:30 AM [facility] CHAIR 2 [facility]   3/5/2025  9:30 AM [facility] CHAIR 2 [facility]   4/4/2025  8:30 AM [place] APRN - NP [facility]

## 2025-01-07 ENCOUNTER — HOSPITAL ENCOUNTER (OUTPATIENT)
Facility: HOSPITAL | Age: 65
Setting detail: INFUSION SERIES
Discharge: HOME | End: 2025-01-07
Payer: MEDICARE

## 2025-01-07 VITALS
DIASTOLIC BLOOD PRESSURE: 88 MMHG | HEART RATE: 65 BPM | SYSTOLIC BLOOD PRESSURE: 158 MMHG | OXYGEN SATURATION: 96 % | TEMPERATURE: 98 F | RESPIRATION RATE: 18 BRPM

## 2025-01-07 DIAGNOSIS — N04.21 PRIMARY MEMBRANOUS NEPHROPATHY WITH NEPHROTIC SYNDROME: ICD-10-CM

## 2025-01-07 DIAGNOSIS — N04.20 MEMBRANOUS GLOMERULONEPHRITIS WITH NEPHROSIS: ICD-10-CM

## 2025-01-07 DIAGNOSIS — N03.2 CHRONIC MEMBRANOUS NEPHRITIS: Primary | ICD-10-CM

## 2025-01-07 PROCEDURE — 6360000002 HC RX W HCPCS: Performed by: INTERNAL MEDICINE

## 2025-01-07 PROCEDURE — 96365 THER/PROPH/DIAG IV INF INIT: CPT

## 2025-01-07 PROCEDURE — 2580000003 HC RX 258: Performed by: INTERNAL MEDICINE

## 2025-01-07 RX ORDER — SODIUM CHLORIDE 9 MG/ML
5-250 INJECTION, SOLUTION INTRAVENOUS PRN
Status: CANCELLED | OUTPATIENT
Start: 2025-01-08

## 2025-01-07 RX ORDER — SODIUM CHLORIDE 9 MG/ML
5-250 INJECTION, SOLUTION INTRAVENOUS PRN
Status: DISCONTINUED | OUTPATIENT
Start: 2025-01-07 | End: 2025-01-08 | Stop reason: HOSPADM

## 2025-01-07 RX ADMIN — SODIUM CHLORIDE 1000 MG: 900 INJECTION INTRAVENOUS at 15:03

## 2025-01-07 NOTE — PROGRESS NOTES
[facility] Progress Note    Date: 2025    Name: Zoltan Fontana Jr    MRN: [id number]         : 1960    Mr. Addi Bowser arrived ambulatory and in no distress for  Day 2 Solumedrol Infusion.  Assessment was completed, no acute issues at this time, no new complaints voiced.  24 G PIV established to right hand, + blood return.     Mr. Addi Bowser's vitals were reviewed.  Vitals:    25 1430 25 1600   BP: (!) 167/75 (!) 158/88   Pulse: 73 65   Resp: 18    Temp: 98 °F (36.7 °C)    TempSrc: Temporal    SpO2: 96%         Medications:  Medications Administered         methylPREDNISolone (Solu-MEDROL) 1000 mg in 100 mL NS IVPB minibag Admin Date  2025 Action  New Bag Dose  1,000 mg Rate  100 mL/hr Route  IntraVENous Documented By  Lupe Small, RN             Mr. Addi Bowser tolerated treatment well and was discharged from Outpatient Infusion Center in stable condition.   PIV flushed & removed. He is aware of future appointments.     Lupe Small RN  2025  Future Appointments   Date Time Provider Department Center   2025  9:30 AM [facility] INF CHAIR 2 [facility]   2025  9:00 AM Terri Gama RD [facility]   3/3/2025  9:30 AM [facility] MED INF CHAIR 2 [facility]   3/5/2025  9:30 AM [facility] MED INF CHAIR 2 [facility]   2025  8:30 AM [place] APRN - NP [facility] AM[facility]

## 2025-01-08 ENCOUNTER — HOSPITAL ENCOUNTER (OUTPATIENT)
Facility: HOSPITAL | Age: 65
Setting detail: INFUSION SERIES
Discharge: HOME | End: 2025-01-08
Payer: MEDICARE

## 2025-01-08 VITALS
DIASTOLIC BLOOD PRESSURE: 87 MMHG | RESPIRATION RATE: 18 BRPM | HEART RATE: 66 BPM | SYSTOLIC BLOOD PRESSURE: 165 MMHG | TEMPERATURE: 97.4 F | OXYGEN SATURATION: 97 %

## 2025-01-08 DIAGNOSIS — N04.20 MEMBRANOUS GLOMERULONEPHRITIS WITH NEPHROSIS: ICD-10-CM

## 2025-01-08 DIAGNOSIS — N04.21 PRIMARY MEMBRANOUS NEPHROPATHY WITH NEPHROTIC SYNDROME: ICD-10-CM

## 2025-01-08 DIAGNOSIS — N03.2 CHRONIC MEMBRANOUS NEPHRITIS: Primary | ICD-10-CM

## 2025-01-08 PROCEDURE — 96365 THER/PROPH/DIAG IV INF INIT: CPT

## 2025-01-08 PROCEDURE — 2580000003 HC RX 258: Performed by: INTERNAL MEDICINE

## 2025-01-08 PROCEDURE — 6360000002 HC RX W HCPCS: Performed by: INTERNAL MEDICINE

## 2025-01-08 RX ORDER — SODIUM CHLORIDE 9 MG/ML
5-250 INJECTION, SOLUTION INTRAVENOUS PRN
Status: DISCONTINUED | OUTPATIENT
Start: 2025-01-08 | End: 2025-01-09 | Stop reason: HOSPADM

## 2025-01-08 RX ORDER — SODIUM CHLORIDE 9 MG/ML
5-250 INJECTION, SOLUTION INTRAVENOUS PRN
Status: CANCELLED | OUTPATIENT
Start: 2025-01-20

## 2025-01-08 RX ADMIN — SODIUM CHLORIDE 1000 MG: 900 INJECTION INTRAVENOUS at 09:55

## 2025-01-08 ASSESSMENT — PAIN SCALES - GENERAL: PAINLEVEL_OUTOF10: 0

## 2025-01-08 NOTE — PROGRESS NOTES
[facility] Progress Note    Date: 2025    Name: Zoltan Fontana Jr    MRN: [id number]         : 1960    Mr. Addi Bowser Arrived ambulatory and in no distress for D 3 of Solumedrol Regimen.  Assessment was completed, no acute issues at this time, no new complaints voiced.   accessed without difficulty, labs drawn & sent for processing.          Mr. Addi Bowser's vitals were reviewed.  Vitals:    25 0930   BP: (!) 165/87   Pulse: 66   Resp: 18   Temp: 97.4 °F (36.3 °C)   SpO2: 97%     Medications:      Medications Administered         methylPREDNISolone (Solu-MEDROL) 1000 mg in 100 mL NS IVPB minibag Admin Date  2025 Action  New Bag Dose  1,000 mg Rate  100 mL/hr Route  IntraVENous Documented By  Argelia Lobo, RN               Mr. Addi Bowser tolerated treatment well and was discharged from Outpatient Infusion Center in stable condition at 1110.    PIV de-accessed, flushed per protocol. He is to return on 3/3/25  at  0930 for his next appointment.    ARGELIA LOBO RN  2025

## 2025-02-04 ENCOUNTER — OFFICE VISIT (OUTPATIENT)
Facility: CLINIC | Age: 65
End: 2025-02-04
Payer: MEDICARE

## 2025-02-04 VITALS
RESPIRATION RATE: 16 BRPM | WEIGHT: 181 LBS | HEART RATE: 73 BPM | TEMPERATURE: 97.8 F | OXYGEN SATURATION: 98 % | SYSTOLIC BLOOD PRESSURE: 142 MMHG | DIASTOLIC BLOOD PRESSURE: 78 MMHG | HEIGHT: 69 IN | BODY MASS INDEX: 26.81 KG/M2

## 2025-02-04 DIAGNOSIS — I10 PRIMARY HYPERTENSION: ICD-10-CM

## 2025-02-04 DIAGNOSIS — N05.2 IDIOPATHIC MEMBRANOUS GLOMERULOPATHY: ICD-10-CM

## 2025-02-04 DIAGNOSIS — J40 BRONCHITIS: Primary | ICD-10-CM

## 2025-02-04 PROCEDURE — 1036F TOBACCO NON-USER: CPT | Performed by: NURSE PRACTITIONER

## 2025-02-04 PROCEDURE — G8417 CALC BMI ABV UP PARAM F/U: HCPCS | Performed by: NURSE PRACTITIONER

## 2025-02-04 PROCEDURE — 99213 OFFICE O/P EST LOW 20 MIN: CPT | Performed by: NURSE PRACTITIONER

## 2025-02-04 PROCEDURE — G8427 DOCREV CUR MEDS BY ELIG CLIN: HCPCS | Performed by: NURSE PRACTITIONER

## 2025-02-04 PROCEDURE — 3078F DIAST BP <80 MM HG: CPT | Performed by: NURSE PRACTITIONER

## 2025-02-04 PROCEDURE — 3077F SYST BP >= 140 MM HG: CPT | Performed by: NURSE PRACTITIONER

## 2025-02-04 PROCEDURE — 3017F COLORECTAL CA SCREEN DOC REV: CPT | Performed by: NURSE PRACTITIONER

## 2025-02-04 ASSESSMENT — ENCOUNTER SYMPTOMS
RHINORRHEA: 0
CONSTIPATION: 0
COUGH: 1
ABDOMINAL PAIN: 0
EYES NEGATIVE: 1
SINUS PAIN: 0
SORE THROAT: 0
DIARRHEA: 0
SINUS PRESSURE: 0
EYE REDNESS: 0
GASTROINTESTINAL NEGATIVE: 1
NAUSEA: 0
SHORTNESS OF BREATH: 0
CHEST TIGHTNESS: 0
BACK PAIN: 0
VOMITING: 0
BLOOD IN STOOL: 0
EYE PAIN: 0

## 2025-02-04 NOTE — PROGRESS NOTES
Assessment and Plan     1. Bronchitis: Symptoms improving and mild. Pt is currently on Prednisone and abx treatment. Lungs are clear to auscultation. Return instructions given. Pt verbalized understanding.  2. Primary hypertension: Blood pressure stable at home, continue with Losartan.   3. Idiopathic membranous glomerulopathy: Follows up with nephrologist, continue with Prednisone and Bactrim.      BP Readings from Last 3 Encounters:   02/04/25 (!) 142/78   01/08/25 (!) 165/87   01/07/25 (!) 158/88     Benefits, risks, possible drug interactions, and side effects of all new medications were reviewed with the patient. Pt verbalized understanding.    An electronic signature was used to authenticate this note.  Emilia Cotter, APRN - CNP  2/4/2025      Follow-up and Dispositions    Return if symptoms worsen or fail to improve.          History of Present Illness   Chief Complaint     Zoltan Fontana Jr is a 64 y.o. male here for had concerns including Congestion.   Mr. Fontana presents today with reports of productive cough with yellow sputum, chest congestion and fatigue for about 10 days. Wife with similar symptoms. Tested negative for COVID-19 at home with negative results. Symptoms are improving. PMH includes HTN, dyslipidemia, migraines. Pt is also being treated with idiopathic membranous glomerulopathy, follows up with nephrologist. Currently on Bactrim x 3 per week and prednisone. Blood pressure at home around 130s/70s. Denies fever, chills, shortness of breath.       Review of Systems  Review of Systems   Constitutional:  Positive for fatigue. Negative for chills, fever and unexpected weight change.   HENT:  Positive for congestion. Negative for ear discharge, ear pain, rhinorrhea, sinus pressure, sinus pain, sore throat and tinnitus.    Eyes: Negative.  Negative for pain, redness and visual disturbance.   Respiratory:  Positive for cough. Negative for chest tightness and shortness of breath.

## 2025-02-13 ENCOUNTER — OFFICE VISIT (OUTPATIENT)
Facility: CLINIC | Age: 65
End: 2025-02-13

## 2025-02-13 VITALS
TEMPERATURE: 97.7 F | HEIGHT: 69 IN | BODY MASS INDEX: 26.72 KG/M2 | WEIGHT: 180.4 LBS | SYSTOLIC BLOOD PRESSURE: 129 MMHG | HEART RATE: 87 BPM | RESPIRATION RATE: 16 BRPM | OXYGEN SATURATION: 96 % | DIASTOLIC BLOOD PRESSURE: 80 MMHG

## 2025-02-13 DIAGNOSIS — Z00.00 MEDICARE ANNUAL WELLNESS VISIT, SUBSEQUENT: Primary | ICD-10-CM

## 2025-02-13 DIAGNOSIS — Z00.00 INITIAL MEDICARE ANNUAL WELLNESS VISIT: ICD-10-CM

## 2025-02-13 DIAGNOSIS — G51.0 BELL PALSY: ICD-10-CM

## 2025-02-13 DIAGNOSIS — K21.9 GASTRO-ESOPHAGEAL REFLUX DISEASE WITHOUT ESOPHAGITIS: ICD-10-CM

## 2025-02-13 DIAGNOSIS — J40 BRONCHITIS: ICD-10-CM

## 2025-02-13 DIAGNOSIS — G89.29 CHRONIC BILATERAL LOW BACK PAIN WITHOUT SCIATICA: ICD-10-CM

## 2025-02-13 DIAGNOSIS — M54.50 CHRONIC BILATERAL LOW BACK PAIN WITHOUT SCIATICA: ICD-10-CM

## 2025-02-13 DIAGNOSIS — N05.2 IDIOPATHIC MEMBRANOUS GLOMERULOPATHY: ICD-10-CM

## 2025-02-13 DIAGNOSIS — G44.89 OTHER HEADACHE SYNDROME: ICD-10-CM

## 2025-02-13 DIAGNOSIS — I10 PRIMARY HYPERTENSION: ICD-10-CM

## 2025-02-13 RX ORDER — DOXYCYCLINE HYCLATE 100 MG
100 TABLET ORAL 2 TIMES DAILY
Qty: 20 TABLET | Refills: 0 | Status: SHIPPED | OUTPATIENT
Start: 2025-02-13 | End: 2025-02-23

## 2025-02-13 ASSESSMENT — PATIENT HEALTH QUESTIONNAIRE - PHQ9
2. FEELING DOWN, DEPRESSED OR HOPELESS: NOT AT ALL
SUM OF ALL RESPONSES TO PHQ QUESTIONS 1-9: 0
1. LITTLE INTEREST OR PLEASURE IN DOING THINGS: NOT AT ALL
SUM OF ALL RESPONSES TO PHQ9 QUESTIONS 1 & 2: 0

## 2025-02-13 NOTE — PROGRESS NOTES
\"Have you been to the ER, urgent care clinic since your last visit?  Hospitalized since your last visit?\"    NO    “Have you seen or consulted any other health care providers outside our system since your last visit?”    NO            
Assessment and screening values have been reviewed and are found in Flowsheets. The following problems were reviewed today and where indicated follow up appointments were made and/or referrals ordered.    Positive Risk Factor Screenings with Interventions:             General HRA Questions:  Select all that apply: (!) New or Increased Fatigue    Interventions Fatigue:  Patient declined any further interventions or treatment      Inactivity:  On average, how many days per week do you engage in moderate to strenuous exercise (like a brisk walk)?: 0 days (!) Abnormal  On average, how many minutes do you engage in exercise at this level?: 0 min    Interventions:  Patient declined any further interventions or treatment      Dentist Screen:  Have you seen the dentist within the past year?: (!) No    Intervention:  Patient declines any further evaluation or treatment     Vision Screen:  Do you have difficulty driving, watching TV, or doing any of your daily activities because of your eyesight?: No  Have you had an eye exam within the past year?: (!) No    Interventions:   Patient declines any further evaluation or treatment    Safety:  Do you have non-slip mats or non-slip surfaces or shower bars or grab bars in your shower or bathtub?: (!) No    Interventions:  Patient declined any further interventions or treatment     Advanced Directives:  Do you have a Living Will?: (!) No    Intervention:  defers                     Objective   Vitals:    02/13/25 1100   BP: 129/80   Site: Left Upper Arm   Position: Sitting   Cuff Size: Medium Adult   Pulse: 87   Resp: 16   Temp: 97.7 °F (36.5 °C)   TempSrc: Oral   SpO2: 96%   Weight: 81.8 kg (180 lb 6.4 oz)   Height: 1.753 m (5' 9\")      Body mass index is 26.64 kg/m².                    Allergies   Allergen Reactions    Augmentin [Amoxicillin-Pot Clavulanate] Anaphylaxis    Bee Venom Palpitations and Shortness Of Breath    Contrast [Barium-Containing Compounds] Other (See Comments)

## 2025-02-26 DIAGNOSIS — G44.209 TENSION HEADACHE: ICD-10-CM

## 2025-02-26 DIAGNOSIS — G43.709 CHRONIC MIGRAINE WITHOUT AURA WITHOUT STATUS MIGRAINOSUS, NOT INTRACTABLE: ICD-10-CM

## 2025-02-26 DIAGNOSIS — R20.2 PARESTHESIA: Primary | ICD-10-CM

## 2025-02-26 RX ORDER — GABAPENTIN 100 MG/1
100 CAPSULE ORAL 3 TIMES DAILY
Qty: 270 CAPSULE | Refills: 2 | Status: SHIPPED | OUTPATIENT
Start: 2025-02-26 | End: 2025-11-23

## 2025-03-03 ENCOUNTER — HOSPITAL ENCOUNTER (OUTPATIENT)
Facility: HOSPITAL | Age: 65
Setting detail: INFUSION SERIES
Discharge: HOME | End: 2025-03-03
Payer: MEDICARE

## 2025-03-03 VITALS
DIASTOLIC BLOOD PRESSURE: 88 MMHG | RESPIRATION RATE: 16 BRPM | HEART RATE: 67 BPM | OXYGEN SATURATION: 97 % | TEMPERATURE: 97.7 F | SYSTOLIC BLOOD PRESSURE: 167 MMHG

## 2025-03-03 DIAGNOSIS — N04.20 MEMBRANOUS GLOMERULONEPHRITIS WITH NEPHROSIS: ICD-10-CM

## 2025-03-03 DIAGNOSIS — N04.21 PRIMARY MEMBRANOUS NEPHROPATHY WITH NEPHROTIC SYNDROME: ICD-10-CM

## 2025-03-03 DIAGNOSIS — N03.2 CHRONIC MEMBRANOUS NEPHRITIS: Primary | ICD-10-CM

## 2025-03-03 LAB
ALBUMIN SERPL-MCNC: 3.6 G/DL (ref 3.5–5)
ALBUMIN/GLOB SERPL: 1.2 (ref 1.1–2.2)
ALP SERPL-CCNC: 88 U/L (ref 45–117)
ALT SERPL-CCNC: 22 U/L (ref 12–78)
ANION GAP SERPL CALC-SCNC: 7 MMOL/L (ref 2–12)
APPEARANCE UR: CLEAR
AST SERPL-CCNC: 13 U/L (ref 15–37)
BACTERIA URNS QL MICRO: NEGATIVE /HPF
BASOPHILS # BLD: 0.01 K/UL (ref 0–0.1)
BASOPHILS NFR BLD: 0.1 % (ref 0–1)
BILIRUB SERPL-MCNC: 0.7 MG/DL (ref 0.2–1)
BILIRUB UR QL: NEGATIVE
BUN SERPL-MCNC: 19 MG/DL (ref 6–20)
BUN/CREAT SERPL: 21 (ref 12–20)
CALCIUM SERPL-MCNC: 9.2 MG/DL (ref 8.5–10.1)
CHLORIDE SERPL-SCNC: 112 MMOL/L (ref 97–108)
CO2 SERPL-SCNC: 24 MMOL/L (ref 21–32)
COLOR UR: ABNORMAL
CREAT SERPL-MCNC: 0.92 MG/DL (ref 0.7–1.3)
CREAT UR-MCNC: 155 MG/DL
DIFFERENTIAL METHOD BLD: ABNORMAL
EOSINOPHIL # BLD: 0.02 K/UL (ref 0–0.4)
EOSINOPHIL NFR BLD: 0.3 % (ref 0–7)
EPITH CASTS URNS QL MICRO: ABNORMAL /LPF
ERYTHROCYTE [DISTWIDTH] IN BLOOD BY AUTOMATED COUNT: 13.4 % (ref 11.5–14.5)
GLOBULIN SER CALC-MCNC: 2.9 G/DL (ref 2–4)
GLUCOSE SERPL-MCNC: 95 MG/DL (ref 65–100)
GLUCOSE UR STRIP.AUTO-MCNC: NEGATIVE MG/DL
HCT VFR BLD AUTO: 33.9 % (ref 36.6–50.3)
HGB BLD-MCNC: 12 G/DL (ref 12.1–17)
HGB UR QL STRIP: ABNORMAL
HYALINE CASTS URNS QL MICRO: ABNORMAL /LPF (ref 0–2)
IMM GRANULOCYTES # BLD AUTO: 0.06 K/UL (ref 0–0.04)
IMM GRANULOCYTES NFR BLD AUTO: 0.8 % (ref 0–0.5)
KETONES UR QL STRIP.AUTO: NEGATIVE MG/DL
LEUKOCYTE ESTERASE UR QL STRIP.AUTO: NEGATIVE
LYMPHOCYTES # BLD: 0.65 K/UL (ref 0.8–3.5)
LYMPHOCYTES NFR BLD: 8.4 % (ref 12–49)
MCH RBC QN AUTO: 34.6 PG (ref 26–34)
MCHC RBC AUTO-ENTMCNC: 35.4 G/DL (ref 30–36.5)
MCV RBC AUTO: 97.7 FL (ref 80–99)
MONOCYTES # BLD: 0.68 K/UL (ref 0–1)
MONOCYTES NFR BLD: 8.8 % (ref 5–13)
NEUTS SEG # BLD: 6.28 K/UL (ref 1.8–8)
NEUTS SEG NFR BLD: 81.6 % (ref 32–75)
NITRITE UR QL STRIP.AUTO: NEGATIVE
NRBC # BLD: 0 K/UL (ref 0–0.01)
NRBC BLD-RTO: 0 PER 100 WBC
PH UR STRIP: 5 (ref 5–8)
PLATELET # BLD AUTO: 252 K/UL (ref 150–400)
PMV BLD AUTO: 10.4 FL (ref 8.9–12.9)
POTASSIUM SERPL-SCNC: 2.9 MMOL/L (ref 3.5–5.1)
PROT SERPL-MCNC: 6.5 G/DL (ref 6.4–8.2)
PROT UR STRIP-MCNC: 100 MG/DL
PROT UR-MCNC: 63 MG/DL (ref 0–11.9)
PROT/CREAT UR-RTO: 0.4
RBC # BLD AUTO: 3.47 M/UL (ref 4.1–5.7)
RBC #/AREA URNS HPF: ABNORMAL /HPF (ref 0–5)
RBC MORPH BLD: ABNORMAL
SODIUM SERPL-SCNC: 143 MMOL/L (ref 136–145)
SP GR UR REFRACTOMETRY: 1.02 (ref 1–1.03)
UROBILINOGEN UR QL STRIP.AUTO: 0.2 EU/DL (ref 0.2–1)
WBC # BLD AUTO: 7.7 K/UL (ref 4.1–11.1)
WBC URNS QL MICRO: ABNORMAL /HPF (ref 0–4)

## 2025-03-03 PROCEDURE — 82570 ASSAY OF URINE CREATININE: CPT

## 2025-03-03 PROCEDURE — 2580000003 HC RX 258: Performed by: INTERNAL MEDICINE

## 2025-03-03 PROCEDURE — 96365 THER/PROPH/DIAG IV INF INIT: CPT

## 2025-03-03 PROCEDURE — 6360000002 HC RX W HCPCS: Performed by: INTERNAL MEDICINE

## 2025-03-03 PROCEDURE — 84156 ASSAY OF PROTEIN URINE: CPT

## 2025-03-03 PROCEDURE — 81001 URINALYSIS AUTO W/SCOPE: CPT

## 2025-03-03 PROCEDURE — 85025 COMPLETE CBC W/AUTO DIFF WBC: CPT

## 2025-03-03 PROCEDURE — 80053 COMPREHEN METABOLIC PANEL: CPT

## 2025-03-03 RX ORDER — SODIUM CHLORIDE 9 MG/ML
5-250 INJECTION, SOLUTION INTRAVENOUS PRN
Status: CANCELLED | OUTPATIENT
Start: 2025-03-04

## 2025-03-03 RX ORDER — SODIUM CHLORIDE 9 MG/ML
5-250 INJECTION, SOLUTION INTRAVENOUS PRN
Status: DISCONTINUED | OUTPATIENT
Start: 2025-03-03 | End: 2025-03-04 | Stop reason: HOSPADM

## 2025-03-03 RX ADMIN — SODIUM CHLORIDE 25 ML/HR: 9 INJECTION, SOLUTION INTRAVENOUS at 10:02

## 2025-03-03 RX ADMIN — SODIUM CHLORIDE 1000 MG: 9 INJECTION, SOLUTION INTRAVENOUS at 10:04

## 2025-03-03 ASSESSMENT — PAIN SCALES - GENERAL: PAINLEVEL_OUTOF10: 0

## 2025-03-03 NOTE — PROGRESS NOTES
[facility] Progress Note    Date: March 3, 2025    Name: Zoltan Fontana Jr    MRN: [id number]         : 1960    Mr. Addi Bowser Arrived ambulatory and in no distress for Solumedrol Infusion.  Assessment was completed, no acute issues at this time, no new complaints voiced.  24 G PIV established to right hand, + blood return. Labs drawn and sent for processing.    Mr. Addi Bowser's vitals were reviewed.  Vitals:    25 0930   BP: (!) 160/86   Pulse:    Resp:    Temp:    SpO2:        Lab results were obtained and reviewed.    Recent Results (from the past 12 hour(s))   CBC with Auto Differential    Collection Time: 25  9:29 AM   Result Value Ref Range    WBC 7.7 4.1 - 11.1 K/uL    RBC 3.47 (L) 4.10 - 5.70 M/uL    Hemoglobin 12.0 (L) 12.1 - 17.0 g/dL    Hematocrit 33.9 (L) 36.6 - 50.3 %    MCV 97.7 80.0 - 99.0 FL    MCH 34.6 (H) 26.0 - 34.0 PG    MCHC 35.4 30.0 - 36.5 g/dL    RDW 13.4 11.5 - 14.5 %    Platelets 252 150 - 400 K/uL    MPV 10.4 8.9 - 12.9 FL    Nucleated RBCs 0.0 0  WBC    nRBC 0.00 0.00 - 0.01 K/uL    Neutrophils % 81.6 (H) 32.0 - 75.0 %    Lymphocytes % 8.4 (L) 12.0 - 49.0 %    Monocytes % 8.8 5.0 - 13.0 %    Eosinophils % 0.3 0.0 - 7.0 %    Basophils % 0.1 0.0 - 1.0 %    Immature Granulocytes % 0.8 (H) 0.0 - 0.5 %    Neutrophils Absolute 6.28 1.80 - 8.00 K/UL    Lymphocytes Absolute 0.65 (L) 0.80 - 3.50 K/UL    Monocytes Absolute 0.68 0.00 - 1.00 K/UL    Eosinophils Absolute 0.02 0.00 - 0.40 K/UL    Basophils Absolute 0.01 0.00 - 0.10 K/UL    Immature Granulocytes Absolute 0.06 (H) 0.00 - 0.04 K/UL    Differential Type SMEAR SCANNED      RBC Comment MACROCYTOSIS  1+       Comprehensive Metabolic Panel    Collection Time: 25  9:29 AM   Result Value Ref Range    Sodium 143 136 - 145 mmol/L    Potassium 2.9 (L) 3.5 - 5.1 mmol/L    Chloride 112 (H) 97 - 108 mmol/L    CO2 24 21 - 32 mmol/L    Anion Gap 7 2 - 12 mmol/L    Glucose 95 65 - 100 mg/dL    BUN 19 6 - 20 MG/DL    Creatinine 0.92 0.70

## 2025-03-04 ENCOUNTER — HOSPITAL ENCOUNTER (OUTPATIENT)
Facility: HOSPITAL | Age: 65
Setting detail: INFUSION SERIES
Discharge: HOME | End: 2025-03-04
Payer: MEDICARE

## 2025-03-04 VITALS
HEART RATE: 60 BPM | OXYGEN SATURATION: 96 % | RESPIRATION RATE: 18 BRPM | DIASTOLIC BLOOD PRESSURE: 76 MMHG | BODY MASS INDEX: 27.21 KG/M2 | TEMPERATURE: 97.8 F | SYSTOLIC BLOOD PRESSURE: 155 MMHG | WEIGHT: 183.7 LBS | HEIGHT: 69 IN

## 2025-03-04 DIAGNOSIS — N04.20 MEMBRANOUS GLOMERULONEPHRITIS WITH NEPHROSIS: ICD-10-CM

## 2025-03-04 DIAGNOSIS — N03.2 CHRONIC MEMBRANOUS NEPHRITIS: Primary | ICD-10-CM

## 2025-03-04 DIAGNOSIS — N04.21 PRIMARY MEMBRANOUS NEPHROPATHY WITH NEPHROTIC SYNDROME: ICD-10-CM

## 2025-03-04 PROCEDURE — 96365 THER/PROPH/DIAG IV INF INIT: CPT

## 2025-03-04 PROCEDURE — 6360000002 HC RX W HCPCS: Performed by: INTERNAL MEDICINE

## 2025-03-04 PROCEDURE — 2580000003 HC RX 258: Performed by: INTERNAL MEDICINE

## 2025-03-04 RX ORDER — SODIUM CHLORIDE 9 MG/ML
5-250 INJECTION, SOLUTION INTRAVENOUS PRN
Status: DISCONTINUED | OUTPATIENT
Start: 2025-03-04 | End: 2025-03-05 | Stop reason: HOSPADM

## 2025-03-04 RX ORDER — SODIUM CHLORIDE 9 MG/ML
5-250 INJECTION, SOLUTION INTRAVENOUS PRN
Status: CANCELLED | OUTPATIENT
Start: 2025-03-05

## 2025-03-04 RX ADMIN — SODIUM CHLORIDE 25 ML/HR: 9 INJECTION, SOLUTION INTRAVENOUS at 13:23

## 2025-03-04 RX ADMIN — SODIUM CHLORIDE 1000 MG: 9 INJECTION, SOLUTION INTRAVENOUS at 13:33

## 2025-03-04 ASSESSMENT — PAIN SCALES - GENERAL: PAINLEVEL_OUTOF10: 0

## 2025-03-04 NOTE — PROGRESS NOTES
[facility]fusion [facility] Progress Note        Date: 2025    Name: Zoltan Fontana Jr    MRN: [id number]         : 1960    Mr. Addi Bowser Arrived ambulatory and in no distress for Solumedrol 2/3 Regimen.  Assessment was completed, no acute issues at this time, no new complaints voiced.  LFA 24G PIV placed with +blood return. Lab done yesterday.       Mr. Addi Bowser's vitals were reviewed.  Patient Vitals for the past 12 hrs:   Temp Pulse Resp BP SpO2   25 1500 -- 60 18 (!) 155/76 96 %   25 1307 97.8 °F (36.6 °C) 61 18 (!) 156/82 96 %           Medications received:  Medications Administered         0.9 % sodium chloride infusion Admin Date  2025 Action  New Bag Dose  25 mL/hr Rate  25 mL/hr Route  IntraVENous Documented By  Rosie Wells, JOYCE        methylPREDNISolone (Solu-MEDROL) 1000 mg in 100 mL NS IVPB (addEASE) Admin Date  2025 Action  New Bag Dose  1,000 mg Rate  100 mL/hr Route  IntraVENous Documented By  Rosie Wells, RN             Mr. Addi Bowser tolerated treatment well and was discharged from Outpatient Infusion Center in stable condition at 1505. PIV flushed and removed per protocol. He is to return on  2025 at 0930 for his next appointment.    Rosie Wells RN  2025    Future Appointments:  Future Appointments   Date Time Provider Department Center   3/5/2025  9:30 AM [facility] MED INF CHAIR 2 [facility]   3/17/2025  9:00 AM Terri Gama RD [facility]   2025  8:30 AM [place] APRN - NP [facility] BS AMB

## 2025-03-05 ENCOUNTER — HOSPITAL ENCOUNTER (OUTPATIENT)
Facility: HOSPITAL | Age: 65
Setting detail: INFUSION SERIES
Discharge: HOME | End: 2025-03-05
Payer: MEDICARE

## 2025-03-05 VITALS
DIASTOLIC BLOOD PRESSURE: 61 MMHG | OXYGEN SATURATION: 97 % | RESPIRATION RATE: 16 BRPM | TEMPERATURE: 97.5 F | HEART RATE: 66 BPM | SYSTOLIC BLOOD PRESSURE: 100 MMHG

## 2025-03-05 DIAGNOSIS — N03.2 CHRONIC MEMBRANOUS NEPHRITIS: Primary | ICD-10-CM

## 2025-03-05 DIAGNOSIS — N04.20 MEMBRANOUS GLOMERULONEPHRITIS WITH NEPHROSIS: ICD-10-CM

## 2025-03-05 DIAGNOSIS — N04.21 PRIMARY MEMBRANOUS NEPHROPATHY WITH NEPHROTIC SYNDROME: ICD-10-CM

## 2025-03-05 PROCEDURE — 6360000002 HC RX W HCPCS: Performed by: INTERNAL MEDICINE

## 2025-03-05 PROCEDURE — 2580000003 HC RX 258: Performed by: INTERNAL MEDICINE

## 2025-03-05 PROCEDURE — 96365 THER/PROPH/DIAG IV INF INIT: CPT

## 2025-03-05 RX ORDER — SODIUM CHLORIDE 9 MG/ML
5-250 INJECTION, SOLUTION INTRAVENOUS PRN
Status: DISCONTINUED | OUTPATIENT
Start: 2025-03-05 | End: 2025-03-06 | Stop reason: HOSPADM

## 2025-03-05 RX ORDER — SODIUM CHLORIDE 9 MG/ML
5-250 INJECTION, SOLUTION INTRAVENOUS PRN
Status: CANCELLED | OUTPATIENT
Start: 2025-03-17

## 2025-03-05 RX ADMIN — SODIUM CHLORIDE 25 ML/HR: 9 INJECTION, SOLUTION INTRAVENOUS at 09:46

## 2025-03-05 RX ADMIN — SODIUM CHLORIDE 1000 MG: 9 INJECTION, SOLUTION INTRAVENOUS at 09:52

## 2025-03-05 ASSESSMENT — PAIN SCALES - GENERAL: PAINLEVEL_OUTOF10: 0

## 2025-03-05 NOTE — PROGRESS NOTES
Outpatient Infusion Center   Visit Progress Note    [facility] 3/3 in stable condition. Assessment completed.     No new concerns voiced.    VS  Vitals:    03/05/25 0933 03/05/25 1113   BP: (!) 167/81 100/61   Pulse: 65 66   Resp: 18 16   Temp: 97.5 °F (36.4 °C) 97.5 °F (36.4 °C)   SpO2: 97%          LAC PIV with positive blood return.     Medications:  Medications Administered         0.9 % sodium chloride infusion Admin Date  03/05/2025 Action  New Bag Dose  25 mL/hr Rate  25 mL/hr Route  IntraVENous Documented By  Arleth Coburn RN        methylPREDNISolone (Solu-MEDROL) 1000 mg in 100 mL NS IVPB (addEASE) Admin Date  03/05/2025 Action  New Bag Dose  1,000 mg Rate  100 mL/hr Route  IntraVENous Documented By  Arleth Coburn, RN              Patient tolerated treatment well. Patient discharged from Outpatient Infusion Center in no distress. Patient aware of next appointment.    No labs were ordered/drawn this visit.

## 2025-05-09 RX ORDER — PROPRANOLOL HYDROCHLORIDE 10 MG/1
10 TABLET ORAL 2 TIMES DAILY
Qty: 90 TABLET | Refills: 3 | Status: SHIPPED | OUTPATIENT
Start: 2025-05-09

## 2025-05-29 DIAGNOSIS — G43.709 CHRONIC MIGRAINE WITHOUT AURA WITHOUT STATUS MIGRAINOSUS, NOT INTRACTABLE: ICD-10-CM

## 2025-05-29 DIAGNOSIS — R20.2 PARESTHESIA: ICD-10-CM

## 2025-05-29 DIAGNOSIS — G44.209 TENSION HEADACHE: ICD-10-CM

## 2025-05-29 RX ORDER — TOPIRAMATE 25 MG/1
TABLET, FILM COATED ORAL
Qty: 60 TABLET | Refills: 3 | Status: SHIPPED | OUTPATIENT
Start: 2025-05-29

## 2025-06-09 NOTE — PROGRESS NOTES
Zoltan Fontana Jr (:  1960) is a 64 y.o. male,Established patient, here for evaluation of the following chief complaint(s):  Joint Pain (Patient states he was bitten by a tick behind left knee about 4 weeks ago; lump, achy)          Diagnosis Orders   1. Arthralgia, unspecified joint  Lyme Disease Total Antibody With Reflex to Immunoassay      2. Idiopathic membranous glomerulopathy        3. Primary hypertension        4. Chronic bilateral low back pain without sciatica        5. Gastro-esophageal reflux disease without esophagitis        6. Other headache syndrome        Prednisone   Losartan, ? Propranolol   Tylenol, tizandidine  Protonix   Topamax   Assessment & Plan  1. Lyme disease.  - Completed a 21-day course of doxycycline approximately 2 weeks ago.  - Reports ongoing joint and muscle aches, especially in the morning.  - Blood work will be conducted to confirm the presence or absence of Lyme disease.  - If the results are negative, no further treatment will be necessary.    2. Glomerulopathy .  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  - In the process of tapering off prednisone, with the final dose scheduled for 2025. No longer on cytoxan or bactrim   - It may take several months for the body to adjust to the discontinuation of prednisone.  - Advised to allow time for adjustment and to postpone getting a tattoo until off all medications and no signs of recurrence or relapse.    3. Hypertension.  - Managing hypertension with losartan and propranolol.  - Blood pressure reading of 130/80.    4. Back pain.  - Taking tizanidine for back pain, supplemented with Tylenol as needed.  - No longer taking gabapentin.    5. Headaches.  - Currently on Topamax for headaches but plans to wean off this medication.  - Will monitor for recurrence of headaches and resume medication if necessary.    6. Gastroesophageal reflux disease.  - Continues to take pantoprazole for indigestion or reflux.  -

## 2025-06-11 ENCOUNTER — OFFICE VISIT (OUTPATIENT)
Facility: CLINIC | Age: 65
End: 2025-06-11
Payer: MEDICARE

## 2025-06-11 VITALS
HEART RATE: 59 BPM | WEIGHT: 189 LBS | SYSTOLIC BLOOD PRESSURE: 130 MMHG | DIASTOLIC BLOOD PRESSURE: 80 MMHG | TEMPERATURE: 97.9 F | OXYGEN SATURATION: 97 % | BODY MASS INDEX: 27.99 KG/M2 | RESPIRATION RATE: 16 BRPM | HEIGHT: 69 IN

## 2025-06-11 DIAGNOSIS — M54.50 CHRONIC BILATERAL LOW BACK PAIN WITHOUT SCIATICA: ICD-10-CM

## 2025-06-11 DIAGNOSIS — M25.50 ARTHRALGIA, UNSPECIFIED JOINT: Primary | ICD-10-CM

## 2025-06-11 DIAGNOSIS — I10 PRIMARY HYPERTENSION: ICD-10-CM

## 2025-06-11 DIAGNOSIS — N05.2 IDIOPATHIC MEMBRANOUS GLOMERULOPATHY: ICD-10-CM

## 2025-06-11 DIAGNOSIS — K21.9 GASTRO-ESOPHAGEAL REFLUX DISEASE WITHOUT ESOPHAGITIS: ICD-10-CM

## 2025-06-11 DIAGNOSIS — M25.50 ARTHRALGIA, UNSPECIFIED JOINT: ICD-10-CM

## 2025-06-11 DIAGNOSIS — G44.89 OTHER HEADACHE SYNDROME: ICD-10-CM

## 2025-06-11 DIAGNOSIS — G89.29 CHRONIC BILATERAL LOW BACK PAIN WITHOUT SCIATICA: ICD-10-CM

## 2025-06-11 PROCEDURE — G8428 CUR MEDS NOT DOCUMENT: HCPCS | Performed by: INTERNAL MEDICINE

## 2025-06-11 PROCEDURE — 3017F COLORECTAL CA SCREEN DOC REV: CPT | Performed by: INTERNAL MEDICINE

## 2025-06-11 PROCEDURE — 3075F SYST BP GE 130 - 139MM HG: CPT | Performed by: INTERNAL MEDICINE

## 2025-06-11 PROCEDURE — 3079F DIAST BP 80-89 MM HG: CPT | Performed by: INTERNAL MEDICINE

## 2025-06-11 PROCEDURE — G8417 CALC BMI ABV UP PARAM F/U: HCPCS | Performed by: INTERNAL MEDICINE

## 2025-06-11 PROCEDURE — 1036F TOBACCO NON-USER: CPT | Performed by: INTERNAL MEDICINE

## 2025-06-11 PROCEDURE — 99215 OFFICE O/P EST HI 40 MIN: CPT | Performed by: INTERNAL MEDICINE

## 2025-06-16 LAB — LYME ANTIBODY: NEGATIVE

## 2025-08-13 ENCOUNTER — OFFICE VISIT (OUTPATIENT)
Facility: CLINIC | Age: 65
End: 2025-08-13
Payer: MEDICARE

## 2025-08-13 VITALS
SYSTOLIC BLOOD PRESSURE: 108 MMHG | RESPIRATION RATE: 16 BRPM | BODY MASS INDEX: 27.11 KG/M2 | OXYGEN SATURATION: 97 % | WEIGHT: 183 LBS | HEIGHT: 69 IN | TEMPERATURE: 98.2 F | HEART RATE: 79 BPM | DIASTOLIC BLOOD PRESSURE: 70 MMHG

## 2025-08-13 DIAGNOSIS — J06.9 VIRAL UPPER RESPIRATORY TRACT INFECTION: Primary | ICD-10-CM

## 2025-08-13 DIAGNOSIS — I10 PRIMARY HYPERTENSION: ICD-10-CM

## 2025-08-13 PROCEDURE — 1036F TOBACCO NON-USER: CPT | Performed by: NURSE PRACTITIONER

## 2025-08-13 PROCEDURE — G8427 DOCREV CUR MEDS BY ELIG CLIN: HCPCS | Performed by: NURSE PRACTITIONER

## 2025-08-13 PROCEDURE — 3017F COLORECTAL CA SCREEN DOC REV: CPT | Performed by: NURSE PRACTITIONER

## 2025-08-13 PROCEDURE — 99213 OFFICE O/P EST LOW 20 MIN: CPT | Performed by: NURSE PRACTITIONER

## 2025-08-13 PROCEDURE — G8417 CALC BMI ABV UP PARAM F/U: HCPCS | Performed by: NURSE PRACTITIONER

## 2025-08-13 PROCEDURE — 3074F SYST BP LT 130 MM HG: CPT | Performed by: NURSE PRACTITIONER

## 2025-08-13 PROCEDURE — 3078F DIAST BP <80 MM HG: CPT | Performed by: NURSE PRACTITIONER

## 2025-08-13 RX ORDER — AMLODIPINE BESYLATE 2.5 MG/1
10 TABLET ORAL NIGHTLY
COMMUNITY
Start: 2025-07-29

## 2025-08-13 RX ORDER — FLUTICASONE PROPIONATE 50 MCG
2 SPRAY, SUSPENSION (ML) NASAL DAILY
Qty: 48 G | Refills: 1 | Status: SHIPPED | OUTPATIENT
Start: 2025-08-13

## 2025-08-13 ASSESSMENT — ENCOUNTER SYMPTOMS
BACK PAIN: 0
BLOOD IN STOOL: 0
SORE THROAT: 0
CONSTIPATION: 0
ABDOMINAL PAIN: 0
COUGH: 1
SINUS PAIN: 0
DIARRHEA: 0
GASTROINTESTINAL NEGATIVE: 1
SINUS PRESSURE: 0
SHORTNESS OF BREATH: 0
VOMITING: 0
RHINORRHEA: 1
EYES NEGATIVE: 1
NAUSEA: 0
EYE REDNESS: 0
EYE PAIN: 0
CHEST TIGHTNESS: 0